# Patient Record
Sex: FEMALE | Race: WHITE | NOT HISPANIC OR LATINO | Employment: PART TIME | URBAN - METROPOLITAN AREA
[De-identification: names, ages, dates, MRNs, and addresses within clinical notes are randomized per-mention and may not be internally consistent; named-entity substitution may affect disease eponyms.]

---

## 2017-05-20 ENCOUNTER — OFFICE VISIT (OUTPATIENT)
Dept: URGENT CARE | Facility: URGENT CARE | Age: 27
End: 2017-05-20
Payer: COMMERCIAL

## 2017-05-20 ENCOUNTER — RADIANT APPOINTMENT (OUTPATIENT)
Dept: GENERAL RADIOLOGY | Facility: CLINIC | Age: 27
End: 2017-05-20
Attending: PHYSICIAN ASSISTANT
Payer: COMMERCIAL

## 2017-05-20 VITALS
OXYGEN SATURATION: 100 % | DIASTOLIC BLOOD PRESSURE: 64 MMHG | SYSTOLIC BLOOD PRESSURE: 106 MMHG | RESPIRATION RATE: 12 BRPM | WEIGHT: 158 LBS | TEMPERATURE: 98.9 F | HEART RATE: 72 BPM

## 2017-05-20 DIAGNOSIS — M54.6 ACUTE LEFT-SIDED THORACIC BACK PAIN: Primary | ICD-10-CM

## 2017-05-20 DIAGNOSIS — M54.6 ACUTE LEFT-SIDED THORACIC BACK PAIN: ICD-10-CM

## 2017-05-20 LAB
ALBUMIN SERPL-MCNC: 3.3 G/DL (ref 3.4–5)
ALBUMIN UR-MCNC: NEGATIVE MG/DL
ALP SERPL-CCNC: 41 U/L (ref 40–150)
ALT SERPL W P-5'-P-CCNC: 21 U/L (ref 0–50)
ANION GAP SERPL CALCULATED.3IONS-SCNC: 7 MMOL/L (ref 3–14)
APPEARANCE UR: CLEAR
AST SERPL W P-5'-P-CCNC: 29 U/L (ref 0–45)
BILIRUB SERPL-MCNC: 0.9 MG/DL (ref 0.2–1.3)
BILIRUB UR QL STRIP: NEGATIVE
BUN SERPL-MCNC: 14 MG/DL (ref 7–30)
CALCIUM SERPL-MCNC: 9.3 MG/DL (ref 8.5–10.1)
CHLORIDE SERPL-SCNC: 104 MMOL/L (ref 94–109)
CO2 SERPL-SCNC: 27 MMOL/L (ref 20–32)
COLOR UR AUTO: YELLOW
CREAT SERPL-MCNC: 1.2 MG/DL (ref 0.52–1.04)
ERYTHROCYTE [DISTWIDTH] IN BLOOD BY AUTOMATED COUNT: 12.5 % (ref 10–15)
GFR SERPL CREATININE-BSD FRML MDRD: 55 ML/MIN/1.7M2
GLUCOSE SERPL-MCNC: 104 MG/DL (ref 70–99)
GLUCOSE UR STRIP-MCNC: NEGATIVE MG/DL
HCT VFR BLD AUTO: 41.6 % (ref 35–47)
HGB BLD-MCNC: 14.1 G/DL (ref 11.7–15.7)
HGB UR QL STRIP: NEGATIVE
KETONES UR STRIP-MCNC: NEGATIVE MG/DL
LEUKOCYTE ESTERASE UR QL STRIP: NEGATIVE
MCH RBC QN AUTO: 30.5 PG (ref 26.5–33)
MCHC RBC AUTO-ENTMCNC: 33.9 G/DL (ref 31.5–36.5)
MCV RBC AUTO: 90 FL (ref 78–100)
NITRATE UR QL: NEGATIVE
PH UR STRIP: 7 PH (ref 5–7)
PLATELET # BLD AUTO: 336 10E9/L (ref 150–450)
POTASSIUM SERPL-SCNC: 4.1 MMOL/L (ref 3.4–5.3)
PROT SERPL-MCNC: 7 G/DL (ref 6.8–8.8)
RBC # BLD AUTO: 4.62 10E12/L (ref 3.8–5.2)
RBC #/AREA URNS AUTO: NORMAL /HPF (ref 0–2)
SODIUM SERPL-SCNC: 138 MMOL/L (ref 133–144)
SP GR UR STRIP: 1.01 (ref 1–1.03)
URN SPEC COLLECT METH UR: NORMAL
UROBILINOGEN UR STRIP-ACNC: 0.2 EU/DL (ref 0.2–1)
WBC # BLD AUTO: 9.9 10E9/L (ref 4–11)
WBC #/AREA URNS AUTO: NORMAL /HPF (ref 0–2)

## 2017-05-20 PROCEDURE — 93000 ELECTROCARDIOGRAM COMPLETE: CPT | Performed by: PHYSICIAN ASSISTANT

## 2017-05-20 PROCEDURE — 81001 URINALYSIS AUTO W/SCOPE: CPT | Performed by: PHYSICIAN ASSISTANT

## 2017-05-20 PROCEDURE — 71020 XR CHEST 2 VW: CPT

## 2017-05-20 PROCEDURE — 85027 COMPLETE CBC AUTOMATED: CPT | Performed by: PHYSICIAN ASSISTANT

## 2017-05-20 PROCEDURE — 36415 COLL VENOUS BLD VENIPUNCTURE: CPT | Performed by: PHYSICIAN ASSISTANT

## 2017-05-20 PROCEDURE — 99203 OFFICE O/P NEW LOW 30 MIN: CPT | Performed by: PHYSICIAN ASSISTANT

## 2017-05-20 PROCEDURE — 80053 COMPREHEN METABOLIC PANEL: CPT | Performed by: PHYSICIAN ASSISTANT

## 2017-05-20 RX ORDER — DESOGESTREL AND ETHINYL ESTRADIOL 0.15-0.03
1 KIT ORAL DAILY
COMMUNITY
End: 2019-03-21

## 2017-05-20 NOTE — PROGRESS NOTES
SUBJECTIVE  HPI: Miracle Robertson is a 27 year old female who presents for evaluation of back pain and chest pain.  Back pain started yesterday afternoon, worsening now 6-7/10 (stabbing).   and .  Drove 4 hours (pain started then).  Sore to palpation.  Chest pain (5/10) tightness started last night.      History of heartburn with stress, too much coffee.  No indulgences  Prior to event.      Worse with deep breath.    No DVT history, risk factors.  OC for 6 years.      No Diabetes, Hypertension, hyperlipidemia.      Amiono acid supplement with workouts, 4x weekly for 2 years.       No past medical history on file.  Current Outpatient Prescriptions   Medication Sig Dispense Refill     budesonide (RINOCORT AQUA) 32 MCG/ACT spray Spray 1 spray into both nostrils daily       desogestrel-ethinyl estradiol (APRI) 0.15-30 MG-MCG per tablet Take 1 tablet by mouth daily       Social History   Substance Use Topics     Smoking status: Never Smoker     Smokeless tobacco: Not on file     Alcohol use Not on file       ROS:  ROS negative except as listed above      OBJECTIVE:  /64 (BP Location: Right arm, Patient Position: Chair, Cuff Size: Adult Regular)  Pulse 72  Temp 98.9  F (37.2  C) (Oral)  Resp 12  Wt 158 lb (71.7 kg)  SpO2 100%  Back examination:  Tenderness in left sided rhomboid area.  GENERAL APPEARANCE: healthy, alert and no distress  HENT: ear canals and TM's normal.  Nose and mouth without ulcers, erythema or lesions  RESP: lungs clear to auscultation - no rales, rhonchi or wheezes  CV: regular rates and rhythm, normal S1 S2, no murmur noted  ABDOMEN:  soft, nontender, no HSM or masses and bowel sounds normal  NEURO: Normal strength and tone with no weakness or sensory deficit noted, reflexes normal   BACK: No CVA tenderness    Results for orders placed or performed in visit on 05/20/17   Comprehensive metabolic panel (BMP + Alb, Alk Phos, ALT, AST, Total. Bili, TP)   Result  Value Ref Range    Sodium 138 133 - 144 mmol/L    Potassium 4.1 3.4 - 5.3 mmol/L    Chloride 104 94 - 109 mmol/L    Carbon Dioxide 27 20 - 32 mmol/L    Anion Gap 7 3 - 14 mmol/L    Glucose 104 (H) 70 - 99 mg/dL    Urea Nitrogen 14 7 - 30 mg/dL    Creatinine 1.20 (H) 0.52 - 1.04 mg/dL    GFR Estimate 55 (L) >60 mL/min/1.7m2    GFR Estimate If Black 66 >60 mL/min/1.7m2    Calcium 9.3 8.5 - 10.1 mg/dL    Bilirubin Total 0.9 0.2 - 1.3 mg/dL    Albumin 3.3 (L) 3.4 - 5.0 g/dL    Protein Total 7.0 6.8 - 8.8 g/dL    Alkaline Phosphatase 41 40 - 150 U/L    ALT 21 0 - 50 U/L    AST 29 0 - 45 U/L   CBC with platelets   Result Value Ref Range    WBC 9.9 4.0 - 11.0 10e9/L    RBC Count 4.62 3.8 - 5.2 10e12/L    Hemoglobin 14.1 11.7 - 15.7 g/dL    Hematocrit 41.6 35.0 - 47.0 %    MCV 90 78 - 100 fl    MCH 30.5 26.5 - 33.0 pg    MCHC 33.9 31.5 - 36.5 g/dL    RDW 12.5 10.0 - 15.0 %    Platelet Count 336 150 - 450 10e9/L   UA with Microscopic reflex to Culture   Result Value Ref Range    Color Urine Yellow     Appearance Urine Clear     Glucose Urine Negative NEG mg/dL    Bilirubin Urine Negative NEG    Ketones Urine Negative NEG mg/dL    Specific Gravity Urine 1.010 1.003 - 1.035    pH Urine 7.0 5.0 - 7.0 pH    Protein Albumin Urine Negative NEG mg/dL    Urobilinogen Urine 0.2 0.2 - 1.0 EU/dL    Nitrite Urine Negative NEG    Blood Urine Negative NEG    Leukocyte Esterase Urine Negative NEG    Source Midstream Urine     WBC Urine O - 2 0 - 2 /HPF    RBC Urine O - 2 0 - 2 /HPF     EKG: NSR  CXR: WNL    ASSESSMENT/IMPRESSION:  (M54.6) Acute left-sided thoracic back pain  (primary encounter diagnosis)  Comment:  Labs, exam, history do not show evidence of GI, pulm, cardiac etiology.  Likely MS injury.  Plan: EKG 12-lead complete w/read - Clinics, XR Chest      2 Views, Comprehensive metabolic panel (BMP +         Alb, Alk Phos, ALT, AST, Total. Bili, TP), CBC         with platelets, UA with Microscopic reflex to          Culture  RICE  Red flags and emergent follow up discussed, and understood by patient  Follow up with PCP if symptoms worsen or fail to improve

## 2017-05-20 NOTE — MR AVS SNAPSHOT
"              After Visit Summary   5/20/2017    Miracle Robertson    MRN: [id number]           Patient Information     Date Of Birth          1990        Visit Information        Provider Department      5/20/2017 10:40 AM Allen Vazquez PA-C [facility]        Today's Diagnoses     Acute left-sided thoracic back pain    -  1      Care Instructions      Neck/Back Pain: General    Both neck and back pain are usually caused by injury to the muscles or ligaments of the spine. Sometimes the disks that separate each bone of the spine may cause pain by pressing on a nearby nerve. Back and neck pain may appear after a sudden twisting/bending force (such as in a car accident), or sometimes after a simple awkward movement. In either case, muscle spasm is often present and adds to the pain.  Acute neck and back pain usually gets better in 1 to 2 weeks. Pain related to disk disease, arthritis in the spinal joints or spinal stenosis (narrowing of the spinal canal) can become chronic and last for months or years.  Back and neck pain are common problems. Most people feel better in 1 or 2 weeks, and most of the rest in 1 to 2 months. Most people can remain active.  Symptoms  People experience and describe pain differently.    Pain can be sharp, stabbing, shooting, aching, cramping, or burning    Movement, standing, bending, lifting, sitting, or walking may worsen the pain    Pain can be localized to one spot or area, or it can be more generalized    Pain can spread or radiate upwards, downwards, to the front, or go down your arms    Muscle spasm may occur.  Cause  Most of the time \"mechanical problems\" with the muscles or spine cause the pain. it is usually caused by an injury, whether known or not, to the muscles or ligaments. While illnesses can cause back pain, it is usually not caused by a serious illness. Pain is usually related to physical activity, whether sports, exercise, work, or normal " activity. Sometimes it can occur without an identifiable cause. This can happen simply by stretching or moving wrong, without noting pain at the time. Other causes include:    Overexertion, lifting, pushing, pulling incorrectly or too aggressively.    Sudden twisting, bending or stretching from an accident (car or fall), or accidental movement.    Poor posture    Poor conditioning, lack of regular exercise    Spinal disc disease or arthritis    Stress    Pregnancy, or illness like appendicitis, bladder or kidney infection, pelvic infections   Home care    For neck pain: Use a comfortable pillow that supports the head and keeps the spine in a neutral position. The position of the head should not be tilted forward or backward.    When in bed, try to find a position of comfort. A firm mattress is best. Try lying flat on your back with pillows under your knees. You can also try lying on your side with your knees bent up towards your chest and a pillow between your knees.    At first, do not try to stretch out the sore spots. If there is a strain, it is not like the good soreness you get after exercising without an injury. In this case, stretching may make it worse.    Avoid prolonged sitting, long car rides or travel. This puts more stress on the lower back than standing or walking.    During the first 24 to 72 hours after an injury, apply an ice pack to the painful area for 20 minutes and then remove it for 20 minutes over a period of 60 to 90 minutes or several times a day. As a safety precaution, do not use a heating pad at bedtime. Sleeping with a heating pad can lead to skin burns or tissue damage.    Ice and heat therapies can be alternated. Talk with your health care provider about the best treatment for your back or neck pain.    Therapeutic massage can help relax the back and neck muscles without stretching them.    Be aware of safe lifting methods and do not lift anything over 15 pounds until all the pain is  gone.  Medications  Talk to your health care provider before using medications, especially if you have other medical problems or are taking other medicines.    You may use acetaminophen (such as Tylenol) or ibuprofen (such as Advil or Motrin) to control pain, unless another pain medicine was prescribed. If you have chronic conditions like diabetes, liver or kidney disease, stomach ulcers,  gastrointestinal bleeding, or are taking blood thinner medications.    Be careful if you are given pain medicines, narcotics, or medication for muscle spasm. They can cause drowsiness, and can affect your coordination, reflexes, and judgment. Do not drive or operate heavy machinery.  Follow-up care  Follow up with your health care provider if your symptoms do not start to improve after one week. Physical therapy or further tests may be needed.  If X-rays were taken, they will be reviewed by a radiologist. You will be notified of any new findings that may affect your care.  Call 911  Seek emergency medical care if any of the following occur:    Trouble breathing    Confusion    Very drowsy or trouble awakening    Fainting or loss of consciousness    Rapid or very slow heart rate    Loss of bowel or bladder control  When to seek medical care  Get prompt medical attention if any of the following occur:    Pain becomes worse or spreads into your arms or legs    Weakness, numbness or pain in one or both arms or legs    Numbness in the groin area    Difficulty walking    Fever of 100.4 F (38 C) or higher, or as directed by your healthcare provider    8871-5999 [employer]. [address] [place] [address]. All rights reserved. This information is not intended as a substitute for professional medical care. Always follow your healthcare professional's instructions.        Discharge Instructions for Gastroesophageal Reflux Disease (GERD)  Gastroesophageal reflux disease (GERD) is a backflow of acid from the stomach  into the swallowing tube (esophagus).  Home care  These home care steps can help you manage GERD:    Maintain a healthy weight. Get help to lose any extra pounds.    Avoid lying down after meals.    Avoid eating late at night.    Elevate the head of your bed by 6 inches. You can do this by placing wooden blocks under the head of your bed.    Avoid wearing tight-fitting clothes.    Avoid foods that might irritate your stomach, such as the following:    Alcohol    Fat    Chocolate    Caffeine    Spearmint or peppermint    Talk to your doctor if you are taking any of the following medications. These medications can make GERD symptoms worse:    Calcium channel blockers    Theophylline    Anticholinergic medications such as oxybutynin and benzatropine    Begin an exercise program. Ask your doctor how to get started. You can benefit from simple activities, such as walking or gardening.    Break the smoking habit. Enroll in a stop-smoking program to improve your chances of success.    Limit alcohol intake to no more than 2 drinks a day.    Take your medications exactly as directed. Don t skip doses.    Avoid over-the-counter nonsteroidal anti-inflammatory drugs, such as aspirin and ibuprofen (Advil, Motrin).    If possible, avoid nitrates (heart medications such as nitroglycerin and Isordil).  Follow-up care  Make a follow-up appointment as directed by our staff.     When to seek medical care  Call your doctor immediately if you have any of the following:    Trouble swallowing    Pain when swallowing    Feeling of food caught in your chest or throat    Pain in the neck, chest, or back    Heartburn that causes you to vomit    Vomiting blood    Black or tarry stools (from digested blood)    More saliva (watering of the mouth) than usual    Weight loss of more than 3% to 5% of your total body weight in a month    Hoarseness or sore throat that won t go away    Choking, coughing, or wheezing     4649-1816 [employer] [address] [place] [address]. All rights reserved. This information is not intended as a substitute for professional medical care. Always follow your healthcare professional's instructions.              Follow-ups after your visit        Who to contact     If you have questions or need follow up information about today's clinic visit or your schedule please contact [facility] directly at [phone].  Normal or non-critical lab and imaging results will be communicated to you by MyChart, letter or phone within 4 business days after the clinic has received the results. If you do not hear from us within 7 days, please contact the clinic through [employer] or phone. If you have a critical or abnormal lab result, we will notify you by phone as soon as possible.  Submit refill requests through [employer] or call your pharmacy and they will forward the refill request to us. Please allow 3 business days for your refill to be completed.          Additional Information About Your Visit        [employer]â€™s Information     [employer] lets you send messages to your doctor, view your test results, renew your prescriptions, schedule appointments and more. To sign up, go to [email or web address] . Click on \"Log in\" on the left side of the screen, which will take you to the Welcome page. Then click on \"Sign up Now\" on the right side of the page.     You will be asked to enter the access code listed below, as well as some personal information. Please follow the directions to create your username and password.     Your access code is: [email or web address]  Expires: 2017 12:31 PM     Your access code will  in 90 days. If you need help or a new code, please call your [facility] clinic or [phone].        Care EveryWhere ID     This is your Care EveryWhere ID. This could be used by other organizations to access your [facility] medical records  [id number]        Your Vitals Were     Pulse Temperature " Respirations Pulse Oximetry          72 98.9  F (37.2  C) (Oral) 12 100%         Blood Pressure from Last 3 Encounters:   05/20/17 106/64    Weight from Last 3 Encounters:   05/20/17 158 lb (71.7 kg)              We Performed the Following     CBC with platelets     Comprehensive metabolic panel (BMP + Alb, Alk Phos, ALT, AST, Total. Bili, TP)     EKG 12-lead complete w/read - Clinics     UA with Microscopic reflex to Culture        Primary Care Provider    None Specified       No primary provider on file.        Thank you!     Thank you for choosing [facility]  for your care. Our goal is always to provide you with excellent care. Hearing back from our patients is one way we can continue to improve our services. Please take a few minutes to complete the written survey that you may receive in the mail after your visit with us. Thank you!             Your Updated Medication List - Protect others around you: Learn how to safely use, store and throw away your medicines at www.disposemymeds.org.          This list is accurate as of: 5/20/17 12:35 PM.  Always use your most recent med list.                   Brand Name Dispense Instructions for use    budesonide 32 MCG/ACT spray    RINOCORT AQUA     Spray 1 spray into both nostrils daily       desogestrel-ethinyl estradiol 0.15-30 MG-MCG per tablet    APRI     Take 1 tablet by mouth daily

## 2017-05-20 NOTE — NURSING NOTE
Chief Complaint   Patient presents with     Urgent Care     Pain     Pt has had some back pain since yesterday and it is mid/left near scapula.  Noticed some difficulty/pain with deep breaths last night and today as well.  She states that she lifts weights and is a  but wants to make sure it isnt something other than muscular becaue she is flying somewhere tomorrow.       Initial /64 (BP Location: Right arm, Patient Position: Chair, Cuff Size: Adult Regular)  Pulse 72  Temp 98.9  F (37.2  C) (Oral)  Resp 12  Wt 158 lb (71.7 kg)  SpO2 100% There is no height or weight on file to calculate BMI..  BP completed using cuff size: regular  Laisha Bernardo R.N.    Pt rates her pain as 5-6/10 when she takes a deep breath.

## 2017-05-20 NOTE — PATIENT INSTRUCTIONS
"  Neck/Back Pain: General    Both neck and back pain are usually caused by injury to the muscles or ligaments of the spine. Sometimes the disks that separate each bone of the spine may cause pain by pressing on a nearby nerve. Back and neck pain may appear after a sudden twisting/bending force (such as in a car accident), or sometimes after a simple awkward movement. In either case, muscle spasm is often present and adds to the pain.  Acute neck and back pain usually gets better in 1 to 2 weeks. Pain related to disk disease, arthritis in the spinal joints or spinal stenosis (narrowing of the spinal canal) can become chronic and last for months or years.  Back and neck pain are common problems. Most people feel better in 1 or 2 weeks, and most of the rest in 1 to 2 months. Most people can remain active.  Symptoms  People experience and describe pain differently.    Pain can be sharp, stabbing, shooting, aching, cramping, or burning    Movement, standing, bending, lifting, sitting, or walking may worsen the pain    Pain can be localized to one spot or area, or it can be more generalized    Pain can spread or radiate upwards, downwards, to the front, or go down your arms    Muscle spasm may occur.  Cause  Most of the time \"mechanical problems\" with the muscles or spine cause the pain. it is usually caused by an injury, whether known or not, to the muscles or ligaments. While illnesses can cause back pain, it is usually not caused by a serious illness. Pain is usually related to physical activity, whether sports, exercise, work, or normal activity. Sometimes it can occur without an identifiable cause. This can happen simply by stretching or moving wrong, without noting pain at the time. Other causes include:    Overexertion, lifting, pushing, pulling incorrectly or too aggressively.    Sudden twisting, bending or stretching from an accident (car or fall), or accidental movement.    Poor posture    Poor conditioning, " lack of regular exercise    Spinal disc disease or arthritis    Stress    Pregnancy, or illness like appendicitis, bladder or kidney infection, pelvic infections   Home care    For neck pain: Use a comfortable pillow that supports the head and keeps the spine in a neutral position. The position of the head should not be tilted forward or backward.    When in bed, try to find a position of comfort. A firm mattress is best. Try lying flat on your back with pillows under your knees. You can also try lying on your side with your knees bent up towards your chest and a pillow between your knees.    At first, do not try to stretch out the sore spots. If there is a strain, it is not like the good soreness you get after exercising without an injury. In this case, stretching may make it worse.    Avoid prolonged sitting, long car rides or travel. This puts more stress on the lower back than standing or walking.    During the first 24 to 72 hours after an injury, apply an ice pack to the painful area for 20 minutes and then remove it for 20 minutes over a period of 60 to 90 minutes or several times a day. As a safety precaution, do not use a heating pad at bedtime. Sleeping with a heating pad can lead to skin burns or tissue damage.    Ice and heat therapies can be alternated. Talk with your health care provider about the best treatment for your back or neck pain.    Therapeutic massage can help relax the back and neck muscles without stretching them.    Be aware of safe lifting methods and do not lift anything over 15 pounds until all the pain is gone.  Medications  Talk to your health care provider before using medications, especially if you have other medical problems or are taking other medicines.    You may use acetaminophen (such as Tylenol) or ibuprofen (such as Advil or Motrin) to control pain, unless another pain medicine was prescribed. If you have chronic conditions like diabetes, liver or kidney disease, stomach  ulcers,  gastrointestinal bleeding, or are taking blood thinner medications.    Be careful if you are given pain medicines, narcotics, or medication for muscle spasm. They can cause drowsiness, and can affect your coordination, reflexes, and judgment. Do not drive or operate heavy machinery.  Follow-up care  Follow up with your health care provider if your symptoms do not start to improve after one week. Physical therapy or further tests may be needed.  If X-rays were taken, they will be reviewed by a radiologist. You will be notified of any new findings that may affect your care.  Call 911  Seek emergency medical care if any of the following occur:    Trouble breathing    Confusion    Very drowsy or trouble awakening    Fainting or loss of consciousness    Rapid or very slow heart rate    Loss of bowel or bladder control  When to seek medical care  Get prompt medical attention if any of the following occur:    Pain becomes worse or spreads into your arms or legs    Weakness, numbness or pain in one or both arms or legs    Numbness in the groin area    Difficulty walking    Fever of 100.4 F (38 C) or higher, or as directed by your healthcare provider    6125-9866 [employer]. [address] [place], [place] [id number]. All rights reserved. This information is not intended as a substitute for professional medical care. Always follow your healthcare professional's instructions.        Discharge Instructions for Gastroesophageal Reflux Disease (GERD)  Gastroesophageal reflux disease (GERD) is a backflow of acid from the stomach into the swallowing tube (esophagus).  Home care  These home care steps can help you manage GERD:    Maintain a healthy weight. Get help to lose any extra pounds.    Avoid lying down after meals.    Avoid eating late at night.    Elevate the head of your bed by 6 inches. You can do this by placing wooden blocks under the head of your bed.    Avoid wearing tight-fitting  clothes.    Avoid foods that might irritate your stomach, such as the following:    Alcohol    Fat    Chocolate    Caffeine    Spearmint or peppermint    Talk to your doctor if you are taking any of the following medications. These medications can make GERD symptoms worse:    Calcium channel blockers    Theophylline    Anticholinergic medications such as oxybutynin and benzatropine    Begin an exercise program. Ask your doctor how to get started. You can benefit from simple activities, such as walking or gardening.    Break the smoking habit. Enroll in a stop-smoking program to improve your chances of success.    Limit alcohol intake to no more than 2 drinks a day.    Take your medications exactly as directed. Don t skip doses.    Avoid over-the-counter nonsteroidal anti-inflammatory drugs, such as aspirin and ibuprofen (Advil, Motrin).    If possible, avoid nitrates (heart medications such as nitroglycerin and Isordil).  Follow-up care  Make a follow-up appointment as directed by our staff.     When to seek medical care  Call your doctor immediately if you have any of the following:    Trouble swallowing    Pain when swallowing    Feeling of food caught in your chest or throat    Pain in the neck, chest, or back    Heartburn that causes you to vomit    Vomiting blood    Black or tarry stools (from digested blood)    More saliva (watering of the mouth) than usual    Weight loss of more than 3% to 5% of your total body weight in a month    Hoarseness or sore throat that won t go away    Choking, coughing, or wheezing     [id number] [employer]. [address] [place] [address], [place] [address]. All rights reserved. This information is not intended as a substitute for professional medical care. Always follow your healthcare professional's instructions.

## 2018-02-20 ENCOUNTER — TRANSFERRED RECORDS (OUTPATIENT)
Dept: HEALTH INFORMATION MANAGEMENT | Facility: CLINIC | Age: 28
End: 2018-02-20

## 2018-02-20 LAB
C TRACH DNA SPEC QL PROBE+SIG AMP: NEGATIVE
HPV ABSTRACT: NORMAL
N GONORRHOEA DNA SPEC QL PROBE+SIG AMP: NEGATIVE
PAP-ABSTRACT: ABNORMAL
SPECIMEN DESCRIP: NORMAL
SPECIMEN DESCRIPTION: NORMAL

## 2019-03-21 ENCOUNTER — OFFICE VISIT (OUTPATIENT)
Dept: INTERNAL MEDICINE | Facility: CLINIC | Age: 29
End: 2019-03-21
Payer: COMMERCIAL

## 2019-03-21 VITALS
RESPIRATION RATE: 14 BRPM | DIASTOLIC BLOOD PRESSURE: 70 MMHG | OXYGEN SATURATION: 97 % | SYSTOLIC BLOOD PRESSURE: 98 MMHG | HEART RATE: 83 BPM | WEIGHT: 145.5 LBS | HEIGHT: 65 IN | BODY MASS INDEX: 24.24 KG/M2

## 2019-03-21 DIAGNOSIS — Z12.4 SCREENING FOR MALIGNANT NEOPLASM OF CERVIX: ICD-10-CM

## 2019-03-21 DIAGNOSIS — Z23 NEED FOR PROPHYLACTIC VACCINATION WITH TETANUS-DIPHTHERIA (TD): ICD-10-CM

## 2019-03-21 DIAGNOSIS — L24.9 IRRITANT CONTACT DERMATITIS, UNSPECIFIED TRIGGER: ICD-10-CM

## 2019-03-21 DIAGNOSIS — Z23 NEED FOR VACCINATION: ICD-10-CM

## 2019-03-21 DIAGNOSIS — Z30.41 ENCOUNTER FOR SURVEILLANCE OF CONTRACEPTIVE PILLS: ICD-10-CM

## 2019-03-21 DIAGNOSIS — Z00.00 ROUTINE GENERAL MEDICAL EXAMINATION AT A HEALTH CARE FACILITY: Primary | ICD-10-CM

## 2019-03-21 PROCEDURE — 90715 TDAP VACCINE 7 YRS/> IM: CPT | Performed by: INTERNAL MEDICINE

## 2019-03-21 PROCEDURE — 99213 OFFICE O/P EST LOW 20 MIN: CPT | Mod: 25 | Performed by: INTERNAL MEDICINE

## 2019-03-21 PROCEDURE — 90471 IMMUNIZATION ADMIN: CPT | Performed by: INTERNAL MEDICINE

## 2019-03-21 PROCEDURE — 99395 PREV VISIT EST AGE 18-39: CPT | Mod: 25 | Performed by: INTERNAL MEDICINE

## 2019-03-21 RX ORDER — TRIAMCINOLONE ACETONIDE 1 MG/G
OINTMENT TOPICAL 2 TIMES DAILY
Qty: 30 G | Refills: 0 | Status: SHIPPED | OUTPATIENT
Start: 2019-03-21 | End: 2020-01-20

## 2019-03-21 RX ORDER — DESOGESTREL AND ETHINYL ESTRADIOL 0.15-0.03
1 KIT ORAL DAILY
Qty: 84 TABLET | Refills: 3 | Status: SHIPPED | OUTPATIENT
Start: 2019-03-21 | End: 2020-02-10

## 2019-03-21 SDOH — HEALTH STABILITY: MENTAL HEALTH: HOW MANY STANDARD DRINKS CONTAINING ALCOHOL DO YOU HAVE ON A TYPICAL DAY?: 1 OR 2

## 2019-03-21 SDOH — HEALTH STABILITY: MENTAL HEALTH: HOW OFTEN DO YOU HAVE A DRINK CONTAINING ALCOHOL?: 2-4 TIMES A MONTH

## 2019-03-21 SDOH — HEALTH STABILITY: MENTAL HEALTH: HOW OFTEN DO YOU HAVE 6 OR MORE DRINKS ON ONE OCCASION?: NEVER

## 2019-03-21 ASSESSMENT — MIFFLIN-ST. JEOR: SCORE: 1400.86

## 2019-03-21 NOTE — Clinical Note
Please abstract the following data from this visit with this patient into the appropriate field in Epic:Pap smear done on this date: 2/20/18 (approximately), by this group:  [facility] , results were ascus w/negative HPV.

## 2019-03-21 NOTE — Clinical Note
Please abstract the following data from this visit with this patient into the appropriate field in Epic:Pap smear done on this date: 2/20/18 (approximately), by this group: [facility], results were normal.

## 2019-03-21 NOTE — PROGRESS NOTES
SUBJECTIVE:   CC: Miracle Robertson is an 28 year old woman who presents for preventive health visit.     Physical   Annual:     Getting at least 3 servings of Calcium per day:  Yes    Bi-annual eye exam:  Yes    Dental care twice a year:  Yes    Sleep apnea or symptoms of sleep apnea:  None    Diet:  Regular (no restrictions)    Frequency of exercise:  4-5 days/week    Duration of exercise:  45-60 minutes    Taking medications regularly:  Yes    Medication side effects:  None    Additional concerns today:  Yes    PHQ-2 Total Score: 1          Rash      Duration:     Description  Location: both shins  Itching: moderate    Intensity:  mild    Accompanying signs and symptoms: None    History (similar episodes/previous evaluation): None    Precipitating or alleviating factors:  New exposures:  Dryer sheets  Recent travel: [place] jan/feb    Therapies tried and outcome: Benadryl/diphenhydramine -  effective Allegra/fexofenadine -  not effective      Today's PHQ-2 Score:   PHQ-2 ( 1999 [employer]) 3/21/2019   Q1: Little interest or pleasure in doing things 0   Q2: Feeling down, depressed or hopeless 1   PHQ-2 Score 1   Q1: Little interest or pleasure in doing things Not at all   Q2: Feeling down, depressed or hopeless Several days   PHQ-2 Score 1       Abuse: Current or Past(Physical, Sexual or Emotional)- No  Do you feel safe in your environment? Yes    Social History     Tobacco Use     Smoking status: Never Smoker     Smokeless tobacco: Never Used   Substance Use Topics     Alcohol use: Yes     Alcohol/week: 0.6 - 1.2 oz     Types: 1 - 2 Standard drinks or equivalent per week     Frequency: 2-4 times a month     Drinks per session: 1 or 2     Binge frequency: Never     Alcohol Use 3/21/2019   If you drink alcohol do you typically have greater than 3 drinks per day OR greater than 7 drinks per week? No   No flowsheet data found.    Reviewed orders with patient.  Reviewed health maintenance and updated orders  "accordingly - No       Mammogram not appropriate for this patient based on age.    Pertinent mammograms are reviewed under the imaging tab.    History of abnormal Pap smear: :     Last pap 2018- ASCUS.  HPV negative    Reviewed and updated as needed this visit by clinical staff  Tobacco  Allergies  Meds  Soc Hx        Reviewed and updated as needed this visit by Provider            Review of Systems  CONSTITUTIONAL: NEGATIVE for fever, chills, change in weight  INTEGUMENTARY/SKIN: as above  EYES: NEGATIVE for vision changes or irritation  ENT: NEGATIVE for ear, mouth and throat problems  RESP: NEGATIVE for significant cough or SOB  BREAST: NEGATIVE for masses, tenderness or discharge  CV: NEGATIVE for chest pain, palpitations or peripheral edema  GI: NEGATIVE for nausea, abdominal pain, heartburn, or change in bowel habits  : NEGATIVE for unusual urinary or vaginal symptoms. Periods are regular.  MUSCULOSKELETAL: NEGATIVE for significant arthralgias or myalgia  NEURO: NEGATIVE for weakness, dizziness or paresthesias  ENDOCRINE: NEGATIVE for temperature intolerance, skin/hair changes  HEME/ALLERGY/IMMUNE: NEGATIVE for bleeding problems  PSYCHIATRIC: NEGATIVE for changes in mood or affect     OBJECTIVE:   BP 98/70   Pulse 83   Resp 14   Ht 1.651 m (5' 5\")   Wt 66 kg (145 lb 8 oz)   LMP 03/19/2019 (Exact Date)   SpO2 97%   BMI 24.21 kg/m    Physical Exam  GENERAL: healthy, alert and no distress  EYES: Eyes grossly normal to inspection, PERRL and conjunctivae and sclerae normal  HENT: ear canals and TM's normal, nose and mouth without ulcers or lesions  NECK: no adenopathy, no asymmetry, masses, or scars and thyroid normal to palpation  RESP: lungs clear to auscultation - no rales, rhonchi or wheezes  CV: regular rate and rhythm, normal S1 S2, no S3 or S4, no murmur, click or rub, no peripheral edema and peripheral pulses strong  ABDOMEN: soft, nontender, no hepatosplenomegaly, no masses and bowel sounds " "normal  MS: no gross musculoskeletal defects noted, no edema  NEURO: Normal strength and tone, mentation intact and speech normal  PSYCH: mentation appears normal, affect normal/bright  LYMPH: no cervical, supraclavicular, axillary, or inguinal adenopathy  Skin: Erythematous rash on both shins with actually some central vesicular lesions.  none     ASSESSMENT/PLAN:   1. Routine general medical examination at a health care facility  Overall in excellent health    2. Screening for malignant neoplasm of cervix  Up-to-date.  Pap with reflex HPV done last year.  ASCUS result with a negative HPV.  Next Pap due 2021    3. Need for prophylactic vaccination with tetanus-diphtheria (Td)  Need for vaccination  - ADMIN 1st VACCINE      4. Irritant contact dermatitis, unspecified trigger  There is very consistent with a contact dermatitis  - triamcinolone (KENALOG) 0.1 % external ointment; Apply topically 2 times daily Until resolved or 14 d whichever is sooner  Dispense: 30 g; Refill: 0    5. Encounter for surveillance of contraceptive pills  Pap is up-to-date.  Available on care everywhere, with negative reflex HPV next is due every 3 years  Deferred STD screening. Done in 2018  - desogestrel-ethinyl estradiol (APRI) 0.15-30 MG-MCG tablet; Take 1 tablet by mouth daily  Dispense: 84 tablet; Refill: 3    6.   COUNSELING:  Reviewed preventive health counseling, as reflected in patient instructions    BP Readings from Last 1 Encounters:   03/21/19 98/70     Estimated body mass index is 24.21 kg/m  as calculated from the following:    Height as of this encounter: 1.651 m (5' 5\").    Weight as of this encounter: 66 kg (145 lb 8 oz).           reports that  has never smoked. she has never used smokeless tobacco.      Counseling Resources:  ATP IV Guidelines  Pooled Cohorts Equation Calculator  Breast Cancer Risk Calculator  FRAX Risk Assessment  ICSI Preventive Guidelines  Dietary Guidelines for Americans, 2010  USDA's MyPlate  ASA " Prophylaxis  Lung CA Screening    Paul Moore MD  [facility]

## 2019-12-30 ENCOUNTER — ANCILLARY PROCEDURE (OUTPATIENT)
Dept: GENERAL RADIOLOGY | Facility: CLINIC | Age: 29
End: 2019-12-30
Attending: PHYSICIAN ASSISTANT
Payer: COMMERCIAL

## 2019-12-30 ENCOUNTER — OFFICE VISIT (OUTPATIENT)
Dept: URGENT CARE | Facility: URGENT CARE | Age: 29
End: 2019-12-30
Payer: COMMERCIAL

## 2019-12-30 VITALS
TEMPERATURE: 98.2 F | HEART RATE: 86 BPM | OXYGEN SATURATION: 100 % | BODY MASS INDEX: 23.46 KG/M2 | WEIGHT: 141 LBS | SYSTOLIC BLOOD PRESSURE: 98 MMHG | DIASTOLIC BLOOD PRESSURE: 70 MMHG

## 2019-12-30 DIAGNOSIS — R06.02 SOB (SHORTNESS OF BREATH): ICD-10-CM

## 2019-12-30 DIAGNOSIS — F41.0 PANIC ATTACK: ICD-10-CM

## 2019-12-30 DIAGNOSIS — R07.0 THROAT PAIN: Primary | ICD-10-CM

## 2019-12-30 DIAGNOSIS — N39.0 URINARY TRACT INFECTION WITHOUT HEMATURIA, SITE UNSPECIFIED: ICD-10-CM

## 2019-12-30 DIAGNOSIS — R42 DIZZINESS: ICD-10-CM

## 2019-12-30 DIAGNOSIS — F41.1 GENERALIZED ANXIETY DISORDER: ICD-10-CM

## 2019-12-30 LAB
ALBUMIN SERPL-MCNC: 3.9 G/DL (ref 3.4–5)
ALBUMIN UR-MCNC: NEGATIVE MG/DL
ALP SERPL-CCNC: 64 U/L (ref 40–150)
ALT SERPL W P-5'-P-CCNC: 21 U/L (ref 0–50)
ANION GAP SERPL CALCULATED.3IONS-SCNC: 2 MMOL/L (ref 3–14)
APPEARANCE UR: CLEAR
AST SERPL W P-5'-P-CCNC: 18 U/L (ref 0–45)
BASOPHILS # BLD AUTO: 0 10E9/L (ref 0–0.2)
BASOPHILS NFR BLD AUTO: 0.4 %
BILIRUB SERPL-MCNC: 0.6 MG/DL (ref 0.2–1.3)
BILIRUB UR QL STRIP: NEGATIVE
BUN SERPL-MCNC: 14 MG/DL (ref 7–30)
CALCIUM SERPL-MCNC: 9.2 MG/DL (ref 8.5–10.1)
CHLORIDE SERPL-SCNC: 105 MMOL/L (ref 94–109)
CO2 SERPL-SCNC: 31 MMOL/L (ref 20–32)
COLOR UR AUTO: YELLOW
CREAT SERPL-MCNC: 1.03 MG/DL (ref 0.52–1.04)
D DIMER PPP FEU-MCNC: <0.3 UG/ML FEU (ref 0–0.5)
DEPRECATED S PYO AG THROAT QL EIA: NORMAL
DIFFERENTIAL METHOD BLD: ABNORMAL
EOSINOPHIL # BLD AUTO: 0.1 10E9/L (ref 0–0.7)
EOSINOPHIL NFR BLD AUTO: 0.7 %
ERYTHROCYTE [DISTWIDTH] IN BLOOD BY AUTOMATED COUNT: 12.6 % (ref 10–15)
ERYTHROCYTE [SEDIMENTATION RATE] IN BLOOD BY WESTERGREN METHOD: 4 MM/H (ref 0–20)
GFR SERPL CREATININE-BSD FRML MDRD: 74 ML/MIN/{1.73_M2}
GLUCOSE SERPL-MCNC: 93 MG/DL (ref 70–99)
GLUCOSE UR STRIP-MCNC: NEGATIVE MG/DL
HCT VFR BLD AUTO: 45 % (ref 35–47)
HGB BLD-MCNC: 15.4 G/DL (ref 11.7–15.7)
HGB UR QL STRIP: ABNORMAL
KETONES UR STRIP-MCNC: NEGATIVE MG/DL
LEUKOCYTE ESTERASE UR QL STRIP: ABNORMAL
LYMPHOCYTES # BLD AUTO: 2.5 10E9/L (ref 0.8–5.3)
LYMPHOCYTES NFR BLD AUTO: 30.1 %
MCH RBC QN AUTO: 29.3 PG (ref 26.5–33)
MCHC RBC AUTO-ENTMCNC: 34.2 G/DL (ref 31.5–36.5)
MCV RBC AUTO: 86 FL (ref 78–100)
MONOCYTES # BLD AUTO: 0.6 10E9/L (ref 0–1.3)
MONOCYTES NFR BLD AUTO: 6.7 %
NEUTROPHILS # BLD AUTO: 5.1 10E9/L (ref 1.6–8.3)
NEUTROPHILS NFR BLD AUTO: 62.1 %
NITRATE UR QL: NEGATIVE
NON-SQ EPI CELLS #/AREA URNS LPF: ABNORMAL /LPF
PH UR STRIP: 7 PH (ref 5–7)
PLATELET # BLD AUTO: 404 10E9/L (ref 150–450)
POTASSIUM SERPL-SCNC: 4.5 MMOL/L (ref 3.4–5.3)
PROT SERPL-MCNC: 7.8 G/DL (ref 6.8–8.8)
RBC # BLD AUTO: 5.26 10E12/L (ref 3.8–5.2)
RBC #/AREA URNS AUTO: ABNORMAL /HPF
SODIUM SERPL-SCNC: 138 MMOL/L (ref 133–144)
SOURCE: ABNORMAL
SP GR UR STRIP: <=1.005 (ref 1–1.03)
SPECIMEN SOURCE: NORMAL
TSH SERPL DL<=0.005 MIU/L-ACNC: 0.7 MU/L (ref 0.4–4)
UROBILINOGEN UR STRIP-ACNC: 0.2 EU/DL (ref 0.2–1)
WBC # BLD AUTO: 8.2 10E9/L (ref 4–11)
WBC #/AREA URNS AUTO: ABNORMAL /HPF

## 2019-12-30 PROCEDURE — 80050 GENERAL HEALTH PANEL: CPT | Performed by: PHYSICIAN ASSISTANT

## 2019-12-30 PROCEDURE — 81001 URINALYSIS AUTO W/SCOPE: CPT | Performed by: PHYSICIAN ASSISTANT

## 2019-12-30 PROCEDURE — 85652 RBC SED RATE AUTOMATED: CPT | Performed by: PHYSICIAN ASSISTANT

## 2019-12-30 PROCEDURE — 87086 URINE CULTURE/COLONY COUNT: CPT | Performed by: PHYSICIAN ASSISTANT

## 2019-12-30 PROCEDURE — 36415 COLL VENOUS BLD VENIPUNCTURE: CPT | Performed by: PHYSICIAN ASSISTANT

## 2019-12-30 PROCEDURE — 87088 URINE BACTERIA CULTURE: CPT | Performed by: PHYSICIAN ASSISTANT

## 2019-12-30 PROCEDURE — 99215 OFFICE O/P EST HI 40 MIN: CPT | Performed by: PHYSICIAN ASSISTANT

## 2019-12-30 PROCEDURE — 71046 X-RAY EXAM CHEST 2 VIEWS: CPT

## 2019-12-30 PROCEDURE — 85379 FIBRIN DEGRADATION QUANT: CPT | Performed by: PHYSICIAN ASSISTANT

## 2019-12-30 PROCEDURE — 87081 CULTURE SCREEN ONLY: CPT | Mod: 59 | Performed by: PHYSICIAN ASSISTANT

## 2019-12-30 PROCEDURE — 87880 STREP A ASSAY W/OPTIC: CPT | Performed by: PHYSICIAN ASSISTANT

## 2019-12-30 RX ORDER — HYDROXYZINE HYDROCHLORIDE 25 MG/1
25-50 TABLET, FILM COATED ORAL 3 TIMES DAILY PRN
Qty: 30 TABLET | Refills: 0 | Status: SHIPPED | OUTPATIENT
Start: 2019-12-30 | End: 2020-01-20

## 2019-12-30 RX ORDER — NITROFURANTOIN 25; 75 MG/1; MG/1
100 CAPSULE ORAL 2 TIMES DAILY
Qty: 14 CAPSULE | Refills: 0 | Status: SHIPPED | OUTPATIENT
Start: 2019-12-30 | End: 2020-01-20

## 2019-12-30 RX ORDER — SERTRALINE HYDROCHLORIDE 25 MG/1
TABLET, FILM COATED ORAL
Qty: 30 TABLET | Refills: 0 | Status: SHIPPED | OUTPATIENT
Start: 2019-12-30 | End: 2020-01-20

## 2019-12-30 NOTE — PROGRESS NOTES
SUBJECTIVE:   Miracle Robertson is a 29 year old female presenting with a chief complaint of having panic attacks, anxiety and at times feels like she has pain or SOB.  Onset of symptoms was 1 month(s) ago.  Course of illness is waxing and waning.    Severity mild to moderate  Current and Associated symptoms: SOB, anxiety  Treatment measures tried include none.  Predisposing factors include has hx of anxiety and has been working out very hard.    Past Medical History:   Diagnosis Date     Allergic rhinitis      Recurrent otitis media     as child      ALLERGIES   No Known Allergies    Family History   Problem Relation Age of Onset     Breast Cancer Maternal Grandmother        Social History     Tobacco Use     Smoking status: Never Smoker     Smokeless tobacco: Never Used   Substance Use Topics     Alcohol use: Yes     Alcohol/week: 1.0 - 2.0 standard drinks     Types: 1 - 2 Standard drinks or equivalent per week     Frequency: 2-4 times a month     Drinks per session: 1 or 2     Binge frequency: Never       ROS:  CONSTITUTIONAL:POSITIVE  for feeling anxiety  INTEGUMENTARY/SKIN: NEGATIVE for worrisome rashes, moles or lesions  EYES: NEGATIVE for vision changes or irritation  ENT/MOUTH: NEGATIVE for ear, mouth and throat problems  RESP:POSITIVE for SOB at times  CV: NEGATIVE for chest pain, palpitations or peripheral edema  GI: NEGATIVE for nausea, abdominal pain, heartburn, or change in bowel habits  : negative for and dysuria  MUSCULOSKELETAL: NEGATIVE for significant arthralgias or myalgia  NEURO: POSITIVE for intermittent dizziness  PSYCH: POSITIVE for generalized anxiety and feeling anxious    OBJECTIVE  :BP 98/70   Pulse 86   Temp 98.2  F (36.8  C) (Tympanic)   Wt 64 kg (141 lb)   SpO2 100%   BMI 23.46 kg/m    GENERAL APPEARANCE: healthy, alert and no distress  EYES: EOMI,  PERRL, conjunctiva clear  HENT: ear canals and TM's normal.  Nose and mouth without ulcers, erythema or lesions  NECK: supple,  nontender, no lymphadenopathy  RESP: lungs clear to auscultation - no rales, rhonchi or wheezes  CV: regular rates and rhythm, normal S1 S2, no murmur noted  ABDOMEN:  soft, nontender, no HSM or masses and bowel sounds normal  Extremities: no peripheral edema or tenderness, peripheral pulses normal  MS: extremities normal- no gross deformities noted, no erythema, FROM noted in all extremities  NEURO: Normal strength and tone, sensory exam grossly normal,  normal speech and mentation  SKIN: no suspicious lesions or rashes    Results for orders placed or performed in visit on 12/30/19   CBC with platelets differential     Status: Abnormal   Result Value Ref Range    WBC 8.2 4.0 - 11.0 10e9/L    RBC Count 5.26 (H) 3.8 - 5.2 10e12/L    Hemoglobin 15.4 11.7 - 15.7 g/dL    Hematocrit 45.0 35.0 - 47.0 %    MCV 86 78 - 100 fl    MCH 29.3 26.5 - 33.0 pg    MCHC 34.2 31.5 - 36.5 g/dL    RDW 12.6 10.0 - 15.0 %    Platelet Count 404 150 - 450 10e9/L    % Neutrophils 62.1 %    % Lymphocytes 30.1 %    % Monocytes 6.7 %    % Eosinophils 0.7 %    % Basophils 0.4 %    Absolute Neutrophil 5.1 1.6 - 8.3 10e9/L    Absolute Lymphocytes 2.5 0.8 - 5.3 10e9/L    Absolute Monocytes 0.6 0.0 - 1.3 10e9/L    Absolute Eosinophils 0.1 0.0 - 0.7 10e9/L    Absolute Basophils 0.0 0.0 - 0.2 10e9/L    Diff Method Automated Method    Comprehensive metabolic panel     Status: Abnormal   Result Value Ref Range    Sodium 138 133 - 144 mmol/L    Potassium 4.5 3.4 - 5.3 mmol/L    Chloride 105 94 - 109 mmol/L    Carbon Dioxide 31 20 - 32 mmol/L    Anion Gap 2 (L) 3 - 14 mmol/L    Glucose 93 70 - 99 mg/dL    Urea Nitrogen 14 7 - 30 mg/dL    Creatinine 1.03 0.52 - 1.04 mg/dL    GFR Estimate 74 >60 mL/min/[1.73_m2]    GFR Estimate If Black 86 >60 mL/min/[1.73_m2]    Calcium 9.2 8.5 - 10.1 mg/dL    Bilirubin Total 0.6 0.2 - 1.3 mg/dL    Albumin 3.9 3.4 - 5.0 g/dL    Protein Total 7.8 6.8 - 8.8 g/dL    Alkaline Phosphatase 64 40 - 150 U/L    ALT 21 0 - 50 U/L     AST 18 0 - 45 U/L   TSH with free T4 reflex     Status: None   Result Value Ref Range    TSH 0.70 0.40 - 4.00 mU/L   UA with Microscopic reflex to Culture     Status: Abnormal   Result Value Ref Range    Color Urine Yellow     Appearance Urine Clear     Glucose Urine Negative NEG^Negative mg/dL    Bilirubin Urine Negative NEG^Negative    Ketones Urine Negative NEG^Negative mg/dL    Specific Gravity Urine <=1.005 1.003 - 1.035    pH Urine 7.0 5.0 - 7.0 pH    Protein Albumin Urine Negative NEG^Negative mg/dL    Urobilinogen Urine 0.2 0.2 - 1.0 EU/dL    Nitrite Urine Negative NEG^Negative    Blood Urine Trace (A) NEG^Negative    Leukocyte Esterase Urine Small (A) NEG^Negative    Source Midstream Urine     WBC Urine 5-10 (A) OTO5^0 - 5 /HPF    RBC Urine 2-5 (A) OTO2^O - 2 /HPF    Squamous Epithelial /LPF Urine Many (A) FEW^Few /LPF   D dimer quantitative     Status: None   Result Value Ref Range    D Dimer <0.3 0.0 - 0.50 ug/ml FEU   Erythrocyte sedimentation rate auto     Status: None   Result Value Ref Range    Sed Rate 4 0 - 20 mm/h   Strep, Rapid Screen     Status: None   Result Value Ref Range    Specimen Description Throat     Rapid Strep A Screen       NEGATIVE: No Group A streptococcal antigen detected by immunoassay, await culture report.     Chest ray Negative for acute findings, read by Reji ECKERT at time of visit.    ASSESSMENT/PLAN      ICD-10-CM    1. Throat pain R07.0 Strep, Rapid Screen     Beta strep group A culture   2. Panic attack F41.0 CBC with platelets differential     TSH with free T4 reflex     sertraline (ZOLOFT) 25 MG tablet     hydrOXYzine (ATARAX) 25 MG tablet   3. Dizziness R42 CBC with platelets differential     Comprehensive metabolic panel     TSH with free T4 reflex     UA with Microscopic reflex to Culture     sertraline (ZOLOFT) 25 MG tablet   4. SOB (shortness of breath) R06.02 D dimer quantitative     XR Chest 2 Views     Erythrocyte sedimentation rate auto   5. Urinary  tract infection without hematuria, site unspecified N39.0 Urine Culture Aerobic Bacterial     nitroFURantoin macrocrystal-monohydrate (MACROBID) 100 MG capsule   6. Generalized anxiety disorder F41.1 sertraline (ZOLOFT) 25 MG tablet       Orders Placed This Encounter     XR Chest 2 Views     CBC with platelets differential     Comprehensive metabolic panel     TSH with free T4 reflex     UA with Microscopic reflex to Culture     D dimer quantitative     Erythrocyte sedimentation rate auto     nitroFURantoin macrocrystal-monohydrate (MACROBID) 100 MG capsule     sertraline (ZOLOFT) 25 MG tablet     hydrOXYzine (ATARAX) 25 MG tablet     D-dimer to rule out PE  CBC to rule out anemia  CMP to rule out diabetes  TSH to rule out thyroid disorder  Chest xray negative for pneumonia  Urine culture pending  Will treat with macrobid for UTI  Will treat with zoloft and atarax  Follow up with PCP in 30 days for re-evaluation

## 2019-12-31 LAB
BACTERIA SPEC CULT: NORMAL
SPECIMEN SOURCE: NORMAL

## 2020-01-01 LAB
BACTERIA SPEC CULT: ABNORMAL
BACTERIA SPEC CULT: ABNORMAL
SPECIMEN SOURCE: ABNORMAL

## 2020-01-02 ENCOUNTER — TELEPHONE (OUTPATIENT)
Dept: URGENT CARE | Facility: URGENT CARE | Age: 30
End: 2020-01-02

## 2020-01-02 DIAGNOSIS — N30.00 ACUTE CYSTITIS WITHOUT HEMATURIA: Primary | ICD-10-CM

## 2020-01-02 RX ORDER — CEFPROZIL 500 MG/1
500 TABLET, FILM COATED ORAL 2 TIMES DAILY
Qty: 14 TABLET | Refills: 0 | Status: SHIPPED | OUTPATIENT
Start: 2020-01-02 | End: 2020-01-20

## 2020-01-02 NOTE — TELEPHONE ENCOUNTER
Inform patient that her urine grew out bacteria that is resistant to macrobid.  Stop macrobid and start Cefzil.  Its been called into her [facility] pharmacy.    Thank you  TOMEKA CrandallC

## 2020-01-08 ENCOUNTER — OFFICE VISIT (OUTPATIENT)
Dept: URGENT CARE | Facility: URGENT CARE | Age: 30
End: 2020-01-08
Payer: COMMERCIAL

## 2020-01-08 VITALS
BODY MASS INDEX: 23.46 KG/M2 | TEMPERATURE: 98.4 F | DIASTOLIC BLOOD PRESSURE: 68 MMHG | WEIGHT: 141 LBS | SYSTOLIC BLOOD PRESSURE: 118 MMHG | HEART RATE: 71 BPM | RESPIRATION RATE: 18 BRPM | OXYGEN SATURATION: 97 %

## 2020-01-08 DIAGNOSIS — J04.0 LARYNGITIS: Primary | ICD-10-CM

## 2020-01-08 PROCEDURE — 99213 OFFICE O/P EST LOW 20 MIN: CPT | Performed by: PHYSICIAN ASSISTANT

## 2020-01-08 RX ORDER — PREDNISONE 20 MG/1
20 TABLET ORAL DAILY
Qty: 10 TABLET | Refills: 0 | Status: SHIPPED | OUTPATIENT
Start: 2020-01-08 | End: 2020-01-20

## 2020-01-09 NOTE — PATIENT INSTRUCTIONS
Patient Education     Laryngitis    Laryngitis is a swelling of the tissues around the vocal cords. Symptoms include a hoarse (scratchy) voice. Or your voice may be gone for a few days or longer. This may be caused by a viral illness, such as a head or chest cold. It may also be due to overuse and strain of your voice. Smoking, drinking alcohol, acid reflux, allergies, or inhaling harsh chemicals may also lead to symptoms. This condition will usually go away in 1-2 weeks.  Home care    Rest your voice until it recovers. Talk as little as possible. If your symptoms are severe, rest at home for a day or so.    Moist air may help your symptoms. Try breathing cool steam from a humidifier or vaporizer. Or breathe air from a steamy shower.    Drink plenty of fluids to stay well hydrated.    Do not smoke  Follow-up care  Follow up with your healthcare provider or this facility if you are not better after 1 week. If your hoarse voice lasts more than 2 weeks, you may need to see an otolaryngologist. This is a doctor who treats diseases and disorders of the ear, nose, and throat (ENT). Seeing this doctor is especially important if you have a history of alcohol or tobacco use.  When to seek medical advice  Contact your healthcare provider if you have any of the following:    Symptoms that get worse    Severe pain with swallowing    Trouble opening your mouth    Neck swelling, neck pain, or trouble moving your neck    Noisy breathing or trouble breathing    Fever of 100.4 F (38. C) or higher, or as directed by your healthcare provider    Drooling    Symptoms do not go away in 2 weeks  Date Last Reviewed: 11/1/2017 2000-2019 [employer]. [address] [place] [address], [place] [address]. All rights reserved. This information is not intended as a substitute for professional medical care. Always follow your healthcare professional's instructions.

## 2020-01-09 NOTE — PROGRESS NOTES
SUBJECTIVE:   Miracle Robertson is a 29 year old female presenting with a chief complaint of hoarse voice and irritation when swallowing following cold-like symptoms 1 weeks ago.  Onset of symptoms was 3 day(s) ago.  Course of illness is same.    Severity moderate  Treatment measures tried include None tried.  Predisposing factors include None.     Patient was negative for strep at last visit.     Past Medical History:   Diagnosis Date     Allergic rhinitis      Recurrent otitis media     as child      Current Outpatient Medications   Medication Sig Dispense Refill     cefPROZIL (CEFZIL) 500 MG tablet Take 1 tablet (500 mg) by mouth 2 times daily 14 tablet 0     desogestrel-ethinyl estradiol (APRI) 0.15-30 MG-MCG tablet Take 1 tablet by mouth daily 84 tablet 3     sertraline (ZOLOFT) 25 MG tablet 1/2 tab po every day for 1-2 wks then 1 tab per day 30 tablet 0     budesonide (RINOCORT AQUA) 32 MCG/ACT spray Spray 1 spray into both nostrils daily       hydrOXYzine (ATARAX) 25 MG tablet Take 1-2 tablets (25-50 mg) by mouth 3 times daily as needed for anxiety (insomnia) (Patient not taking: Reported on 1/8/2020) 30 tablet 0     nitroFURantoin macrocrystal-monohydrate (MACROBID) 100 MG capsule Take 1 capsule (100 mg) by mouth 2 times daily (Patient not taking: Reported on 1/8/2020) 14 capsule 0     triamcinolone (KENALOG) 0.1 % external ointment Apply topically 2 times daily Until resolved or 14 d whichever is sooner (Patient not taking: Reported on 1/8/2020) 30 g 0     Social History     Tobacco Use     Smoking status: Never Smoker     Smokeless tobacco: Never Used   Substance Use Topics     Alcohol use: Yes     Alcohol/week: 1.0 - 2.0 standard drinks     Types: 1 - 2 Standard drinks or equivalent per week     Frequency: 2-4 times a month     Drinks per session: 1 or 2     Binge frequency: Never       ROS:  CONSTITUTIONAL:NEGATIVE for fever, chills, change in weight  INTEGUMENTARY/SKIN: NEGATIVE for worrisome rashes, moles  or lesions  EYES: NEGATIVE for vision changes or irritation  ENT/MOUTH: See HPI  RESP:NEGATIVE for significant cough or SOB  CV: NEGATIVE for chest pain, palpitations or peripheral edema  GI: NEGATIVE for nausea, abdominal pain, heartburn, or change in bowel habits  HEME/ALLERGY/IMMUNE: NEGATIVE for bleeding problems  PSYCHIATRIC: NEGATIVE for changes in mood or affect    OBJECTIVE:  /68   Pulse 71   Temp 98.4  F (36.9  C) (Tympanic)   Resp 18   Wt 64 kg (141 lb)   SpO2 97%   BMI 23.46 kg/m    GENERAL APPEARANCE: healthy, alert and no distress  EYES: EOMI,  PERRL, conjunctiva clear  HENT: ear canals and TM's normal.  Nose and mouth without ulcers, erythema or lesions  NECK: supple, nontender, no lymphadenopathy  RESP: lungs clear to auscultation - no rales, rhonchi or wheezes  CV: regular rates and rhythm, normal S1 S2, no murmur noted  ABDOMEN:  soft, nontender, no HSM or masses and bowel sounds normal  NEURO: Normal strength and tone, sensory exam grossly normal,  normal speech and mentation  SKIN: no suspicious lesions or rashes    ASSESSMENT/PLAN:    (J04.0) Laryngitis  (primary encounter diagnosis)  Plan: predniSONE (DELTASONE) 20 MG tablet    After prednisone, okay to take the following:   Okay to take acetaminophen 500 mg- 2 tabs (Total of 1000 mg) every 8 hrs   Okay to take ibuprofen 200 mg- 3 tabs (Total of 600 mg) every 6 hours          Patient Education     Laryngitis    Laryngitis is a swelling of the tissues around the vocal cords. Symptoms include a hoarse (scratchy) voice. Or your voice may be gone for a few days or longer. This may be caused by a viral illness, such as a head or chest cold. It may also be due to overuse and strain of your voice. Smoking, drinking alcohol, acid reflux, allergies, or inhaling harsh chemicals may also lead to symptoms. This condition will usually go away in 1-2 weeks.  Home care    Rest your voice until it recovers. Talk as little as possible. If your  symptoms are severe, rest at home for a day or so.    Moist air may help your symptoms. Try breathing cool steam from a humidifier or vaporizer. Or breathe air from a steamy shower.    Drink plenty of fluids to stay well hydrated.    Do not smoke  Follow-up care  Follow up with your healthcare provider or this facility if you are not better after 1 week. If your hoarse voice lasts more than 2 weeks, you may need to see an otolaryngologist. This is a doctor who treats diseases and disorders of the ear, nose, and throat (ENT). Seeing this doctor is especially important if you have a history of alcohol or tobacco use.  When to seek medical advice  Contact your healthcare provider if you have any of the following:    Symptoms that get worse    Severe pain with swallowing    Trouble opening your mouth    Neck swelling, neck pain, or trouble moving your neck    Noisy breathing or trouble breathing    Fever of 100.4 F (38. C) or higher, or as directed by your healthcare provider    Drooling    Symptoms do not go away in 2 weeks  Date Last Reviewed: 11/1/2017 2000-2019 [employer]. [address] [place] [address]. All rights reserved. This information is not intended as a substitute for professional medical care. Always follow your healthcare professional's instructions.    Reji Buenrostro PA-C on 1/8/2020 at 7:39 PM

## 2020-01-20 ENCOUNTER — OFFICE VISIT (OUTPATIENT)
Dept: INTERNAL MEDICINE | Facility: CLINIC | Age: 30
End: 2020-01-20
Payer: COMMERCIAL

## 2020-01-20 VITALS
DIASTOLIC BLOOD PRESSURE: 70 MMHG | OXYGEN SATURATION: 99 % | HEART RATE: 90 BPM | BODY MASS INDEX: 23.81 KG/M2 | SYSTOLIC BLOOD PRESSURE: 104 MMHG | RESPIRATION RATE: 18 BRPM | WEIGHT: 143.1 LBS

## 2020-01-20 DIAGNOSIS — J02.9 SORE THROAT: ICD-10-CM

## 2020-01-20 DIAGNOSIS — F41.1 GAD (GENERALIZED ANXIETY DISORDER): ICD-10-CM

## 2020-01-20 DIAGNOSIS — R06.02 SHORTNESS OF BREATH: ICD-10-CM

## 2020-01-20 DIAGNOSIS — F41.0 PANIC ATTACK: ICD-10-CM

## 2020-01-20 DIAGNOSIS — Z23 NEED FOR INFLUENZA VACCINATION: ICD-10-CM

## 2020-01-20 DIAGNOSIS — R09.A2 GLOBUS SENSATION: Primary | ICD-10-CM

## 2020-01-20 PROCEDURE — 90686 IIV4 VACC NO PRSV 0.5 ML IM: CPT | Performed by: INTERNAL MEDICINE

## 2020-01-20 PROCEDURE — 90471 IMMUNIZATION ADMIN: CPT | Performed by: INTERNAL MEDICINE

## 2020-01-20 PROCEDURE — 99214 OFFICE O/P EST MOD 30 MIN: CPT | Mod: 25 | Performed by: INTERNAL MEDICINE

## 2020-01-20 RX ORDER — ALPRAZOLAM 0.5 MG
0.5 TABLET ORAL DAILY PRN
Qty: 10 TABLET | Refills: 0 | Status: SHIPPED | OUTPATIENT
Start: 2020-01-20 | End: 2020-09-17

## 2020-01-20 RX ORDER — ALBUTEROL SULFATE 90 UG/1
2 AEROSOL, METERED RESPIRATORY (INHALATION) EVERY 6 HOURS
Qty: 1 INHALER | Refills: 0 | Status: SHIPPED | OUTPATIENT
Start: 2020-01-20 | End: 2021-07-28

## 2020-01-20 RX ORDER — OMEPRAZOLE 40 MG/1
40 CAPSULE, DELAYED RELEASE ORAL DAILY
Qty: 90 CAPSULE | Refills: 0 | Status: SHIPPED | OUTPATIENT
Start: 2020-01-20 | End: 2020-04-21

## 2020-01-20 RX ORDER — FAMOTIDINE 40 MG/1
40 TABLET, FILM COATED ORAL 2 TIMES DAILY PRN
Qty: 60 TABLET | Refills: 0 | Status: SHIPPED | OUTPATIENT
Start: 2020-01-20 | End: 2020-03-02

## 2020-01-20 ASSESSMENT — ANXIETY QUESTIONNAIRES
6. BECOMING EASILY ANNOYED OR IRRITABLE: SEVERAL DAYS
GAD7 TOTAL SCORE: 11
IF YOU CHECKED OFF ANY PROBLEMS ON THIS QUESTIONNAIRE, HOW DIFFICULT HAVE THESE PROBLEMS MADE IT FOR YOU TO DO YOUR WORK, TAKE CARE OF THINGS AT HOME, OR GET ALONG WITH OTHER PEOPLE: SOMEWHAT DIFFICULT
5. BEING SO RESTLESS THAT IT IS HARD TO SIT STILL: MORE THAN HALF THE DAYS
3. WORRYING TOO MUCH ABOUT DIFFERENT THINGS: SEVERAL DAYS
7. FEELING AFRAID AS IF SOMETHING AWFUL MIGHT HAPPEN: MORE THAN HALF THE DAYS
1. FEELING NERVOUS, ANXIOUS, OR ON EDGE: MORE THAN HALF THE DAYS
2. NOT BEING ABLE TO STOP OR CONTROL WORRYING: SEVERAL DAYS

## 2020-01-20 ASSESSMENT — PATIENT HEALTH QUESTIONNAIRE - PHQ9: 5. POOR APPETITE OR OVEREATING: MORE THAN HALF THE DAYS

## 2020-01-20 NOTE — PROGRESS NOTES
"  SUBJECTIVE:                                                      HPI: Miracle Robertson is a pleasant 29 year old female who presents for follow-up:    Patient has had two panic attacks in the last several weeks. She has been feeling lightheaded, fatigued, and, generally, unwell for the last couple of months. She has had ongoing symptoms including feeling \"like a Lego is stuck in my throat,\" a sore throat, and shortness of breath with exertion or talking a lot. Of note, she reports that her symptoms are worse at night.    She was seen in urgent care a couple of weeks ago for above symptoms. Labs and CXR within normal limits. She was prescribed low-dose Zoloft and hydroxyzine as needed for suspected anxiety and panic attacks, respectively. Patient also started omeprazole 20 mg daily per the advice of her PA sister-in-law. She is also been using ibuprofen at least 3 times a day for her sore throat.     Patient says that the Zoloft has made her feel \"zoned out\" and has made sleeping difficult. She maybe feels a little less anxious, but is not sure. Patient believes that the omeprazole and ibuprofen have been helpful.    No tobacco or illicit drug use. Rare alcohol use.    The medication, allergy, and problem lists have been reviewed and updated as appropriate.     OBJECTIVE:                                                      /70   Pulse 90   Resp 18   Wt 64.9 kg (143 lb 1.6 oz)   LMP 12/23/2019 (Exact Date)   SpO2 99%   BMI 23.81 kg/m    Constitutional: well-appearing  Head, Ears, Nose, and Throat: normocephalic, atraumatic; normal external auditory canal and pinna; tympanic membranes visualized and normal; nasal septum straight; nasal mucosa pink; no oropharyngeal lesions or ulcers; dentition normal; no tonsillar exudates  Neck: supple, symmetric, no thyromegaly or lymphadenopathy  Respiratory: normal respiratory effort; clear to auscultation bilaterally  Cardiovascular: regular rate and rhythm; no " edema  Gastrointestinal: soft, non-tender, non-distended, and bowel sounds present; no organomegaly or masses   Musculoskeletal: normal gait and station  Psych: normal judgment and insight; normal mood and affect; recent and remote memory intact    ASSESSMENT/PLAN:                                                      (R09.89) Globus sensation  (primary encounter diagnosis)  (J02.9) Sore throat  (R06.02) Shortness of breath  Comment: suspect poorly controlled acid reflux is causing globus sensation, sore throat, and asthma-like symptoms; would hold off on formal PFTs and upper endoscopy until treating suspected acid reflux.  Plan:    - INCREASE omeprazole from 20 to 40 mg daily.   - Pepcid twice daily as needed for sore throat/globus sensation.   - STOP using NSAIDs - likely contributing to acid reflux.   - AVOID acid reflux exacerbating foods/behaviors.   - albuterol inhaler as needed for shortness of breath.   - follow-up in 4-6 weeks (earlier as needed).     (F41.0) Panic attack  (F41.1) ODETTE (generalized anxiety disorder)  Comment: suspect anxiety and panic attacks are related to above; having side effects with Zoloft.  Plan:    - see above for plans.   - wean off of Zoloft.   - Xanax available as needed for acute anxiety.   - referred to counseling for further evaluation and treatment - patient will be contacted to schedule.    (Z23) Need for influenza vaccination  Plan: flu shot given today.    The instructions on the AVS were discussed and explained to the patient. Patient expressed understanding of instructions.    A total of 30 minutes were spent face-to-face with this patient during this encounter and over half of that time was spent on counseling and coordination of care re: above diagnoses and plans of care.     (Chart documentation was completed, in part, with [employer] voice-recognition software. Even though reviewed, some grammatical, spelling, and word errors may remain.)    Margo Mane MD   [facility]  [address] [address]  T: [phone], F: [phone]

## 2020-01-20 NOTE — PATIENT INSTRUCTIONS
Flu shot today.    ---    Albuterol inhaler  - 1-2 inhalations every 4-6 hours as needed for shortness of breath (watch Youtube videos on appropriate use).    ---    Omeprazole 40mg daily and consistently for at least 4-6 weeks.    May use Pepcid as needed for acute relief of throat symptoms.     ---    Would hold off on pulmonary function testing and upper endoscopy for now.    ---    Recommend stopping Zoloft - not helping significantly and likely causing fatigue/difficulty sleeping.    Can wean if desired. 1/2 tab daily x 1-2 weeks then stop.     ---    Follow-up with me in 4-6 weeks please.

## 2020-01-21 ASSESSMENT — ANXIETY QUESTIONNAIRES: GAD7 TOTAL SCORE: 11

## 2020-01-22 ENCOUNTER — MYC MEDICAL ADVICE (OUTPATIENT)
Dept: INTERNAL MEDICINE | Facility: CLINIC | Age: 30
End: 2020-01-22

## 2020-01-28 ENCOUNTER — OFFICE VISIT (OUTPATIENT)
Dept: PSYCHOLOGY | Facility: CLINIC | Age: 30
End: 2020-01-28
Attending: INTERNAL MEDICINE
Payer: COMMERCIAL

## 2020-01-28 DIAGNOSIS — F41.1 GAD (GENERALIZED ANXIETY DISORDER): Primary | ICD-10-CM

## 2020-01-28 PROCEDURE — 90791 PSYCH DIAGNOSTIC EVALUATION: CPT | Performed by: MARRIAGE & FAMILY THERAPIST

## 2020-01-28 NOTE — Clinical Note
Thank you for referring Miracle to counseling.Lily Alexandre, [facility]

## 2020-01-29 ASSESSMENT — COLUMBIA-SUICIDE SEVERITY RATING SCALE - C-SSRS
2. HAVE YOU ACTUALLY HAD ANY THOUGHTS OF KILLING YOURSELF?: NO
6. HAVE YOU EVER DONE ANYTHING, STARTED TO DO ANYTHING, OR PREPARED TO DO ANYTHING TO END YOUR LIFE?: NO
TOTAL  NUMBER OF INTERRUPTED ATTEMPTS LIFETIME: NO
TOTAL  NUMBER OF ABORTED OR SELF INTERRUPTED ATTEMPTS PAST LIFETIME: NO
4. HAVE YOU HAD THESE THOUGHTS AND HAD SOME INTENTION OF ACTING ON THEM?: NO
1. IN THE PAST MONTH, HAVE YOU WISHED YOU WERE DEAD OR WISHED YOU COULD GO TO SLEEP AND NOT WAKE UP?: NO
5. HAVE YOU STARTED TO WORK OUT OR WORKED OUT THE DETAILS OF HOW TO KILL YOURSELF? DO YOU INTEND TO CARRY OUT THIS PLAN?: NO
2. HAVE YOU ACTUALLY HAD ANY THOUGHTS OF KILLING YOURSELF LIFETIME?: NO
3. HAVE YOU BEEN THINKING ABOUT HOW YOU MIGHT KILL YOURSELF?: NO
ATTEMPT PAST THREE MONTHS: NO
1. IN THE PAST MONTH, HAVE YOU WISHED YOU WERE DEAD OR WISHED YOU COULD GO TO SLEEP AND NOT WAKE UP?: NO
4. HAVE YOU HAD THESE THOUGHTS AND HAD SOME INTENTION OF ACTING ON THEM?: NO
TOTAL  NUMBER OF ABORTED OR SELF INTERRUPTED ATTEMPTS PAST 3 MONTHS: NO
TOTAL  NUMBER OF INTERRUPTED ATTEMPTS PAST 3 MONTHS: NO
5. HAVE YOU STARTED TO WORK OUT OR WORKED OUT THE DETAILS OF HOW TO KILL YOURSELF? DO YOU INTEND TO CARRY OUT THIS PLAN?: NO
ATTEMPT LIFETIME: NO
6. HAVE YOU EVER DONE ANYTHING, STARTED TO DO ANYTHING, OR PREPARED TO DO ANYTHING TO END YOUR LIFE?: NO

## 2020-01-29 ASSESSMENT — ANXIETY QUESTIONNAIRES
5. BEING SO RESTLESS THAT IT IS HARD TO SIT STILL: SEVERAL DAYS
IF YOU CHECKED OFF ANY PROBLEMS ON THIS QUESTIONNAIRE, HOW DIFFICULT HAVE THESE PROBLEMS MADE IT FOR YOU TO DO YOUR WORK, TAKE CARE OF THINGS AT HOME, OR GET ALONG WITH OTHER PEOPLE: SOMEWHAT DIFFICULT
7. FEELING AFRAID AS IF SOMETHING AWFUL MIGHT HAPPEN: SEVERAL DAYS
GAD7 TOTAL SCORE: 7
2. NOT BEING ABLE TO STOP OR CONTROL WORRYING: SEVERAL DAYS
3. WORRYING TOO MUCH ABOUT DIFFERENT THINGS: SEVERAL DAYS
1. FEELING NERVOUS, ANXIOUS, OR ON EDGE: SEVERAL DAYS
6. BECOMING EASILY ANNOYED OR IRRITABLE: SEVERAL DAYS

## 2020-01-29 ASSESSMENT — PATIENT HEALTH QUESTIONNAIRE - PHQ9
SUM OF ALL RESPONSES TO PHQ QUESTIONS 1-9: 3
5. POOR APPETITE OR OVEREATING: SEVERAL DAYS

## 2020-01-29 NOTE — PROGRESS NOTES
Adult Intake Structured Interview  Standard Diagnostic Assessment      CLIENT'S NAME: Miracle Robertson  MRN:   [id number]  :   1990  ACCT. NUMBER: [id number]  DATE OF SERVICE: 20  VIDEO VISIT: No    Identifying Information:  Client is a 29 year old, , partnered / significant other female. Client was referred for counseling by self. Client is currently employed full time and reports @HIS@ is able to function appropriately at work.. Client attended the session alone. Client has been working with her MD on issues related to her anxiety. They both thought counseling could be helpful.      Client's Statement of Presenting Concern:  Client reports the reason for seeking therapy at this time as needing help managing her anxiety that culminated in a panic attack in December and one since then. She is dealing with throat pain which could be related to acid reflux but her MD is helping her with this problem. She needs help navigating a concern with her live-in relationship. Her SO is drinking potentially to excess and she needs help managing her feelings about this as well as communicating concerns to him. This has not been well received by him and he gets defensive. Client does not feel emotionally safe about him drinking as much as he does and although he is a very supportive, responsible person in many aspects of his life, this remains a big concern for her. Therapist processed her feelings and concerns in an effort to better understand her perspective. She is eager to identify all stressors in her life to mitigate having any more panic attacks.  Client stated that her symptoms have resulted in the following functional impairments: home life with her SO, relationship(s), self-care and work / vocational responsibilities      History of  Presenting Concern:  Client reports that these problem(s) began in December when she had a very distressing panic attack. Client had never had one before and she is still feeling the effects from this. Her heart was racing periodically for a time although this has resolved. She now has a lump in her throat which her MD has diagnosed as possible acid reflux s/e. Client realizes her home life can be stressful and she seeks help trying to determine how to proceed. Client has attempted to resolve these concerns in the past through working with her MD on the anxiety and health concerns.. Client reports that other professional(s) are involved in providing support / services. Client's MD Dr. Mane is assisting.      Social History:  Client reported she grew up in [place], [place] (near [place]). They were the third born of 3 children. This is an intact family and parents remain . Client reported that her childhood was great- very loving and nurturing. She has very close family relationships. Client described her current relationships with family of origin as very good.    Client reported a history of 1 committed relationships or marriages. Client has been partnered / significant other for 2 years. Client reported having 0 children. Client identified some stable and meaningful social connections. Client reported that she has not been involved with the legal system.  Client's highest education level was graduate school. Client did not identify any learning problems. There are no ethnic, cultural or Gnosticism factors that may be relevant for therapy. Client identified her preferred language to be English. Client reported she does not need the assistance of an  or other support involved in therapy. Modifications will not be used to assist communication in therapy. Client did not serve in the . \    Client reports family history includes Breast Cancer in her maternal grandmother.    Mental Health  History:  Client reported the following biological family members or relatives with mental health issues: Adopted brother.  Client previously received the following mental health diagnosis: an Eating disorder.  Client has received the following mental health services in the past: counseling.  Hospitalizations: None.  Client is not currently receiving any mental health services.      Chemical Health History:  Client reported her adopted brother has CD issues.. Client has not received chemical dependency treatment in the past. Client is not currently receiving any chemical dependency treatment. Client reports no problems as a result of their drinking / drug use.  Her father's side of the family has had substance abuse problems including paternal grandparents and uncle. Not in her immediate family.    Client Reports:  Client denies using alcohol.  Client denies using tobacco.  Client denies using marijuana.  Client denies using caffeine.  Client denies using street drugs.  Client denies the non-medical use of prescription or over the counter drugs.    CAGE: None of the patient's responses to the CAGE screening were positive / Negative CAGE score   Based on the negative Cage-Aid score and clinical interview there  are not indications of drug or alcohol abuse.    Discussed the general effects of drugs and alcohol on health and well-being. Therapist gave client printed information about the effects of chemical use on her health and well being.      Significant Losses / Trauma / Abuse / Neglect Issues:  There are indications or report of significant loss, trauma, abuse or neglect issues related to family members but not client specifically: death of her grandmother dying when her mother was 16 and her brother going to drug treatment. Also extended family members experiencing divorce.    Issues of possible neglect are not present.      Medical Issues:  Client has had a physical exam to rule out medical causes for current  symptoms. Date of last physical exam was within the past year. Client was encouraged to follow up with PCP if symptoms were to develop. The client has a [facility] Provider, who is named Margo Mane. The client reports not having a psychiatrist. Client reports the following current medical concerns: Acid reflux. The client reports the presence of chronic or episodic pain in the form of throat pain. The pain level is moderate and has a frequency of constant.. There are not significant nutritional concerns. Client had an ED 7 years ago.    Patient reports current meds as:   No outpatient medications have been marked as taking for the 1/28/20 encounter (Office Visit) with Lily Alexandre.       Client Allergies:  No Known Allergies  no known allergies to medications    Medical History:  Past Medical History:   Diagnosis Date     Allergic rhinitis      Recurrent otitis media     as child          Medication Adherence:  N/A - Client does not have prescribed psychiatric medications.    Client was provided recommendation to follow-up with prescribing physician.    Mental Status Assessment:  Appearance:   Appropriate   Eye Contact:   Good   Psychomotor Behavior: Normal   Attitude:   Cooperative   Orientation:   All  Speech   Rate / Production: Normal    Volume:  Normal   Mood:    Anxious  Sad   Affect:    Worrisome   Thought Content:  Clear   Thought Form:  Coherent  Logical   Insight:    Good       Review of Symptoms:  Depression: Sleep Energy Ruminations Irritability  Rosalie:  No symptoms  Psychosis: No symptoms  Anxiety: Worries Nervousness Triggers: her boyfriend's drinking   Panic:  No symptoms  Post Traumatic Stress Disorder: No symptoms  Obsessive Compulsive Disorder: No symptoms  Eating Disorder: No symptoms  Oppositional Defiant Disorder: No symptoms  ADD / ADHD: No symptoms  Conduct Disorder: No symptoms      Safety Assessment:    History of Safety Concerns:   Client denied a history of  suicidal ideation.    Client denied a history of suicide attempts.    Client denied a history of homicidal ideation.    Client denied a history of self-injurious ideation and behaviors.    Client denied a history of personal safety concerns.    Client denied a history of assaultive behaviors.        Current Safety Concerns:  Client denies current suicidal ideation.    Client denies current homicidal ideation and behaviors.  Client denies current self-injurious ideation and behaviors.    Client denies current concerns for personal safety.    Client reports the following protective factors: spirituality, positive relationships positive social network, forward/future oriented thinking, dedication to family/friends, safe and stable environment, regular sleep, regular physical activity, purpose loves her job, secure attachment, help seeking behaviors when distressed scheduled therapy, abstinence from substances, adherence with prescribed medication, agreement to use safety plan, living with other people, daily obligations, structured day, uses community crisis resources, effective problem-solving skills, committment to well-being, sense of meaning, positive social skills, healthy fear of risky behaviors or pain, financial stability, strong sense of self-worth/esteem, sense of personal control or determination, access to a variety of clinical interventions and pets    Client reports there are no firearms in the house.     Plan for Safety and Risk Management:  Recommended that patient call 911 or go to the local ED should there be a change in any of these risk factors.    Client's Strengths and Limitations:  Client identified the following strengths or resources that will help her succeed in counseling: Quaker / Nondenominational, commitment to health and well being, exercise routine, agustin / spirituality, friends / good social support, family support, insight, intelligence, positive work environment, motivation, strong social  skills and work ethic. Client identified the following supports: family, Holiness / spirituality and friends. Things that may interfere with the client's success in counseling include: financial hardship and lack of time.      Diagnostic Criteria:  A. Excessive anxiety and worry about a number of events or activities (such as work or school performance).   B. The person finds it difficult to control the worry.   - Restlessness or feeling keyed up or on edge.    - Being easily fatigued.    - Difficulty concentrating or mind going blank.    - Irritability.    - Muscle tension.    - Sleep disturbance (difficulty falling or staying asleep, or restless unsatisfying sleep).   E. The anxiety, worry, or physical symptoms cause clinically significant distress or impairment in social, occupational, or other important areas of functioning.   F. The disturbance is not due to the direct physiological effects of a substance (e.g., a drug of abuse, a medication) or a general medical condition (e.g., hyperthyroidism) and does not occur exclusively during a Mood Disorder, a Psychotic Disorder, or a Pervasive Developmental Disorder.      Functional Status:  Client's symptoms have caused reduced functional status in the following areas: Activities of Daily Living - health issues  Occupational / Vocational - managing stressful jobs-needing boundaries  Social / Relational - relationship conflict      DSM5 Diagnoses: (Sustained by DSM5 Criteria Listed Above)  Diagnoses: 300.02 (F41.1) Generalized Anxiety Disorder  Psychosocial & Contextual Factors: relationship discord, financial  WHODAS 2.0 (12 item)            This questionnaire asks about difficulties due to health conditions. Health conditions  include  disease or illnesses, other health problems that may be short or long lasting,  injuries, mental health or emotional problems, and problems with alcohol or drugs.                     Think back over the past 30 days and answer these  questions, thinking about how much  difficulty you had doing the following activities. For each question, please [place] only  one response.    S1 Standing for long periods such as 30 minutes? None =         1   S2 Taking care of household responsibilities? Mild =           2   S3 Learning a new task, for example, learning how to get to a new place? Mild =           2   S4 How much of a problem do you have joining community activities (for example, festivals, Gnosticist or other activities) in the same way as anyone else can? Moderate =   3   S5 How much have you been emotionally affected by your health problems? Severe =       4     In the past 30 days, how much difficulty did you have in:   S6 Concentrating on doing something for ten minutes? Mild =           2   S7 Walking a long distance such as a kilometer (or equivalent)? None =         1   S8 Washing your whole body? None =         1   S9 Getting dressed? Mild =           2   S10 Dealing with people you do not know? Mild =           2   S11 Maintaining a friendship? None =         1   S12 Your day to day work? Moderate =   3     H1 Overall, in the past 30 days, how many days were these difficulties present? Record number of days 15   H2 In the past 30 days, for how many days were you totally unable to carry out your usual activities or work because of any health condition? Record number of days  4   H3 In the past 30 days, not counting the days that you were totally unable, for how many days did you cut back or reduce your usual activities or work because of any health condition? Record number of days 15     Attendance Agreement:  Client has signed Attendance Agreement:Yes      Collaboration:  Collaboration / coordination with other professionals is not indicated at this time.      Preliminary Treatment Plan:  The client reports no currently identified Gnosticist, ethnic or cultural issues relevant to therapy.     services are not  indicated.    Modifications to assist communication are not indicated.    The concerns identified by the client will be addressed in therapy.    Initial Treatment will focus on: Anxiety - improve coping and provide supportive talk therapy..    As a preliminary treatment goal, client will experience a reduction in anxiety, will develop more effective coping skills to manage anxiety symptoms and will develop healthy cognitive patterns and beliefs.    The focus of initial interventions will be to alleviate anxiety, facilitate appropriate expression of feelings, increase ability to function adaptively, increase coping skills and provide psychoeduction regarding anxiety. Client processed her concerns re: her SO drinking. He is not willing to make further changes (drinks 3-5 whiskey cokes Thur-Sun). Was drinking more before she complained. She is not sure if he has a problem but it makes her feel worried and he gets defensive when they talk about it. He is not interested in couples therapy. She does not know if she could break up with him and with living together, it causes a lot of concern. Therapist validated her concerns and his unwillingness to seek tx. However, client has her own work to focus on at this time and encouraged her to not press him now while she focuses on herself and getting stronger. She does not have to make any decisions at this time and it may be most beneficial to wait until she is in a better place individually and continue to process in therapy to discover the best options for herself. Client is also spiritual and therapist encouraged her to be in prayer about it and have a daily time to reflect on her agustin.    Referral to another professional/service is not indicated at this time.    A Release of Information is not needed at this time.    Report to child / adult protection services was NA.    Patient will have open access to their mental health medical record.    Lily Alexandre  January  29, 2020

## 2020-01-30 ASSESSMENT — ANXIETY QUESTIONNAIRES: GAD7 TOTAL SCORE: 7

## 2020-02-04 ENCOUNTER — TRANSFERRED RECORDS (OUTPATIENT)
Dept: HEALTH INFORMATION MANAGEMENT | Facility: CLINIC | Age: 30
End: 2020-02-04

## 2020-02-10 ENCOUNTER — OFFICE VISIT (OUTPATIENT)
Dept: INTERNAL MEDICINE | Facility: CLINIC | Age: 30
End: 2020-02-10
Payer: COMMERCIAL

## 2020-02-10 VITALS
BODY MASS INDEX: 23.3 KG/M2 | DIASTOLIC BLOOD PRESSURE: 76 MMHG | SYSTOLIC BLOOD PRESSURE: 106 MMHG | OXYGEN SATURATION: 99 % | TEMPERATURE: 98.6 F | WEIGHT: 140 LBS | HEART RATE: 80 BPM | RESPIRATION RATE: 16 BRPM

## 2020-02-10 DIAGNOSIS — F41.1 GAD (GENERALIZED ANXIETY DISORDER): ICD-10-CM

## 2020-02-10 DIAGNOSIS — Z30.41 ENCOUNTER FOR SURVEILLANCE OF CONTRACEPTIVE PILLS: ICD-10-CM

## 2020-02-10 DIAGNOSIS — R07.0 THROAT PAIN: Primary | ICD-10-CM

## 2020-02-10 DIAGNOSIS — R09.89 THROAT TIGHTNESS: ICD-10-CM

## 2020-02-10 PROCEDURE — 99214 OFFICE O/P EST MOD 30 MIN: CPT | Performed by: INTERNAL MEDICINE

## 2020-02-10 RX ORDER — DESOGESTREL AND ETHINYL ESTRADIOL 0.15-0.03
KIT ORAL
Qty: 84 TABLET | Refills: 3 | Status: SHIPPED | OUTPATIENT
Start: 2020-02-10 | End: 2021-02-14

## 2020-02-10 RX ORDER — PAROXETINE 10 MG/1
20 TABLET, FILM COATED ORAL 2 TIMES DAILY
Qty: 60 TABLET | Refills: 0 | Status: SHIPPED | OUTPATIENT
Start: 2020-02-10 | End: 2020-03-02

## 2020-02-10 ASSESSMENT — PATIENT HEALTH QUESTIONNAIRE - PHQ9: 5. POOR APPETITE OR OVEREATING: MORE THAN HALF THE DAYS

## 2020-02-10 ASSESSMENT — ANXIETY QUESTIONNAIRES
7. FEELING AFRAID AS IF SOMETHING AWFUL MIGHT HAPPEN: MORE THAN HALF THE DAYS
IF YOU CHECKED OFF ANY PROBLEMS ON THIS QUESTIONNAIRE, HOW DIFFICULT HAVE THESE PROBLEMS MADE IT FOR YOU TO DO YOUR WORK, TAKE CARE OF THINGS AT HOME, OR GET ALONG WITH OTHER PEOPLE: NOT DIFFICULT AT ALL
2. NOT BEING ABLE TO STOP OR CONTROL WORRYING: SEVERAL DAYS
6. BECOMING EASILY ANNOYED OR IRRITABLE: NOT AT ALL
GAD7 TOTAL SCORE: 8
3. WORRYING TOO MUCH ABOUT DIFFERENT THINGS: SEVERAL DAYS
5. BEING SO RESTLESS THAT IT IS HARD TO SIT STILL: NOT AT ALL
1. FEELING NERVOUS, ANXIOUS, OR ON EDGE: MORE THAN HALF THE DAYS

## 2020-02-10 NOTE — PATIENT INSTRUCTIONS
Benzocaine gel/solution as needed for throat pain (due to aphthous ulcers/canker sores).     Paxil twice a day consistently.    Follow-up in 2-4 weeks please.

## 2020-02-10 NOTE — TELEPHONE ENCOUNTER
"Requested Prescriptions   Pending Prescriptions Disp Refills     ISIBLOOM 0.15-30 MG-MCG tablet [Pharmacy Med Name: ISIBLOOM 0.15MG-0.03MG TABLETS 28S] 84 tablet 3     Sig: TAKE 1 TABLET BY MOUTH DAILY       Contraceptives Protocol Passed - 2/10/2020  3:22 PM        Passed - Patient is not a current smoker if age is 35 or older        Passed - Recent (12 mo) or future (30 days) visit within the authorizing provider's specialty     Patient has had an office visit with the authorizing provider or a provider within the authorizing providers department within the previous 12 mos or has a future within next 30 days. See \"Patient Info\" tab in inbasket, or \"Choose Columns\" in Meds & Orders section of the refill encounter.              Passed - Medication is active on med list        Passed - No active pregnancy on record        Passed - No positive pregnancy test in past 12 months          Prescription approved per [facility] Refill Protocol.    Marielena DUTTAN, RN, PHN      "

## 2020-02-11 ASSESSMENT — ANXIETY QUESTIONNAIRES: GAD7 TOTAL SCORE: 8

## 2020-02-11 NOTE — PROGRESS NOTES
"  SUBJECTIVE:                                                      HPI: Miracle Robertson is a pleasant 29 year old female who presents with throat pain:    Patient was seen 3 weeks ago for a sore throat, throat tightness, globus sensation, shortness of breath, and anxiety. Acid reflux suspected and patient was started on a high dose PPI and H2B. Patient's Zoloft - which she'd only been on briefly - because it was making her feel \"zoned out.\" Patient's symptoms continued to significantly bother her, prompting an ENT consultation. This consultation was largely unremarkable, with findings suggestive of acid reflux. Continued PPI and H2B recommended.     Patient continues to have throat tightness and a globus sensation but reports that her sore throat has significantly worsened over the last couple of days. No fevers or chills. No fatigue, myalgias, or arthralgias. No sinus congestion, runny nose, or post nasal drip. No known sick exposures.     She has been feeling very emotional the last several days as well. She recently broke up with her long time boyfriend and is in the process of moving out of his place. Not sure where she's going to go and is concerned about finances.     The medication, allergy, and problem lists have been reviewed and updated as appropriate.     OBJECTIVE:                                                      /76   Pulse 80   Temp 98.6  F (37  C) (Oral)   Resp 16   Wt 63.5 kg (140 lb)   LMP 01/21/2020 (Exact Date)   SpO2 99%   BMI 23.30 kg/m    Constitutional: well-appearing  Oropharynx: mild erythema posteriorly + left-sided aphthous ulcer  Neck: supple, symmetric, no thyromegaly or lymphadenopathy  Respiratory: normal respiratory effort  Psych: normal judgment and insight; anxious mood and tearful throughout visit; recent and remote memory intact    ASSESSMENT/PLAN:                                                      (R07.0) Throat pain  (primary encounter diagnosis)  Comment: " related to aphthous ulcers; low suspicion for strep throat based on history and exam.   Plan: TRIAL of OTC Benzocaine as needed.     (R68.89) Throat tightness  Comment: likely related to patient's poorly controlled anxiety; suboptimally controlled acid reflux is also likely contributing.  Plan:    - continue daily PPI and H2B.   - treat anxiety - see next.     (F41.1) ODETTE (generalized anxiety disorder)  Comment: poorly controlled.   Plan:    - TRIAL of Paxil 10mg bid.   - follow-up in 2-4 weeks (earlier as needed).     The instructions on the AVS were discussed and explained to the patient. Patient expressed understanding of instructions.    (Chart documentation was completed, in part, with [employer] voice-recognition software. Even though reviewed, some grammatical, spelling, and word errors may remain.)    Margo Mane MD   [address] [address]  T: [phone], F: [phone]

## 2020-02-18 ENCOUNTER — OFFICE VISIT (OUTPATIENT)
Dept: PSYCHOLOGY | Facility: CLINIC | Age: 30
End: 2020-02-18
Attending: INTERNAL MEDICINE
Payer: COMMERCIAL

## 2020-02-18 DIAGNOSIS — F41.1 GAD (GENERALIZED ANXIETY DISORDER): Primary | ICD-10-CM

## 2020-02-18 PROCEDURE — 90834 PSYTX W PT 45 MINUTES: CPT | Performed by: MARRIAGE & FAMILY THERAPIST

## 2020-02-18 ASSESSMENT — ANXIETY QUESTIONNAIRES
6. BECOMING EASILY ANNOYED OR IRRITABLE: SEVERAL DAYS
IF YOU CHECKED OFF ANY PROBLEMS ON THIS QUESTIONNAIRE, HOW DIFFICULT HAVE THESE PROBLEMS MADE IT FOR YOU TO DO YOUR WORK, TAKE CARE OF THINGS AT HOME, OR GET ALONG WITH OTHER PEOPLE: VERY DIFFICULT
2. NOT BEING ABLE TO STOP OR CONTROL WORRYING: SEVERAL DAYS
GAD7 TOTAL SCORE: 7
5. BEING SO RESTLESS THAT IT IS HARD TO SIT STILL: SEVERAL DAYS
1. FEELING NERVOUS, ANXIOUS, OR ON EDGE: SEVERAL DAYS
7. FEELING AFRAID AS IF SOMETHING AWFUL MIGHT HAPPEN: SEVERAL DAYS
3. WORRYING TOO MUCH ABOUT DIFFERENT THINGS: SEVERAL DAYS

## 2020-02-18 ASSESSMENT — PATIENT HEALTH QUESTIONNAIRE - PHQ9: 5. POOR APPETITE OR OVEREATING: SEVERAL DAYS

## 2020-02-18 NOTE — PROGRESS NOTES
Progress Note    Patient Name: Miracle Robertson  Date: 2/18/2020         Service Type: Individual  Video Visit: No     Session Start Time: 10AM  Session End Time: 10:45AM     Session Length: 45    Session #: 2    Attendees: Client attended alone     Treatment Plan Last Reviewed: Next session  PHQ-9 / ODETTE-7 : Each session    DATA  Interactive Complexity: No  Crisis: No       Progress Since Last Session (Related to Symptoms / Goals / Homework):   Symptoms: Worsening boyfriend broke up with her    Homework: Partially completed      Episode of Care Goals: Minimal progress - ACTION (Actively working towards change); Intervened by reinforcing change plan / affirming steps taken     Current / Ongoing Stressors and Concerns:   Client and her SO have broken up and she doesn't feel like it was her decision. She still wants to work on the relationship. Therapist affirmed her view that he should be willing to work on things especially if they were . Their issues do not seem big enough to break up over. Talked about men being control adverse and concerned with sexual intimacy and making her happy. Client feels some of her own guilt for not treating him as well as she could, being tired and crabby and critical. Therapist encouraged her to learn the lessons, take time to grieve and lean on her agustin. She still has hope he may change his mind and they are being very amicable about things. She may store some of her belongings at his house and is planning to move in with a friend for 2 months. Needs to figure out what to do beyond this time frame. She also now has a puppy which has been a nice distraction for her. Her throat is feeling better but she said it could be the heartburn medication she is taking.      Treatment Objective(s) Addressed in This Session:   use cognitive strategies identified in therapy to challenge anxious thoughts  Grief and loss work.     Intervention:   CBT:  and grief therapy        ASSESSMENT: Current Emotional / Mental Status (status of significant symptoms):   Risk status (Self / Other harm or suicidal ideation)   Patient denies current fears or concerns for personal safety.   Patient denies current or recent suicidal ideation or behaviors.   Patientdenies current or recent homicidal ideation or behaviors.   Patient denies current or recent self injurious behavior or ideation.   Patient denies other safety concerns.   Patient reports there has been no change in risk factors since their last session.     Patientreports there has been no change in protective factors since their last session.     Recommended that patient call 911 or go to the local ED should there be a change in any of these risk factors.     Appearance:   Appropriate    Eye Contact:   Good    Psychomotor Behavior: Normal    Attitude:   Cooperative    Orientation:   All   Speech    Rate / Production: Normal     Volume:  Normal    Mood:    Sad    Affect:    Appropriate    Thought Content:  Clear    Thought Form:  Coherent  Logical    Insight:    Good      Medication Review:   No current psychiatric medications prescribed Doctor had put her on Paxil but she is now off; didn't like how it made her feel.     Medication Compliance:   NA     Changes in Health Issues:   None reported     Chemical Use Review:   Substance Use: Chemical use reviewed, no active concerns identified      Tobacco Use: No current tobacco use.      Diagnosis:  1. ODETTE (generalized anxiety disorder)        Collateral Reports Completed:   Not Applicable    PLAN: (Patient Tasks / Therapist Tasks / Other)  Homework: Be mindful of her grief process, remain in positive interactions with her SO and begin planning for future living situation.        Lily Alexandre                                                         ______________________________________________________________________

## 2020-02-19 ASSESSMENT — ANXIETY QUESTIONNAIRES: GAD7 TOTAL SCORE: 7

## 2020-03-02 ENCOUNTER — OFFICE VISIT (OUTPATIENT)
Dept: INTERNAL MEDICINE | Facility: CLINIC | Age: 30
End: 2020-03-02
Payer: COMMERCIAL

## 2020-03-02 VITALS
SYSTOLIC BLOOD PRESSURE: 100 MMHG | HEART RATE: 82 BPM | DIASTOLIC BLOOD PRESSURE: 72 MMHG | BODY MASS INDEX: 23.43 KG/M2 | OXYGEN SATURATION: 97 % | WEIGHT: 140.8 LBS | RESPIRATION RATE: 18 BRPM

## 2020-03-02 DIAGNOSIS — R09.A2 GLOBUS SENSATION: ICD-10-CM

## 2020-03-02 DIAGNOSIS — F41.1 GAD (GENERALIZED ANXIETY DISORDER): ICD-10-CM

## 2020-03-02 DIAGNOSIS — R09.89 THROAT TIGHTNESS: Primary | ICD-10-CM

## 2020-03-02 DIAGNOSIS — R49.0 HOARSENESS: ICD-10-CM

## 2020-03-02 PROCEDURE — 99214 OFFICE O/P EST MOD 30 MIN: CPT | Performed by: INTERNAL MEDICINE

## 2020-03-02 ASSESSMENT — ANXIETY QUESTIONNAIRES
7. FEELING AFRAID AS IF SOMETHING AWFUL MIGHT HAPPEN: MORE THAN HALF THE DAYS
3. WORRYING TOO MUCH ABOUT DIFFERENT THINGS: NOT AT ALL
5. BEING SO RESTLESS THAT IT IS HARD TO SIT STILL: MORE THAN HALF THE DAYS
6. BECOMING EASILY ANNOYED OR IRRITABLE: SEVERAL DAYS
1. FEELING NERVOUS, ANXIOUS, OR ON EDGE: SEVERAL DAYS
2. NOT BEING ABLE TO STOP OR CONTROL WORRYING: SEVERAL DAYS
GAD7 TOTAL SCORE: 8

## 2020-03-02 ASSESSMENT — PATIENT HEALTH QUESTIONNAIRE - PHQ9: 5. POOR APPETITE OR OVEREATING: SEVERAL DAYS

## 2020-03-02 NOTE — PATIENT INSTRUCTIONS
Consider seeing ENT for evaluation of vocal cord dysfunction/hoarseness.    ---    CONTINUE Omeprazole daily and consistently.     If diarrhea recurs, switch to Pepcid twice daily and consistently.     ---    Continue therapy if possible.     ---    Follow-up in 4-6 weeks (earlier as needed).     ---

## 2020-03-02 NOTE — PROGRESS NOTES
"  SUBJECTIVE:                                                      HPI: Miracle Robertson is a pleasant 29 year old female who presents for follow-up:    Patient was first seen in January for sore throat, throat tightness, globus sensation, shortness of breath, and anxiety. Acid reflux suspected and patient was started on high-dose PPI and H2 blocker. Patient Zoloft, which he is only been on briefly, was stopped because it was making her feel \"zoned out.\" Patient was also referred to ENT for further evaluation. Their evaluation was largely unremarkable, with findings suggestive of acid reflux.    At last visit, patient continued to have throat tightness and globus sensation. She was also feeling very emotional due to a recent break-up and struggles with a new puppy. She was started on Paxil for anxiety, but did not tolerate it due to fatigue and \"feeling out of it.\"    She continues to take her high-dose PPI and H2 blocker for suspected acid reflux. She reports that if she takes her high-dose PPI and H2 blocker she gets diarrhea, but if she only takes her high-dose PPI she does not.  She reports that her throat tightness and globus sensation have improved since January, but still persist to a mild degree. She describes her throat as \"feeling tired,\" dry, and \"heated\" sometimes. She still feels like something is stuck in it. Of note, patient reports that her symptoms are worse on days that she has been talking or exercising a lot. She is also noticed that she is hoarse at the end of these days.    Patient continues to report mild to moderate anxiety. She reports that seeing her therapist is helping. She would like to hold off on trying another anxiolytic at this time. Has Xanax available in case of severe anxiety - using rarely.    The medication, allergy, and problem lists have been reviewed and updated as appropriate.     OBJECTIVE:                                                      /72   Pulse 82   Resp 18  "  Wt 63.9 kg (140 lb 12.8 oz)   LMP 02/17/2020 (Exact Date)   SpO2 97%   BMI 23.43 kg/m    Constitutional: well-appearing  Psych: normal judgment and insight; normal mood and affect; recent and remote memory intact    ASSESSMENT/PLAN:                                                      (R68.89) Throat tightness  (primary encounter diagnosis)  (R09.89) Globus sensation  Comment: improved, but still present; diarrhea with high-dose PPI and H2 blocker combination.  Plan:    - CONTINUE omeprazole 40 mg daily and consistently for now.   - if diarrhea recurs, switch to Pepcid twice daily and consistently.   - follow-up in 4-6 weeks (earlier as needed).    (R49.0) Hoarseness  Comment: new symptom.  Plan: recommend that patient return to ENT for evaluation of hoarseness (possible vocal cord dysfunction); patient may benefit from speech therapy.    (F41.1) ODETTE (generalized anxiety disorder)  Comment: still active and untreated, other than therapy; did not tolerate Zoloft or Paxil.  Plan:    - CONTINUE therapy.   - follow-up in 4-6 weeks (earlier as needed).    The instructions on the AVS were discussed and explained to the patient. Patient expressed understanding of instructions.    (Chart documentation was completed, in part, with [employer] voice-recognition software. Even though reviewed, some grammatical, spelling, and word errors may remain.)    Margo Mane MD   [address] [address]  T: [phone], F: [phone]

## 2020-03-03 ASSESSMENT — ANXIETY QUESTIONNAIRES: GAD7 TOTAL SCORE: 8

## 2020-03-05 ENCOUNTER — OFFICE VISIT (OUTPATIENT)
Dept: PSYCHOLOGY | Facility: CLINIC | Age: 30
End: 2020-03-05
Payer: COMMERCIAL

## 2020-03-05 DIAGNOSIS — F41.1 GAD (GENERALIZED ANXIETY DISORDER): Primary | ICD-10-CM

## 2020-03-05 PROCEDURE — 90834 PSYTX W PT 45 MINUTES: CPT | Performed by: MARRIAGE & FAMILY THERAPIST

## 2020-03-05 NOTE — PROGRESS NOTES
Progress Note    Patient Name: Miracle Robertson  Date: 3/5/2020         Service Type: Individual  Video Visit: No     Session Start Time: 10AM  Session End Time: 10:45AM     Session Length: 45    Session #: 3    Attendees: Client attended alone     Treatment Plan Last Reviewed: Next session  PHQ-9 / ODETTE-7 : Each session    DATA  Interactive Complexity: No  Crisis: No       Progress Since Last Session (Related to Symptoms / Goals / Homework):   Symptoms: Worsening boyfriend broke up with her    Homework: Partially completed      Episode of Care Goals: Minimal progress - ACTION (Actively working towards change); Intervened by reinforcing change plan / affirming steps taken     Current / Ongoing Stressors and Concerns:   Client is emotional today talking about the troubles she's having caring for her puppy and affording a place of her own. She fears her dog is upsetting her friend she is living with and she is sad that she doesn't have the money for an apartment. 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 She is causing client significant stress. Talked about ways to manage as well as asking her parents for help either financially or with her dog. Client struggles to ask them for help and wants to be the good daughter. Helped her reframe this. Client is also losing hope that she and Zac will reconcile and she is sad about this. Validated her sadness and encouraged her to take the time she needs to grieve and realize it is far too soon to be feeling fine about these unexpected changes in her life.     Treatment Objective(s) Addressed in This Session:   use cognitive strategies identified in therapy to challenge anxious thoughts  Grief and loss work.     Intervention:   CBT: and grief therapy        ASSESSMENT: Current Emotional / Mental Status (status of significant symptoms):   Risk status  (Self / Other harm or suicidal ideation)   Patient denies current fears or concerns for personal safety.   Patient denies current or recent suicidal ideation or behaviors.   Patientdenies current or recent homicidal ideation or behaviors.   Patient denies current or recent self injurious behavior or ideation.   Patient denies other safety concerns.   Patient reports there has been no change in risk factors since their last session.     Patientreports there has been no change in protective factors since their last session.     Recommended that patient call 911 or go to the local ED should there be a change in any of these risk factors.     Appearance:   Appropriate    Eye Contact:   Good    Psychomotor Behavior: Normal    Attitude:   Cooperative    Orientation:   All   Speech    Rate / Production: Normal     Volume:  Normal    Mood:    Sad    Affect:    Appropriate    Thought Content:  Clear    Thought Form:  Coherent  Logical    Insight:    Good      Medication Review:   No current psychiatric medications prescribed      Medication Compliance:   NA     Changes in Health Issues:   None reported     Chemical Use Review:   Substance Use: Chemical use reviewed, no active concerns identified      Tobacco Use: No current tobacco use.      Diagnosis:  1. ODETTE (generalized anxiety disorder)        Collateral Reports Completed:   Not Applicable    PLAN: (Patient Tasks / Therapist Tasks / Other)  Homework: Boundaries with her difficult client. Ask parents for help as she feels comfortable.        Lily Alexandre                                                         ______________________________________________________________________                                                 Treatment Plan    Client's Name: Miracle Robertson  YOB: 1990    Date: 3/5/2020    DSM-V Diagnoses: 300.02 - Generalized Anxiety Disorder By History; V71.09 - No Diagnosis  Psychosocial / Contextual Factors: Financial, new puppy,  recent break up  WHODAS: 23    Referral / Collaboration:  Referral to another professional/service is not indicated at this time..    Anticipated number of session or this episode of care: 10      MeasurableTreatment Goal(s) related to diagnosis / functional impairment(s)  Goal 1: Client will lower GAD7 score to 3 or below.    I will know I've met my goal when I'm less stressed.      Objective #A (Client Action)    Client will use cognitive strategies identified in therapy to challenge anxious thoughts.  Status: New - Date: 3/5/2020     Intervention(s)  Therapist will use her gratitude journal and devotion time daily..    Objective #B  Client will be willing to ask more proactively for help..  Status: New - Date: 3/5/2020     Intervention(s)  Therapist will assign homework ask her parents for help..    Objective #C  Client will continue looking for a place to live..  Status: New - Date: 3/5/2020     Intervention(s)  Therapist will encourage her in the process..        Patient has reviewed and agreed to the above plan.      Lily Alexandre  March 5, 2020

## 2020-07-06 ENCOUNTER — MYC MEDICAL ADVICE (OUTPATIENT)
Dept: INTERNAL MEDICINE | Facility: CLINIC | Age: 30
End: 2020-07-06

## 2020-07-15 ENCOUNTER — NURSE TRIAGE (OUTPATIENT)
Dept: NURSING | Facility: CLINIC | Age: 30
End: 2020-07-15

## 2020-07-15 ENCOUNTER — VIRTUAL VISIT (OUTPATIENT)
Dept: FAMILY MEDICINE | Facility: OTHER | Age: 30
End: 2020-07-15
Payer: COMMERCIAL

## 2020-07-15 PROCEDURE — 99421 OL DIG E/M SVC 5-10 MIN: CPT | Performed by: PHYSICIAN ASSISTANT

## 2020-07-15 NOTE — TELEPHONE ENCOUNTER
Miracle has a sore throat and also has gerd and gets canker sores in mouth.  Went to [facility] with symptoms and was prescribed an antibiotic Cephalexin  in case it was strep throat and is going to get a covid test tomorrow.  Today Miracle is requesting to speak with her primary MD as she has questions on taking the medication and getting a covid test.  Will the medication interfere with the covid testing and will the medication make her system weak.  Miracle does not feel that she has strep throat and has some questions on her care.  Please phone Miracle.      Additional Information    Negative: Nursing judgment, per information in Reference    Negative: Information only call about a Well Adult (no illness or injury)    Negative: Nursing judgment or information in reference    Negative: Nursing judgment or information in reference    Negative: Nursing judgment or information in reference    Negative: Nursing judgment or information in reference    Negative: Nursing judgment or information in reference    Negative: Nursing judgment or information in reference    Negative: Nursing judgment or information in reference    Negative: Nursing judgment or information in reference    Negative: Nursing judgment or information in reference    Negative: Nursing judgment or information in reference    Negative: Nursing judgment or information in reference    Negative: Nursing judgment or information in reference    Negative: Nursing judgment or information in reference    Nursing judgment or information in reference    Protocols used: NO GUIDELINE [id number]

## 2020-07-15 NOTE — TELEPHONE ENCOUNTER
Recommend patient send a [employer] message with questions/concerns.    If patient would like me to formally address her symptoms, recommend E-visit or virtual visit.

## 2020-07-15 NOTE — PROGRESS NOTES
"Date: 07/15/2020 10:27:31  Clinician: Tamia Bazan  Clinician NPI: [id number]  Patient: Miracle Robertson  Patient : 1990  Patient Address: [address], [place] [address]  Patient Phone: [phone]  Visit Protocol: URI  Patient Summary:  Miracle is a 30 year old ( : 1990 ) female who initiated a Visit for cold, sinus infection, or influenza. When asked the question \"Please sign me up to receive news, health information and promotions from [employer].\", Miracle responded \"No\".    Miracle states her symptoms started 1-2 days ago.   Her symptoms consist of diarrhea, malaise, ear pain, a headache, and a sore throat.   Symptom details     Sore throat: Miracle reports having mild throat pain (1-3 on a 10 point pain scale), has exudate on her tonsils, and can swallow liquids. She is not sure if the lymph nodes in her neck are enlarged. A rash has not appeared on the skin since the sore throat started.     Headache: She states the headache is mild (1-3 on a 10 point pain scale).      Miracle denies having wheezing, nausea, teeth pain, ageusia, vomiting, rhinitis, chills, myalgias, anosmia, facial pain or pressure, fever, cough, and nasal congestion. She also denies having recent facial or sinus surgery in the past 60 days and taking antibiotic medication in the past month. She is not experiencing dyspnea.   Precipitating events  Miracle is not sure if she has been exposed to someone with strep throat.   Pertinent COVID-19 (Coronavirus) information  In the past 14 days, [employer] has not worked in a congregate living setting.   She does not work or volunteer as healthcare worker or a  and does not work or volunteer in a healthcare facility.   Miracle also has not lived in a congregate living setting in the past 14 days. She does not live with a healthcare worker.   Miracle has not had a close contact with a laboratory-confirmed COVID-19 patient within 14 days of symptom " onset.   Pertinent medical history  Miracle does not get yeast infections when she takes antibiotics.   Miracle does not need a return to work/school note.   Weight: 140 lbs   Miracle does not smoke or use smokeless tobacco.   She denies pregnancy and denies breastfeeding. She is currently menstruating.   Additional information as reported by the patient (free text): I have been taking Omeprazole for 6 months now because I have GERD. I am wondering if my throat hurts bad from canker sores from GERD. 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 I haven't seen this  since the middle of June, however, I've been around the same people she's been around. I don't know if I should be concerned, your insight would help.   Weight: 140 lbs    MEDICATIONS: omeprazole oral, Flonase Allergy Relief nasal, Apri oral, ALLERGIES: NKDA  Clinician Response:  Dear Miracle,   Your symptoms show that you may have coronavirus (COVID-19). This illness can cause fever, cough and trouble breathing. Many people get a mild case and get better on their own. Some people can get very sick.  What should I do?  We would like to test you for this virus.   1. Please call [phone] to schedule your visit. Explain that you were referred by [facility] to have a COVID-19 test. Be ready to share your [facility] visit ID number.  The following will serve as your written order for this COVID Test, ordered by me, for the indication of suspected COVID [Z20.828]: The test will be ordered in [employer], our electronic health record, after you are scheduled. It will show as ordered and authorized by Enmanuel Lim MD.  Order: COVID-19 (Coronavirus) PCR for SYMPTOMATIC testing from [facility].      2. When it's time for your COVID test:  Stay at least 6 feet away from others. (If someone will drive you to your test, stay in the backseat, as far away from the  as you can.)  "  Cover your mouth and nose with a mask, tissue or washcloth.  Go straight to the testing site. Don't make any stops on the way there or back.      3.Starting now: Stay home and away from others (self-isolate) until:   You've had no fever---and no medicine that reduces fever---for 3 full days (72 hours). And...   Your other symptoms have gotten better. For example, your cough or breathing has improved. And...   At least 10 days have passed since your symptoms started.       During this time, don't leave the house except for testing or medical care.   Stay in your own room, even for meals. Use your own bathroom if you can.   Stay away from others in your home. No hugging, kissing or shaking hands. No visitors.  Don't go to work, school or anywhere else.    Clean \"high touch\" surfaces often (doorknobs, counters, handles, etc.). Use a household cleaning spray or wipes. You'll find a full list of  on the EPA website: www.epa.gov/pesticide-registration/list-n-disinfectants-use-against-sars-cov-2.   Cover your mouth and nose with a mask, tissue or washcloth to avoid spreading germs.  Wash your hands and face often. Use soap and water.  Caregivers in these groups are at risk for severe illness due to COVID-19:  o People 65 years and older  o People who live in a nursing home or long-term care facility  o People with chronic disease (lung, heart, cancer, diabetes, kidney, liver, immunologic)  o People who have a weakened immune system, including those who:   Are in cancer treatment  Take medicine that weakens the immune system, such as corticosteroids  Had a bone marrow or organ transplant  Have an immune deficiency  Have poorly controlled HIV or AIDS  Are obese (body mass index of 40 or higher)  Smoke regularly   o Caregivers should wear gloves while washing dishes, handling laundry and cleaning bedrooms and bathrooms.  o Use caution when washing and drying laundry: Don't shake dirty laundry, and use the warmest " water setting that you can.  o For more tips, go to www.cdc.gov/coronavirus/2019-ncov/downloads/10Things.pdf.    4.Sign up for [facility]. We know it's scary to hear that you might have COVID-19. We want to track your symptoms to make sure you're okay over the next 2 weeks. Please look for an email from [employer]---this is a free, online program that we'll use to keep in touch. To sign up, follow the link in the email. Learn more at [email or web address]  How can I take care of myself?   Get lots of rest. Drink extra fluids (unless a doctor has told you not to).   Take Tylenol (acetaminophen) for fever or pain. If you have liver or kidney problems, ask your family doctor if it's okay to take Tylenol.   Adults can take either:    650 mg (two 325 mg pills) every 4 to 6 hours, or...   1,000 mg (two 500 mg pills) every 8 hours as needed.    Note: Don't take more than 3,000 mg in one day. Acetaminophen is found in many medicines (both prescribed and over-the-counter medicines). Read all labels to be sure you don't take too much.   For children, check the Tylenol bottle for the right dose. The dose is based on the child's age or weight.    If you have other health problems (like cancer, heart failure, an organ transplant or severe kidney disease): Call your specialty clinic if you don't feel better in the next 2 days.       Know when to call 911. Emergency warning signs include:    Trouble breathing or shortness of breath Pain or pressure in the chest that doesn't go away Feeling confused like you haven't felt before, or not being able to wake up Bluish-colored lips or face.  Where can I get more information?    [facility] -- About COVID-19: [email or web address]   CDC -- What to Do If You're Sick: www.cdc.gov/coronavirus/2019-ncov/about/steps-when-sick.html   CDC -- Ending Home Isolation: www.cdc.gov/coronavirus/2019-ncov/hcp/disposition-in-home-patients.html   [facility] -- Caring for Someone:  www.cdc.gov/coronavirus/2019-ncov/if-you-are-sick/care-for-someone.html   [facility] -- Interim Guidance for Hospital Discharge to Home: www.health.[facility].[place]/diseases/coronavirus/hcp/hospdischarge.pdf   [facility] clinical trials (COVID-19 research studies): clinicalaffairs.[facility]-clinical-trials    Below are the COVID-19 hotlines at the [place] Department of Health ([facility]). Interpreters are available.    For health questions: Call 389-497-6141 or 1-782.687.3240 (7 a.m. to 7 p.m.) For questions about schools and childcare: Call 378-922-0115 or 1-580.455.3030 (7 a.m. to 7 p.m.)    Diagnosis: Pain in throat  Diagnosis ICD: R07.0  Additional Clinician Notes: The symptoms you described could be strep throat or COVID-19. I will prescribed an antibiotic to treat presumptively for strep throat but you can also call the number to schedule a COVID test.  Prescription: cefdinir 300 mg oral capsule 20 capsule, 10 days supply. Take 1 capsule by mouth every 12 hours for 10 days. Refills: 0, Refill as needed: no, Allow substitutions: yes  Pharmacy: [id number] - [phone] - [address], [place] [address]

## 2020-07-16 DIAGNOSIS — Z20.822 SUSPECTED 2019 NOVEL CORONAVIRUS INFECTION: Primary | ICD-10-CM

## 2020-07-16 LAB
LABORATORY COMMENT REPORT: NORMAL
SARS-COV-2 RNA SPEC QL NAA+PROBE: NEGATIVE
SARS-COV-2 RNA SPEC QL NAA+PROBE: NORMAL
SPECIMEN SOURCE: NORMAL
SPECIMEN SOURCE: NORMAL

## 2020-07-16 NOTE — LETTER
July 17, 2020        Miracle Robertson  [id number]  [place] [address]    This letter provides a written record that you were tested for COVID-19 on 07/16/20.       Your result was negative. This means that we didn t find the virus that causes COVID-19 in your sample. A test may show negative when you do actually have the virus. This can happen when the virus is in the early stages of infection, before you feel illness symptoms.    If you have symptoms   Stay home and away from others (self-isolate) until you meet ALL of the guidelines below:    You ve had no fever--and no medicine that reduces fever--for 3 full days (72 hours). And      Your other symptoms have gotten better. For example, your cough or breathing has improved. And     At least 10 days have passed since your symptoms started.    During this time:    Stay home. Don t go to work, school or anywhere else.     Stay in your own room, including for meals. Use your own bathroom if you can.    Stay away from others in your home. No hugging, kissing or shaking hands. No visitors.    Clean  high touch  surfaces often (doorknobs, counters, handles, etc.). Use a household cleaning spray or wipes. You can find a full list on the EPA website at www.epa.gov/pesticide-registration/list-n-disinfectants-use-against-sars-cov-2.    Cover your mouth and nose with a mask, tissue or washcloth to avoid spreading germs.    Wash your hands and face often with soap and water.    Going back to work  Check with your employer for any guidelines to follow for going back to work.    Employers: This document serves as formal notice that your employee tested negative for COVID-19, as of the testing date shown above.

## 2020-07-16 NOTE — TELEPHONE ENCOUNTER
[email or web address] visit complete yesterday. PCR appointment pending. See alternate questions.     Marielena DUTTAN, RN, PHN

## 2020-09-15 ENCOUNTER — MYC MEDICAL ADVICE (OUTPATIENT)
Dept: INTERNAL MEDICINE | Facility: CLINIC | Age: 30
End: 2020-09-15

## 2020-09-15 NOTE — TELEPHONE ENCOUNTER
PCP please see MyChart message 1 of 2 regarding anxiety; d/t limited openings with primary this week, are you ok with discussing via E-visit?    Marielena DUMONT, RN, PHN

## 2020-09-16 ENCOUNTER — E-VISIT (OUTPATIENT)
Dept: INTERNAL MEDICINE | Facility: CLINIC | Age: 30
End: 2020-09-16
Payer: COMMERCIAL

## 2020-09-16 DIAGNOSIS — F33.0 MILD EPISODE OF RECURRENT MAJOR DEPRESSIVE DISORDER (H): ICD-10-CM

## 2020-09-16 DIAGNOSIS — F41.1 GAD (GENERALIZED ANXIETY DISORDER): Primary | ICD-10-CM

## 2020-09-16 PROCEDURE — 99421 OL DIG E/M SVC 5-10 MIN: CPT | Performed by: INTERNAL MEDICINE

## 2020-09-16 RX ORDER — ESCITALOPRAM OXALATE 10 MG/1
10 TABLET ORAL DAILY
Qty: 90 TABLET | Refills: 0 | Status: SHIPPED | OUTPATIENT
Start: 2020-09-16 | End: 2021-02-14

## 2020-09-16 ASSESSMENT — ANXIETY QUESTIONNAIRES
5. BEING SO RESTLESS THAT IT IS HARD TO SIT STILL: SEVERAL DAYS
2. NOT BEING ABLE TO STOP OR CONTROL WORRYING: MORE THAN HALF THE DAYS
GAD7 TOTAL SCORE: 9
7. FEELING AFRAID AS IF SOMETHING AWFUL MIGHT HAPPEN: MORE THAN HALF THE DAYS
1. FEELING NERVOUS, ANXIOUS, OR ON EDGE: MORE THAN HALF THE DAYS
7. FEELING AFRAID AS IF SOMETHING AWFUL MIGHT HAPPEN: MORE THAN HALF THE DAYS
GAD7 TOTAL SCORE: 9
GAD7 TOTAL SCORE: 9
4. TROUBLE RELAXING: SEVERAL DAYS
3. WORRYING TOO MUCH ABOUT DIFFERENT THINGS: SEVERAL DAYS
6. BECOMING EASILY ANNOYED OR IRRITABLE: NOT AT ALL

## 2020-09-16 ASSESSMENT — PATIENT HEALTH QUESTIONNAIRE - PHQ9
10. IF YOU CHECKED OFF ANY PROBLEMS, HOW DIFFICULT HAVE THESE PROBLEMS MADE IT FOR YOU TO DO YOUR WORK, TAKE CARE OF THINGS AT HOME, OR GET ALONG WITH OTHER PEOPLE: SOMEWHAT DIFFICULT
SUM OF ALL RESPONSES TO PHQ QUESTIONS 1-9: 7
SUM OF ALL RESPONSES TO PHQ QUESTIONS 1-9: 7

## 2020-09-17 ASSESSMENT — PATIENT HEALTH QUESTIONNAIRE - PHQ9: SUM OF ALL RESPONSES TO PHQ QUESTIONS 1-9: 7

## 2020-09-17 ASSESSMENT — ANXIETY QUESTIONNAIRES: GAD7 TOTAL SCORE: 9

## 2020-12-27 ENCOUNTER — HEALTH MAINTENANCE LETTER (OUTPATIENT)
Age: 30
End: 2020-12-27

## 2021-02-14 ENCOUNTER — MYC REFILL (OUTPATIENT)
Dept: INTERNAL MEDICINE | Facility: CLINIC | Age: 31
End: 2021-02-14

## 2021-02-14 DIAGNOSIS — F33.0 MILD EPISODE OF RECURRENT MAJOR DEPRESSIVE DISORDER (H): ICD-10-CM

## 2021-02-14 RX ORDER — ESCITALOPRAM OXALATE 10 MG/1
10 TABLET ORAL DAILY
Qty: 90 TABLET | Refills: 0 | Status: SHIPPED | OUTPATIENT
Start: 2021-02-14 | End: 2021-07-28

## 2021-04-24 ENCOUNTER — HEALTH MAINTENANCE LETTER (OUTPATIENT)
Age: 31
End: 2021-04-24

## 2021-05-22 ENCOUNTER — E-VISIT (OUTPATIENT)
Dept: INTERNAL MEDICINE | Facility: CLINIC | Age: 31
End: 2021-05-22
Payer: COMMERCIAL

## 2021-05-22 DIAGNOSIS — Z53.9 ERRONEOUS ENCOUNTER--DISREGARD: Primary | ICD-10-CM

## 2021-05-22 PROCEDURE — 99207 PR NO BILLABLE SERVICE THIS VISIT: CPT | Performed by: INTERNAL MEDICINE

## 2021-06-29 ENCOUNTER — OFFICE VISIT (OUTPATIENT)
Dept: FAMILY MEDICINE | Facility: CLINIC | Age: 31
End: 2021-06-29
Payer: COMMERCIAL

## 2021-06-29 VITALS
BODY MASS INDEX: 23.99 KG/M2 | HEIGHT: 65 IN | WEIGHT: 144 LBS | SYSTOLIC BLOOD PRESSURE: 108 MMHG | DIASTOLIC BLOOD PRESSURE: 66 MMHG | HEART RATE: 75 BPM | OXYGEN SATURATION: 99 % | TEMPERATURE: 98.3 F

## 2021-06-29 DIAGNOSIS — D17.22 LIPOMA OF LEFT UPPER EXTREMITY: Primary | ICD-10-CM

## 2021-06-29 PROCEDURE — 99212 OFFICE O/P EST SF 10 MIN: CPT | Performed by: PHYSICIAN ASSISTANT

## 2021-06-29 RX ORDER — LORATADINE 10 MG/1
10 TABLET ORAL DAILY
COMMUNITY
End: 2021-07-28

## 2021-06-29 ASSESSMENT — ANXIETY QUESTIONNAIRES
GAD7 TOTAL SCORE: 5
2. NOT BEING ABLE TO STOP OR CONTROL WORRYING: SEVERAL DAYS
GAD7 TOTAL SCORE: 5
4. TROUBLE RELAXING: NOT AT ALL
6. BECOMING EASILY ANNOYED OR IRRITABLE: SEVERAL DAYS
3. WORRYING TOO MUCH ABOUT DIFFERENT THINGS: SEVERAL DAYS
GAD7 TOTAL SCORE: 5
7. FEELING AFRAID AS IF SOMETHING AWFUL MIGHT HAPPEN: SEVERAL DAYS
7. FEELING AFRAID AS IF SOMETHING AWFUL MIGHT HAPPEN: SEVERAL DAYS
1. FEELING NERVOUS, ANXIOUS, OR ON EDGE: SEVERAL DAYS
5. BEING SO RESTLESS THAT IT IS HARD TO SIT STILL: NOT AT ALL

## 2021-06-29 ASSESSMENT — PATIENT HEALTH QUESTIONNAIRE - PHQ9
10. IF YOU CHECKED OFF ANY PROBLEMS, HOW DIFFICULT HAVE THESE PROBLEMS MADE IT FOR YOU TO DO YOUR WORK, TAKE CARE OF THINGS AT HOME, OR GET ALONG WITH OTHER PEOPLE: NOT DIFFICULT AT ALL
SUM OF ALL RESPONSES TO PHQ QUESTIONS 1-9: 5
SUM OF ALL RESPONSES TO PHQ QUESTIONS 1-9: 5

## 2021-06-29 ASSESSMENT — MIFFLIN-ST. JEOR: SCORE: 1379.06

## 2021-06-29 NOTE — PROGRESS NOTES
"    Assessment & Plan   Problem List Items Addressed This Visit     None      Visit Diagnoses     Lipoma of left upper extremity    -  Primary         Mass feels most consistent with a small fatty tumor or lipoma.  These are benign.  If the lesion is changing or becomes bothersome, patient is encouraged to follow up for a recheck and we would get imaging (likely US) to evaluate.  At this time, it is not concerning.                  Return in about 2 weeks (around 7/13/2021) for Preventive Care Visit with Pap.    [facility] PR[facility] Rausch is a 31 year old who presents for the following health issues     HPI     Concern - Lump aka (Mass)  Onset: x 3 weeks ago   Description: left arm -feels hard to the  touch    Intensity: mild  Progression of Symptoms:  same  Accompanying Signs & Symptoms:   Previous history of similar problem:   Precipitating factors:        Worsened by:   Alleviating factors:        Improved by:   Therapies tried and outcome: None    COVID vaccine in that arm on 4/7/21        Review of Systems         Objective    /66   Pulse 75   Temp 98.3  F (36.8  C) (Tympanic)   Ht 1.651 m (5' 5\")   Wt 65.3 kg (144 lb)   LMP 06/07/2021   SpO2 99%   BMI 23.96 kg/m    Body mass index is 23.96 kg/m .  Physical Exam  Constitutional:       General: She is not in acute distress.     Appearance: She is well-developed. She is not diaphoretic.   HENT:      Head: Normocephalic.      Right Ear: External ear normal.      Left Ear: External ear normal.      Nose: Nose normal.   Eyes:      Conjunctiva/sclera: Conjunctivae normal.   Neck:      Musculoskeletal: Normal range of motion.   Pulmonary:      Effort: Pulmonary effort is normal.   Musculoskeletal:        Arms:       Comments: 12 mm x 10 mm soft, round, uniform mass superficial to the anterior deltoid, no overlying skin change, non-tender   Neurological:      Mental Status: She is alert and " oriented to person, place, and time.   Psychiatric:         Judgment: Judgment normal.                        Answers for HPI/ROS submitted by the patient on 6/29/2021   If you checked off any problems, how difficult have these problems made it for you to do your work, take care of things at home, or get along with other people?: Not difficult at all  PHQ9 TOTAL SCORE: 5  ODETTE 7 TOTAL SCORE: 5

## 2021-06-29 NOTE — PATIENT INSTRUCTIONS
Patient Education     Understanding a Lipoma     A lipoma is a lump under the skin that s made of fat. It s not cancer (benign). It feels soft like rubber when you press it, and in most cases it doesn t hurt. Some people have more than one. A lipoma grows slowly over time and doesn t cause many problems. Lipomas occur most often in adults from ages 40 to 60. They are more common in men.   How to say it  Ly-POH-abhijeet  What causes a lipoma?  Experts don't know yet what causes lipomas. They are still learning more. They may be partly caused by a problem in a gene. They can run in families. Familial multiple lipomatosis is when 2 or more family members have many lipomas.  Symptoms of a lipoma  The main symptom of a lipoma is a soft lump under the skin that doesn t hurt unless it is pressing on a nerve. It may be small, around 1/4 inch across. Or it may be larger, up to 4 inches across or more.  There are different kinds of lipomas. The most common kind occurs under the skin of the shoulders, chest, back, belly, or under the arms. In some cases, a lipoma can occur on the legs. In rare cases, one may occur deeper in the body or in a muscle.  Treatment for a lipoma  In most cases, a lipoma doesn t need treatment. Your healthcare provider may look at it during regular checkups to see if it changes.  But if the lipoma is painful or you want it removed for cosmetic reasons, it can be removed with surgery. The surgery is called excision. The lipoma will most likely not grow back after surgery. During surgery, the area around the lipoma is numbed. If you have a deep lipoma, you may need medicine to numb a larger area (regional anesthesia). Or you may need medicine to put you to sleep during the procedure (general anesthesia). Then the doctor makes a cut over the area of the lipoma. He or she removes the lump of fat. The cut is then closed with stitches.  Possible complications of a lipoma  A large lipoma inside the body can  press on organs, nerves, or other tissues and cause problems. For example, it can cause problems with breathing or digestion.  Living with a lipoma  Your healthcare provider may look at the lipoma during regular checkups to see if it changes or is causing problems.  When to call your healthcare provider  Call your healthcare provider right away if you have any of these:    Lipoma that grows quickly, causes pain, or feels hard    New lipomas  [employer] last reviewed this educational content on 11/1/2018 2000-2021 [employer]. All rights reserved. This information is not intended as a substitute for professional medical care. Always follow your healthcare professional's instructions.

## 2021-06-30 ASSESSMENT — ANXIETY QUESTIONNAIRES: GAD7 TOTAL SCORE: 5

## 2021-06-30 ASSESSMENT — PATIENT HEALTH QUESTIONNAIRE - PHQ9: SUM OF ALL RESPONSES TO PHQ QUESTIONS 1-9: 5

## 2021-07-28 ENCOUNTER — OFFICE VISIT (OUTPATIENT)
Dept: INTERNAL MEDICINE | Facility: CLINIC | Age: 31
End: 2021-07-28
Payer: COMMERCIAL

## 2021-07-28 VITALS
WEIGHT: 146 LBS | HEART RATE: 72 BPM | BODY MASS INDEX: 24.3 KG/M2 | SYSTOLIC BLOOD PRESSURE: 92 MMHG | TEMPERATURE: 97.6 F | RESPIRATION RATE: 16 BRPM | DIASTOLIC BLOOD PRESSURE: 66 MMHG | OXYGEN SATURATION: 98 %

## 2021-07-28 DIAGNOSIS — Z82.49 FAMILY HISTORY OF DVT: ICD-10-CM

## 2021-07-28 DIAGNOSIS — R49.8 VOICE FATIGUE: ICD-10-CM

## 2021-07-28 DIAGNOSIS — Z13.1 SCREENING FOR DIABETES MELLITUS: ICD-10-CM

## 2021-07-28 DIAGNOSIS — F41.1 GAD (GENERALIZED ANXIETY DISORDER): ICD-10-CM

## 2021-07-28 DIAGNOSIS — K21.9 GASTROESOPHAGEAL REFLUX DISEASE WITHOUT ESOPHAGITIS: ICD-10-CM

## 2021-07-28 DIAGNOSIS — F33.42 RECURRENT MAJOR DEPRESSIVE DISORDER, IN FULL REMISSION (H): ICD-10-CM

## 2021-07-28 DIAGNOSIS — Z30.41 ENCOUNTER FOR SURVEILLANCE OF CONTRACEPTIVE PILLS: ICD-10-CM

## 2021-07-28 DIAGNOSIS — E55.9 VITAMIN D DEFICIENCY: ICD-10-CM

## 2021-07-28 DIAGNOSIS — Z12.4 SCREENING FOR CERVICAL CANCER: ICD-10-CM

## 2021-07-28 DIAGNOSIS — Z00.00 ROUTINE HISTORY AND PHYSICAL EXAMINATION OF ADULT: Primary | ICD-10-CM

## 2021-07-28 DIAGNOSIS — Z13.220 SCREENING FOR CHOLESTEROL LEVEL: ICD-10-CM

## 2021-07-28 DIAGNOSIS — Z13.29 SCREENING FOR THYROID DISORDER: ICD-10-CM

## 2021-07-28 DIAGNOSIS — Z11.3 SCREEN FOR STD (SEXUALLY TRANSMITTED DISEASE): ICD-10-CM

## 2021-07-28 DIAGNOSIS — Z13.0 SCREENING FOR BLOOD DISEASE: ICD-10-CM

## 2021-07-28 LAB
ALBUMIN SERPL-MCNC: 3.3 G/DL (ref 3.4–5)
ALP SERPL-CCNC: 53 U/L (ref 40–150)
ALT SERPL W P-5'-P-CCNC: 21 U/L (ref 0–50)
ANION GAP SERPL CALCULATED.3IONS-SCNC: 5 MMOL/L (ref 3–14)
AST SERPL W P-5'-P-CCNC: 21 U/L (ref 0–45)
BILIRUB SERPL-MCNC: 0.2 MG/DL (ref 0.2–1.3)
BUN SERPL-MCNC: 15 MG/DL (ref 7–30)
CALCIUM SERPL-MCNC: 8.4 MG/DL (ref 8.5–10.1)
CHLORIDE BLD-SCNC: 107 MMOL/L (ref 94–109)
CHOLEST SERPL-MCNC: 170 MG/DL
CO2 SERPL-SCNC: 25 MMOL/L (ref 20–32)
CREAT SERPL-MCNC: 1.08 MG/DL (ref 0.52–1.04)
ERYTHROCYTE [DISTWIDTH] IN BLOOD BY AUTOMATED COUNT: 12.6 % (ref 10–15)
FASTING STATUS PATIENT QL REPORTED: NO
GFR SERPL CREATININE-BSD FRML MDRD: 70 ML/MIN/1.73M2
GLUCOSE BLD-MCNC: 91 MG/DL (ref 70–99)
HCT VFR BLD AUTO: 40.2 % (ref 35–47)
HDLC SERPL-MCNC: 73 MG/DL
HGB BLD-MCNC: 13.5 G/DL (ref 11.7–15.7)
LDLC SERPL CALC-MCNC: 75 MG/DL
MCH RBC QN AUTO: 29.3 PG (ref 26.5–33)
MCHC RBC AUTO-ENTMCNC: 33.6 G/DL (ref 31.5–36.5)
MCV RBC AUTO: 87 FL (ref 78–100)
NONHDLC SERPL-MCNC: 97 MG/DL
PLATELET # BLD AUTO: 358 10E3/UL (ref 150–450)
POTASSIUM BLD-SCNC: 4.2 MMOL/L (ref 3.4–5.3)
PROT SERPL-MCNC: 7 G/DL (ref 6.8–8.8)
RBC # BLD AUTO: 4.61 10E6/UL (ref 3.8–5.2)
SODIUM SERPL-SCNC: 137 MMOL/L (ref 133–144)
TRIGL SERPL-MCNC: 112 MG/DL
TSH SERPL DL<=0.005 MIU/L-ACNC: 0.81 MU/L (ref 0.4–4)
WBC # BLD AUTO: 5.1 10E3/UL (ref 4–11)

## 2021-07-28 PROCEDURE — 36415 COLL VENOUS BLD VENIPUNCTURE: CPT | Performed by: INTERNAL MEDICINE

## 2021-07-28 PROCEDURE — 87624 HPV HI-RISK TYP POOLED RSLT: CPT | Performed by: INTERNAL MEDICINE

## 2021-07-28 PROCEDURE — 87340 HEPATITIS B SURFACE AG IA: CPT | Performed by: INTERNAL MEDICINE

## 2021-07-28 PROCEDURE — 80061 LIPID PANEL: CPT | Performed by: INTERNAL MEDICINE

## 2021-07-28 PROCEDURE — G0145 SCR C/V CYTO,THINLAYER,RESCR: HCPCS | Performed by: INTERNAL MEDICINE

## 2021-07-28 PROCEDURE — 86780 TREPONEMA PALLIDUM: CPT | Performed by: INTERNAL MEDICINE

## 2021-07-28 PROCEDURE — 85027 COMPLETE CBC AUTOMATED: CPT | Performed by: INTERNAL MEDICINE

## 2021-07-28 PROCEDURE — 87491 CHLMYD TRACH DNA AMP PROBE: CPT | Performed by: INTERNAL MEDICINE

## 2021-07-28 PROCEDURE — 87591 N.GONORRHOEAE DNA AMP PROB: CPT | Performed by: INTERNAL MEDICINE

## 2021-07-28 PROCEDURE — 86803 HEPATITIS C AB TEST: CPT | Performed by: INTERNAL MEDICINE

## 2021-07-28 PROCEDURE — 87389 HIV-1 AG W/HIV-1&-2 AB AG IA: CPT | Performed by: INTERNAL MEDICINE

## 2021-07-28 PROCEDURE — 82306 VITAMIN D 25 HYDROXY: CPT | Performed by: INTERNAL MEDICINE

## 2021-07-28 PROCEDURE — 84443 ASSAY THYROID STIM HORMONE: CPT | Performed by: INTERNAL MEDICINE

## 2021-07-28 PROCEDURE — 99395 PREV VISIT EST AGE 18-39: CPT | Performed by: INTERNAL MEDICINE

## 2021-07-28 PROCEDURE — 80053 COMPREHEN METABOLIC PANEL: CPT | Performed by: INTERNAL MEDICINE

## 2021-07-28 RX ORDER — ESCITALOPRAM OXALATE 5 MG/1
5 TABLET ORAL DAILY
Qty: 90 TABLET | Refills: 3 | Status: SHIPPED | OUTPATIENT
Start: 2021-07-28 | End: 2022-03-16

## 2021-07-28 RX ORDER — FAMOTIDINE 40 MG/1
40 TABLET, FILM COATED ORAL 2 TIMES DAILY PRN
Qty: 60 TABLET | Refills: 11 | Status: SHIPPED | OUTPATIENT
Start: 2021-07-28 | End: 2022-01-03

## 2021-07-28 RX ORDER — CETIRIZINE HYDROCHLORIDE 10 MG/1
10 TABLET ORAL DAILY
COMMUNITY
End: 2022-01-03

## 2021-07-28 RX ORDER — DESOGESTREL AND ETHINYL ESTRADIOL 0.15-0.03
1 KIT ORAL DAILY
Qty: 84 TABLET | Refills: 3 | Status: SHIPPED | OUTPATIENT
Start: 2021-07-28 | End: 2022-03-16

## 2021-07-28 NOTE — PATIENT INSTRUCTIONS
Pap smear results take ~1 week to come back.    ---    Labs today.    ---    Refills of birth control and Lexapro (5mg) sent in.    May use Pepcid as needed for acute acid reflux symptoms.    ---    ENT referral placed for voice fatigue - please call to schedule.

## 2021-07-28 NOTE — PROGRESS NOTES
ASSESSMENT/PLAN                                                       (Z00.00) Routine history and physical examination of adult  (primary encounter diagnosis)  Comment: PMH, PSH, FH, SH, medications, allergies, immunizations, and preventative health measures reviewed and updated as appropriate.  Plan: see below for plans.      (Z12.4) Screening for cervical cancer  Plan: pap obtained today.     (Z11.3) Screen for STD (sexually transmitted disease)  Comment: asymptomatic; no known exposures.  Plan: GC/C swab today; serum studies today.    (Z13.220) Screening for cholesterol level  (Z13.1) Screening for diabetes mellitus  (Z13.0) Screening for blood disease  (Z13.29) Screening for thyroid disorder  (E55.9) Vitamin D deficiency  Plan: labs today.    (R49.8) Voice fatigue  Plan: referred to ENT for further evaluation - patient to schedule; patient would likely benefit from vocal therapy.    (K21.9) Gastroesophageal reflux disease without esophagitis  Comment: poorly-controlled without medication.  Plan: TRIAL of Pepcid as needed; if using Pepcid regularly, would likely benefit from high-dose PPI.    (Z82.49) Family history of DVT  Comment: mother and brother recently diagnosed with DVTs and started on AC.  Plan: if possible, mother and/or brother should undergo hypercoagulable testing, which will help steer patients evaluation; patient to keep me posted re: their work-up(s).     (Z30.41) Encounter for surveillance of contraceptive pills  Plan: refills of OCP provided.    (F33.42) Recurrent major depressive disorder, in full remission (H)  (F41.1) ODETTE (generalized anxiety disorder)  Comment: well-controlled on current regimen.    Plan: continue present management; refills provided.     Margo Mane MD   [facility] [employer]  [address] [place] [id number]  T: [phone], F: [phone]    SUBJECTIVE                                                      Miracle Robertson is a very pleasant 31 year old  female who presents for a physical.    Patient reports voice fatigue at the end of the week.    Patient also reports intermittent acid reflux symptoms.   ROS:  Constitutional: no unintentional weight loss or gain reported; no fevers, chills, or sweats reported  Cardiovascular: no chest pain, palpitations, or edema reported  Respiratory: no cough, wheezing, shortness of breath, or dyspnea on exertion reported  Gastrointestinal: no nausea, vomiting, constipation, diarrhea, or abdominal pain reported  Genitourinary: no urinary frequency, urgency, dysuria, or hematuria reported  Integumentary: no rash or pruritus reported  Musculoskeletal: no back pain, muscle pain, joint pain, or joint swelling reported  Neurologic: no focal weakness, numbness, or tingling reported  Hematologic: no easy bruising or bleeding reported  Endocrine: no heat or cold intolerance reported; no polyuria or polydipsia reported  Psychiatric: see PMH below    Past Medical History:   Diagnosis Date     ODETTE (generalized anxiety disorder)      MDD (major depressive disorder)      Past Surgical History:   Procedure Laterality Date     NO HISTORY OF SURGERY       Family History   Problem Relation Age of Onset     Deep Vein Thrombosis (DVT) Mother      Deep Vein Thrombosis (DVT) Brother      Breast Cancer Maternal Grandmother      Diabetes Type 2  Paternal Grandfather      Prostate Cancer Paternal Grandfather      Cerebrovascular Disease No family hx of      Myocardial Infarction No family hx of      Coronary Artery Disease Early Onset No family hx of      Colon Cancer No family hx of      Ovarian Cancer No family hx of      Social History     Occupational History     Occupation: [employer]   Tobacco Use     Smoking status: Never Smoker     Smokeless tobacco: Never Used   Substance and Sexual Activity     Alcohol use: Yes     Alcohol/week: 1.0 standard drinks     Drug use: No     Sexual activity: Yes     Partners: Male     Birth  control/protection: Pill   Social History Narrative    Dating.    Exercising daily.      No Known Allergies     Current Outpatient Medications   Medication Sig     cetirizine (ZYRTEC) 10 MG tablet Take 10 mg by mouth daily     desogestrel-ethinyl estradiol (ISIBLOOM) 0.15-30 MG-MCG tablet Take 1 tablet by mouth daily     escitalopram (LEXAPRO) 5 MG tablet Take 1 tablet (5 mg) by mouth daily     famotidine (PEPCID) 40 MG tablet Take 1 tablet (40 mg) by mouth 2 times daily as needed for heartburn     Immunization History   Administered Date(s) Administered     COVID-19,PF,Norma 04/07/2021     DTAP (<7y) 11/19/1993, 04/15/1997     FLU 6-35 months 01/30/2017     Flu, Unspecified 01/30/2017     HPV Quadrivalent 09/07/2007, 11/08/2007, 03/17/2008     Hep B, Peds or Adolescent 07/17/1997, 08/20/1997, 03/30/1998     Historic Hib Hib-titer 1990, 1990, 12/17/1993     Historical DTP/aP 1990, 1990, 1992     Hpv, Unspecified  09/07/2007, 11/08/2007, 03/17/2008     Influenza (H1N1) 01/19/2010     Influenza (IIV3) PF 10/23/2015     Influenza Vaccine IM > 6 months Valent IIV4 02/20/2018, 01/20/2020     MMR 08/06/1993, 04/15/1997     Meningococcal (Menactra ) 01/19/2010     Meningococcal (Menveo ) 01/19/2010     Meningococcal,unspecified 01/19/2010     OPV, unspecified 1992, 1992, 11/19/1993     Poliovirus, inactivated (IPV) 04/15/1997     TDAP Vaccine (Adacel) 06/15/2007, 03/21/2019     Tdap (Adult) Unspecified Formulation 02/20/2018     Varicella 06/16/1994     PREVENTATIVE HEALTH                                                      BMI: within normal limits   Blood pressure: within normal limits   Breast CA screening: not medically indicated at this time   Cervical CA screening: DUE  Colon CA screening: not medically indicated at this time   Lung CA screening: n/a   Dexa: not medically indicated at this time   Screening cholesterol: DUE  Screening diabetes: DUE  STD testing: STD testing  offered and accepted by patient - asymptomatic, no known exposures  Alcohol misuse screening: alcohol use reviewed - no intervention indicated at this time  Immunizations: reviewed; up to date     OBJECTIVE                                                      BP 92/66 (BP Location: Left arm, Patient Position: Chair, Cuff Size: Adult Regular)   Pulse 72   Temp 97.6  F (36.4  C) (Temporal)   Resp 16   Wt 66.2 kg (146 lb)   SpO2 98%   BMI 24.30 kg/m    Constitutional: well-appearing  Head, Ears, and Eyes: normocephalic; normal external auditory canal and pinna; tympanic membranes visualized and normal; normal lids and conjunctivae  Neck: supple, symmetric, no thyromegaly or lymphadenopathy  Respiratory: normal respiratory effort; clear to auscultation bilaterally  Cardiovascular: regular rate and rhythm; no edema  Gastrointestinal: soft, non-tender, and non-distended; no organomegaly or masses  Genitourinary: external genitalia, urethral meatus, and vagina normal; cervix visualized and normal in appearance  Musculoskeletal: normal gait and station  Psych: normal judgment and insight; normal mood and affect; recent and remote memory intact    ---  (Note was completed, in part, with [employer] voice-recognition software. Documentation was reviewed, but some grammatical, spelling, and word errors may remain.)

## 2021-07-29 LAB
C TRACH DNA SPEC QL NAA+PROBE: NEGATIVE
DEPRECATED CALCIDIOL+CALCIFEROL SERPL-MC: 51 UG/L (ref 20–75)
HBV SURFACE AG SERPL QL IA: NONREACTIVE
HCV AB SERPL QL IA: NONREACTIVE
HIV 1+2 AB+HIV1 P24 AG SERPL QL IA: NONREACTIVE
N GONORRHOEA DNA SPEC QL NAA+PROBE: NEGATIVE
T PALLIDUM AB SER QL: NONREACTIVE

## 2021-08-03 LAB
BKR LAB AP GYN ADEQUACY: NORMAL
BKR LAB AP GYN INTERPRETATION: NORMAL
BKR LAB AP HPV REFLEX: NORMAL
BKR LAB AP PREVIOUS ABNORMAL: NORMAL
PATH REPORT.COMMENTS IMP SPEC: NORMAL
PATH REPORT.RELEVANT HX SPEC: NORMAL

## 2021-08-06 PROBLEM — R87.610 ASCUS OF CERVIX WITH NEGATIVE HIGH RISK HPV: Status: ACTIVE | Noted: 2018-02-20

## 2021-08-09 LAB
HUMAN PAPILLOMA VIRUS 16 DNA: NEGATIVE
HUMAN PAPILLOMA VIRUS 18 DNA: NEGATIVE
HUMAN PAPILLOMA VIRUS FINAL DIAGNOSIS: NORMAL
HUMAN PAPILLOMA VIRUS OTHER HR: NEGATIVE

## 2021-08-14 ENCOUNTER — MYC MEDICAL ADVICE (OUTPATIENT)
Dept: INTERNAL MEDICINE | Facility: CLINIC | Age: 31
End: 2021-08-14

## 2021-08-14 DIAGNOSIS — K21.9 GASTROESOPHAGEAL REFLUX DISEASE WITHOUT ESOPHAGITIS: Primary | ICD-10-CM

## 2021-08-16 NOTE — TELEPHONE ENCOUNTER
"PCP:  Please see [facility] question/request.    Shama Mane!    My allergies have been super bad lately. Each morning around 5:20am, I take 10mg of off brand Zantec. Could I take one in the morning, and one at night? I notice my throat gets itchy at night and I sneeze a ton right when I wake up.    Also, my throat as been activating up with the \"something is stuck in there\" feeling. I can't tell if this is anxiety or bad allergies with a combo of acid reflux and over-talking in my job. Going to call the EMT on Monday. I see a little white bump, however that last time I went into Urgent Care, they stated it was tonsillitis, which I believe is what it is again. It hurts to swallow at times, and my throat is in pain. I get scared that my throat is going to close up, please tell me it won't (this I think increases anxiety).    Any suggestions on what I could do if it is tonsillitis to minimize the \"something is stuck\" sensation? If it doesn't feel better by Tuesday, I'll get it checked out.    Thank you!    Would you prefer some type of visit?  I would have done this automatically except that the patient was seen 7/28    Neha Tenorio, MSN, RN  Triage RN  [facility]        "

## 2021-09-21 RX ORDER — OMEPRAZOLE 40 MG/1
40 CAPSULE, DELAYED RELEASE ORAL DAILY
Qty: 90 CAPSULE | Refills: 3 | Status: SHIPPED | OUTPATIENT
Start: 2021-09-21 | End: 2022-03-16

## 2021-10-09 ENCOUNTER — HEALTH MAINTENANCE LETTER (OUTPATIENT)
Age: 31
End: 2021-10-09

## 2022-01-03 ENCOUNTER — OFFICE VISIT (OUTPATIENT)
Dept: URGENT CARE | Facility: URGENT CARE | Age: 32
End: 2022-01-03
Payer: COMMERCIAL

## 2022-01-03 VITALS
DIASTOLIC BLOOD PRESSURE: 74 MMHG | BODY MASS INDEX: 24.63 KG/M2 | HEART RATE: 72 BPM | TEMPERATURE: 98.5 F | SYSTOLIC BLOOD PRESSURE: 110 MMHG | OXYGEN SATURATION: 97 % | WEIGHT: 148 LBS

## 2022-01-03 DIAGNOSIS — R05.9 COUGH: Primary | ICD-10-CM

## 2022-01-03 DIAGNOSIS — J03.90 TONSILLITIS: ICD-10-CM

## 2022-01-03 DIAGNOSIS — K21.00 GASTROESOPHAGEAL REFLUX DISEASE WITH ESOPHAGITIS WITHOUT HEMORRHAGE: ICD-10-CM

## 2022-01-03 LAB
DEPRECATED S PYO AG THROAT QL EIA: NEGATIVE
GROUP A STREP BY PCR: NOT DETECTED

## 2022-01-03 PROCEDURE — 87651 STREP A DNA AMP PROBE: CPT | Performed by: PHYSICIAN ASSISTANT

## 2022-01-03 PROCEDURE — U0003 INFECTIOUS AGENT DETECTION BY NUCLEIC ACID (DNA OR RNA); SEVERE ACUTE RESPIRATORY SYNDROME CORONAVIRUS 2 (SARS-COV-2) (CORONAVIRUS DISEASE [COVID-19]), AMPLIFIED PROBE TECHNIQUE, MAKING USE OF HIGH THROUGHPUT TECHNOLOGIES AS DESCRIBED BY CMS-2020-01-R: HCPCS | Performed by: PHYSICIAN ASSISTANT

## 2022-01-03 PROCEDURE — 99214 OFFICE O/P EST MOD 30 MIN: CPT | Performed by: PHYSICIAN ASSISTANT

## 2022-01-03 PROCEDURE — U0005 INFEC AGEN DETEC AMPLI PROBE: HCPCS | Performed by: PHYSICIAN ASSISTANT

## 2022-01-03 RX ORDER — CEFPROZIL 500 MG/1
500 TABLET, FILM COATED ORAL 2 TIMES DAILY
Qty: 20 TABLET | Refills: 0 | Status: SHIPPED | OUTPATIENT
Start: 2022-01-03 | End: 2022-03-16

## 2022-01-03 NOTE — PROGRESS NOTES
Assessment & Plan     Cough  covid pending, check my chart  - Symptomatic; Unknown COVID-19 Virus (Coronavirus) by PCR Nose  - Streptococcus A Rapid Scr w Reflx to PCR - Lab Collect  - Group A Streptococcus PCR Throat Swab    Tonsillitis  Hx of tonsillitis  Strep neg, culture pending  Magic mouthwash  cefzil  - magic mouthwash (ENTER INGREDIENTS IN COMMENTS) suspension; Swish and spit 5-10 mLs in mouth every 6 hours as needed 30 ml of Benadryl (12.5 mg/5 ml), 60 ml Maalox, 30 ml Viscous Lidocaine  - cefPROZIL (CEFZIL) 500 MG tablet; Take 1 tablet (500 mg) by mouth 2 times daily    Gastroesophageal reflux disease with esophagitis without hemorrhage  Start back on Omeprazole for GERD  Patient has hx of GERD    Review of external notes as documented elsewhere in note        No follow-ups on file.    Reji English PA-C  [facility] CenterSACHI Rausch is a 31 year old who presents for the following health issues     HPI     Throat pain  Cough  GERD hx    Review of Systems   Constitutional, HEENT, cardiovascular, pulmonary, gi and gu systems are negative, except as otherwise noted.      Objective    /74 (BP Location: Right arm, Patient Position: Sitting, Cuff Size: Adult Regular)   Pulse 72   Temp 98.5  F (36.9  C) (Oral)   Wt 67.1 kg (148 lb)   SpO2 97%   BMI 24.63 kg/m    Body mass index is 24.63 kg/m .  Physical Exam   GENERAL: healthy, alert and no distress  HENT: ear canals and TM's normal, nose and mouth without ulcers or lesions  NECK: no adenopathy, no asymmetry, masses, or scars and thyroid normal to palpation  RESP: lungs clear to auscultation - no rales, rhonchi or wheezes  CV: regular rate and rhythm, normal S1 S2, no S3 or S4, no murmur, click or rub, no peripheral edema and peripheral pulses strong  ABDOMEN: soft, nontender, no hepatosplenomegaly, no masses and bowel sounds normal  MS: no gross musculoskeletal defects noted, no edema  SKIN: no suspicious lesions  or rashes  NEURO: Normal strength and tone, mentation intact and speech normal    Results for orders placed or performed in visit on 01/03/22   Streptococcus A Rapid Scr w Reflx to PCR - Lab Collect     Status: Normal    Specimen: Throat; Swab   Result Value Ref Range    Group A Strep antigen Negative Negative

## 2022-01-04 LAB — SARS-COV-2 RNA RESP QL NAA+PROBE: NEGATIVE

## 2022-03-15 PROBLEM — R87.610 ASCUS OF CERVIX WITH NEGATIVE HIGH RISK HPV: Status: RESOLVED | Noted: 2018-02-20 | Resolved: 2022-03-15

## 2022-03-16 ENCOUNTER — OFFICE VISIT (OUTPATIENT)
Dept: INTERNAL MEDICINE | Facility: CLINIC | Age: 32
End: 2022-03-16
Payer: COMMERCIAL

## 2022-03-16 VITALS
RESPIRATION RATE: 16 BRPM | HEIGHT: 65 IN | OXYGEN SATURATION: 98 % | SYSTOLIC BLOOD PRESSURE: 100 MMHG | DIASTOLIC BLOOD PRESSURE: 68 MMHG | TEMPERATURE: 98.4 F | BODY MASS INDEX: 25.33 KG/M2 | HEART RATE: 68 BPM | WEIGHT: 152 LBS

## 2022-03-16 DIAGNOSIS — Z00.00 ROUTINE HISTORY AND PHYSICAL EXAMINATION OF ADULT: Primary | ICD-10-CM

## 2022-03-16 DIAGNOSIS — F33.42 RECURRENT MAJOR DEPRESSIVE DISORDER, IN FULL REMISSION (H): ICD-10-CM

## 2022-03-16 DIAGNOSIS — Z87.09 HISTORY OF RECURRENT TONSILLITIS: ICD-10-CM

## 2022-03-16 DIAGNOSIS — Z30.41 ENCOUNTER FOR SURVEILLANCE OF CONTRACEPTIVE PILLS: ICD-10-CM

## 2022-03-16 DIAGNOSIS — F41.1 GAD (GENERALIZED ANXIETY DISORDER): ICD-10-CM

## 2022-03-16 DIAGNOSIS — K21.9 GASTROESOPHAGEAL REFLUX DISEASE WITHOUT ESOPHAGITIS: ICD-10-CM

## 2022-03-16 PROCEDURE — 99395 PREV VISIT EST AGE 18-39: CPT | Performed by: INTERNAL MEDICINE

## 2022-03-16 RX ORDER — ESCITALOPRAM OXALATE 5 MG/1
5 TABLET ORAL DAILY
Qty: 90 TABLET | Refills: 3 | Status: SHIPPED | OUTPATIENT
Start: 2022-03-16 | End: 2023-02-16

## 2022-03-16 RX ORDER — OMEPRAZOLE 40 MG/1
40 CAPSULE, DELAYED RELEASE ORAL DAILY
Qty: 90 CAPSULE | Refills: 3 | Status: SHIPPED | OUTPATIENT
Start: 2022-03-16 | End: 2022-11-14

## 2022-03-16 RX ORDER — DESOGESTREL AND ETHINYL ESTRADIOL 0.15-0.03
1 KIT ORAL DAILY
Qty: 84 TABLET | Refills: 3 | Status: SHIPPED | OUTPATIENT
Start: 2022-03-16 | End: 2023-03-16

## 2022-03-16 RX ORDER — ALPRAZOLAM 0.5 MG
0.5 TABLET ORAL DAILY PRN
Qty: 30 TABLET | Refills: 0 | Status: SHIPPED | OUTPATIENT
Start: 2022-03-16 | End: 2022-11-14

## 2022-03-16 ASSESSMENT — PATIENT HEALTH QUESTIONNAIRE - PHQ9: SUM OF ALL RESPONSES TO PHQ QUESTIONS 1-9: 7

## 2022-03-16 NOTE — PROGRESS NOTES
ASSESSMENT/PLAN                                                       (Z00.00) Routine history and physical examination of adult  (primary encounter diagnosis)  Comment: PMH, PSH, FH, SH, medications, allergies, immunizations, and preventative health measures reviewed and updated as appropriate.  Plan: see below for plans.      (Z30.41) Encounter for surveillance of contraceptive pills  Plan: refills of OCP provided.     (F41.1) ODETTE (generalized anxiety disorder)  (F33.42) Recurrent major depressive disorder, in full remission (H)  Comment: well-controlled on current regimen.    Plan: continue present management; refills provided.     (K21.9) Gastroesophageal reflux disease without esophagitis  Comment: well-controlled on current regimen.    Plan: continue present management; refills provided.     (Z87.09) History of recurrent tonsillitis  Plan: referred to ENT for further evaluation - patient to schedule.     Margo Mane MD   [address] [address]  T: [phone], F: [phone]    SUBJECTIVE                                                      Miracle Robertson is a very pleasant 31 year old female who presents for a physical.    ROS:  Constitutional: no unintentional weight loss or gain reported; no fevers, chills, or sweats reported  Cardiovascular: no chest pain, palpitations, or edema reported  Respiratory: no cough, wheezing, shortness of breath, or dyspnea on exertion reported  Gastrointestinal: no nausea, vomiting, constipation, diarrhea, or abdominal pain reported  Genitourinary: no urinary frequency, urgency, dysuria, or hematuria reported  Integumentary: no rash or pruritus reported  Musculoskeletal: no back pain, muscle pain, joint pain, or joint swelling reported  Neurologic: no focal weakness, numbness, or tingling reported  Hematologic: no easy bruising or bleeding reported  Endocrine: no heat or cold intolerance reported; no polyuria or polydipsia  reported  Psychiatric: see PMH below    Past Medical History:   Diagnosis Date     ODETTE (generalized anxiety disorder)      MDD (major depressive disorder)      Past Surgical History:   Procedure Laterality Date     NO HISTORY OF SURGERY       Family History   Problem Relation Age of Onset     Deep Vein Thrombosis (DVT) Mother      Deep Vein Thrombosis (DVT) Brother      Breast Cancer Maternal Grandmother      Diabetes Type 2  Paternal Grandfather      Prostate Cancer Paternal Grandfather      Cerebrovascular Disease No family hx of      Myocardial Infarction No family hx of      Coronary Artery Disease Early Onset No family hx of      Colon Cancer No family hx of      Ovarian Cancer No family hx of      Social History     Occupational History     Occupation: [employer]   Tobacco Use     Smoking status: Never Smoker     Smokeless tobacco: Never Used   Substance and Sexual Activity     Alcohol use: Yes     Types: 1 Standard drinks or equivalent per week     Drug use: No     Sexual activity: Yes     Partners: Male     Birth control/protection: Pill   Social History Narrative    In a relationship.    Exercising daily.      No Known Allergies     Current Outpatient Medications   Medication Sig     ALPRAZolam (XANAX) 0.5 MG tablet Take 1 tablet (0.5 mg) by mouth daily as needed for anxiety     desogestrel-ethinyl estradiol (ISIBLOOM) 0.15-30 MG-MCG tablet Take 1 tablet by mouth daily     escitalopram (LEXAPRO) 5 MG tablet Take 1 tablet (5 mg) by mouth daily     omeprazole (PRILOSEC) 40 MG DR capsule Take 1 capsule (40 mg) by mouth daily     Immunization History   Administered Date(s) Administered     COVID-19CHRISTOPHER Janssen 04/07/2021     DTAP (<7y) 11/19/1993, 04/15/1997     FLU 6-35 months 01/30/2017     Flu, Unspecified 01/30/2017     HPV Quadrivalent 09/07/2007, 11/08/2007, 03/17/2008     Hep B, Peds or Adolescent 07/17/1997, 08/20/1997, 03/30/1998     Historic Hib Hib-titer 1992, 1992,  "12/17/1993     Historical DTP/aP 1992, 1992, 1992     Hpv, Unspecified  09/07/2007, 11/08/2007, 03/17/2008     Influenza (H1N1) 01/19/2010     Influenza (IIV3) PF 10/23/2015     Influenza Vaccine IM > 6 months Valent IIV4 (Alfuria,Fluzone) 02/20/2018, 01/20/2020     MMR 08/06/1993, 04/15/1997     Meningococcal (Menactra ) 01/19/2010     Meningococcal (Menveo ) 01/19/2010     Meningococcal,unspecified 01/19/2010     OPV, unspecified 1992, 1992, 11/19/1993     Poliovirus, inactivated (IPV) 04/15/1997     TDAP Vaccine (Adacel) 06/15/2007, 03/21/2019     Tdap (Adult) Unspecified Formulation 02/20/2018     Varicella 06/16/1994     PREVENTATIVE HEALTH                                                      BMI: within normal limits (increased muscle mass)  Blood pressure: within normal limits   Breast CA screening: not medically indicated at this time   Cervical CA screening: up to date   Colon CA screening: not medically indicated at this time   Lung CA screening: n/a   Dexa: not medically indicated at this time   Screening cholesterol: not medically indicated at this time   Screening diabetes: not medically indicated at this time   STD testing: no risk factors present  Alcohol misuse screening: alcohol use reviewed - no intervention indicated at this time  Immunizations: reviewed; up to date     OBJECTIVE                                                      /68 (BP Location: Left arm, Patient Position: Chair, Cuff Size: Adult Regular)   Pulse 68   Temp 98.4  F (36.9  C) (Temporal)   Resp 16   Ht 1.651 m (5' 5\")   Wt 68.9 kg (152 lb)   LMP 03/15/2022 (Exact Date)   SpO2 98%   BMI 25.29 kg/m    Constitutional: well-appearing  Head, Ears, and Eyes: normocephalic; normal external auditory canal and pinna; tympanic membranes visualized and normal; normal lids and conjunctivae  Neck: supple, symmetric, no thyromegaly or lymphadenopathy  Respiratory: normal respiratory effort; clear to " auscultation bilaterally  Cardiovascular: regular rate and rhythm; no edema  Gastrointestinal: soft, non-tender, and non-distended; no organomegaly or masses  Musculoskeletal: normal gait and station  Psych: normal judgment and insight; normal mood and affect; recent and remote memory intact    ---  (Note was completed, in part, with [employer] voice-recognition software. Documentation was reviewed, but some grammatical, spelling, and word errors may remain.)

## 2022-06-29 ENCOUNTER — MYC MEDICAL ADVICE (OUTPATIENT)
Dept: INTERNAL MEDICINE | Facility: CLINIC | Age: 32
End: 2022-06-29

## 2022-06-30 ENCOUNTER — VIRTUAL VISIT (OUTPATIENT)
Dept: FAMILY MEDICINE | Facility: CLINIC | Age: 32
End: 2022-06-30
Payer: COMMERCIAL

## 2022-06-30 DIAGNOSIS — U07.1 INFECTION DUE TO 2019 NOVEL CORONAVIRUS: Primary | ICD-10-CM

## 2022-06-30 PROCEDURE — 99213 OFFICE O/P EST LOW 20 MIN: CPT | Mod: 95 | Performed by: INTERNAL MEDICINE

## 2022-06-30 NOTE — PROGRESS NOTES
"Miracle is a 32 year old who is being evaluated via a billable video visit.      How would you like to obtain your AVS? MyChart  If the video visit is dropped, the invitation should be resent by: n/a  Will anyone else be joining your video visit? No        Assessment & Plan     Infection due to 2019 novel coronavirus  Symptom onset June 27  Recent attendance of family wedding in [place] on June 25th  COVID home antigen testing positive today  Given age and lack of associated comorbidities, not recommending antiviral therapies despite symptom onset within 5 days.  General counseling provided on timeframe of COVID infections, markers of severity, and common post COVID aftermath and symptoms.  She specifically had concerns about features of long COVID.  Reviewed over-the-counter therapies.  Currently using Sudafed, Mucinex products which I think are very reasonable.  Would defer use of Dory pot for now and reassess after this coming weekend.           BMI:   Estimated body mass index is 25.29 kg/m  as calculated from the following:    Height as of 3/16/22: 1.651 m (5' 5\").    Weight as of 3/16/22: 68.9 kg (152 lb).         No follow-ups on file.    Dandre Jean MD  [facility]    Subjective   Miracle is a 32 year old presenting for the following health issues:  Recently returned from a trip to [place] where she attended her brother's wedding this past Saturday on June 25.  Began manifesting symptoms with the following Monday.  Returned the airplane travel yesterday.  Completed home COVID test today which was positive.  Complaints of sinus congestion, rhinorrhea, chest tightness.  Specifically with significant bilateral ear pains with pressurization of cabin on her recent flight.  Covid Concern      HPI       Review of Systems   Positive for nausea  Constitutional, HEENT, cardiovascular, pulmonary, gi and gu systems are negative, except as otherwise noted.      Objective     "       Vitals:  No vitals were obtained today due to virtual visit.    Physical Exam   GENERAL: Healthy, alert and no distress  EYES: Eyes grossly normal to inspection.  No discharge or erythema, or obvious scleral/conjunctival abnormalities.   SKIN: Visible skin clear. No significant rash, abnormal pigmentation or lesions.  NEURO: Cranial nerves grossly intact.  Mentation and speech appropriate for age.  PSYCH: Mentation appears normal, affect normal/bright, judgement and insight intact, normal speech and appearance well-groomed.            Video-Visit Details    Video Start Time: 1530    Type of service:  Video Visit    Video End Time:3:50 PM    Originating Location (pt. Location): Home    Distant Location (provider location):  [facility]     Platform used for Video Visit: [place]    Christina Vasquez.

## 2022-07-08 ENCOUNTER — MYC MEDICAL ADVICE (OUTPATIENT)
Dept: INTERNAL MEDICINE | Facility: CLINIC | Age: 32
End: 2022-07-08

## 2022-07-25 ENCOUNTER — E-VISIT (OUTPATIENT)
Dept: INTERNAL MEDICINE | Facility: CLINIC | Age: 32
End: 2022-07-25
Payer: COMMERCIAL

## 2022-07-25 DIAGNOSIS — H92.02 LEFT EAR PAIN: Primary | ICD-10-CM

## 2022-07-25 PROCEDURE — 99421 OL DIG E/M SVC 5-10 MIN: CPT | Performed by: INTERNAL MEDICINE

## 2022-07-25 RX ORDER — PREDNISONE 10 MG/1
TABLET ORAL
Qty: 15 TABLET | Refills: 0 | Status: SHIPPED | OUTPATIENT
Start: 2022-07-25 | End: 2022-07-31

## 2022-09-08 ENCOUNTER — NURSE TRIAGE (OUTPATIENT)
Dept: NURSING | Facility: CLINIC | Age: 32
End: 2022-09-08

## 2022-09-08 NOTE — TELEPHONE ENCOUNTER
It is okay to leave a detailed message at this number.     Nurse Triage SBAR    Situation: Patient calling    Background:   -Abdominal and lower back pain since Tuesday afternoon  -Menstrual cycle ended on Saturday  -Hx GERD  -Hysterectomy    Assessment:   - Pain 7/10,  still able to walk and do normal activities  - interment, but lasts 30-60 min  -Has had hot flashes and headaches overnight (non now) for the past two nights  -Only one BM from Friday to Monday... now they have been more runny  - With wiping has had a irritation on vagina (non now)  -No burring with urination  -no frequency  -no urgency  - no trauma  - no chest pain or SOB  - no vomiting    Protocol Recommended Disposition:   See in Office Today    Recommendation: Schedule appointment today or go to .        Reason for Disposition    MODERATE pain (e.g., interferes with normal activities that comes and goes (cramps) lasts > 24 hours  (Exception: Pain with Vomiting or Diarrhea - see that Protocol.)    Additional Information    Negative: Abdominal pain and pregnant < 20 weeks    Negative: Abdominal pain and pregnant 20 or more weeks    Negative: Pain is mainly in upper abdomen (if needed ask: 'is it mainly above the belly button?')    Negative: Chest pain    Negative: Shock suspected (e.g., cold/pale/clammy skin, too weak to stand, low BP, rapid pulse)    Negative: Sounds like a life-threatening emergency to the triager    Negative: Passed out (i.e., fainted, collapsed and was not responding)    Negative: SEVERE abdominal pain (e.g., excruciating)    Negative: Vomiting red blood or black (coffee ground) material    Negative: Bloody, black, or tarry bowel movements  (Exception: Chronic-unchanged black-grey bowel movements and is taking iron pills or Pepto-Bismol.)    Negative: Constant abdominal pain lasting > 2 hours    Negative: Vomiting bile (green color)    Negative: Patient sounds very sick or weak to the triager    Negative: Vomiting and abdomen  looks much more swollen than usual    Negative: White of the eyes have turned yellow (i.e., jaundice)    Negative: Blood in urine (red, pink, or tea-colored)    Negative: Fever > 103 F (39.4 C)    Negative: Fever > 101 F (38.3 C) and over 60 years of age    Negative: Fever > 100.0 F (37.8 C) and has diabetes mellitus or a weak immune system (e.g., HIV positive, cancer chemotherapy, organ transplant, splenectomy, chronic steroids)    Negative: Fever > 100.0 F (37.8 C) and bedridden (e.g., nursing home patient, stroke, chronic illness, recovering from surgery)    Negative: Pregnant or could be pregnant (i.e., missed last menstrual period)    Protocols used: ABDOMINAL PAIN - FEMALE-A-OH    BRISEIDA STEWART RN on 9/8/2022 at 12:56 PM

## 2022-09-09 ENCOUNTER — OFFICE VISIT (OUTPATIENT)
Dept: FAMILY MEDICINE | Facility: CLINIC | Age: 32
End: 2022-09-09
Payer: COMMERCIAL

## 2022-09-09 ENCOUNTER — ANCILLARY PROCEDURE (OUTPATIENT)
Dept: ULTRASOUND IMAGING | Facility: CLINIC | Age: 32
End: 2022-09-09
Attending: NURSE PRACTITIONER
Payer: COMMERCIAL

## 2022-09-09 VITALS
RESPIRATION RATE: 16 BRPM | WEIGHT: 161 LBS | DIASTOLIC BLOOD PRESSURE: 70 MMHG | HEIGHT: 65 IN | BODY MASS INDEX: 26.82 KG/M2 | HEART RATE: 68 BPM | OXYGEN SATURATION: 100 % | SYSTOLIC BLOOD PRESSURE: 104 MMHG

## 2022-09-09 DIAGNOSIS — R10.30 LOWER ABDOMINAL PAIN: ICD-10-CM

## 2022-09-09 DIAGNOSIS — R10.30 LOWER ABDOMINAL PAIN: Primary | ICD-10-CM

## 2022-09-09 PROCEDURE — 76830 TRANSVAGINAL US NON-OB: CPT

## 2022-09-09 PROCEDURE — 99214 OFFICE O/P EST MOD 30 MIN: CPT | Performed by: NURSE PRACTITIONER

## 2022-09-09 NOTE — PATIENT INSTRUCTIONS
I recommend alternating omeprazole and prilosec for 2 weeks then go to only prilosec.     Also keep a food diary to see if you have any food triggers for your reflux     Think about getting a probiotic. I suggest the multiple organism kind that you keep in the fridge.  I recommend lactobacillus acidophilus (helps with reducing stomach acid) with bifido (helps with bowels)  One I recommend is on [employer] the Biotic-7 or orthobiotic     You can look into SIBO to see if that diagnosis rings a bell for you. You can see Dr Sargent, GI.   If you do treatment then I recommend a SIBO specific diet or a low fodmap diet for 2 months then slowly add foods back in.        Please call [facility] to schedule your test: [phone]

## 2022-09-09 NOTE — PROGRESS NOTES
"  Assessment & Plan   Problem List Items Addressed This Visit    None     Visit Diagnoses     Lower abdominal pain    -  Primary    Relevant Orders    US Pelvic Complete with Transvaginal         Persistent mid/low abd pain as well as low back pain. Will start with pelvic US to r/o ovarian etiology. If this is negative and symptoms are persisting we may need to complete CT.    We also discussed gut health as she gets a lot of bloating after meals and reflux. Recommend coming off of PPI and switching to H2 blocker. We discussed a weaning process.  She could have SIBO so can see Dr Sargent if she wishes.   She should contact me if she has any change in symptoms or any concerns       NASIM Wen CNP  [facility] HERBIE Rausch is a 32 year old, presenting for the following health issues:  No chief complaint on file.      HPI     Tuesday afternoon, abdominal and low back pain, felt like cramping Felt slightly nauseous   Slight Headaches and hot flashes also noted  Still has just mild cramping   last menstrual cycle ended on Saturday.  Period is very regular. Does not get any cramping with her period.  No dysuria, frequency, urgency  Was traveling this last weekend and only had one BM. Since has been normal which is twice a day. No increase in gas or belching    She does get bloating after pretty much every meal but that is her norm  Had something similar like 6 months ago but this is worse   Is on OCPs   Has anxiety but this doesn't feel like her anxiety   Wondering about UTI or ovarian cyst   Also felt like a skin burn of right labia       Review of Systems   Detailed as above         Objective    /70 (BP Location: Left arm, Patient Position: Sitting, Cuff Size: Adult Regular)   Pulse 68   Resp 16   Ht 1.651 m (5' 5\")   Wt 73 kg (161 lb)   SpO2 100%   BMI 26.79 kg/m    There is no height or weight on file to calculate BMI.  Physical Exam  Constitutional:       " Appearance: Normal appearance.   Pulmonary:      Effort: Pulmonary effort is normal.   Abdominal:      General: Bowel sounds are normal. There is no distension.      Palpations: Abdomen is soft.      Tenderness: There is abdominal tenderness (minimal mid abd about 2 inches from umbilicus at 4-5:00).   Neurological:      Mental Status: She is alert.   Psychiatric:         Mood and Affect: Mood normal.

## 2022-09-12 NOTE — RESULT ENCOUNTER NOTE
Destin Rausch, glad you were able to see the results. There is a cyst on the ovary that is likely a normal cyst. You can schedule follow-up with your doctor in a couple months to discuss reimaging if you both feel that is necessary. As a review, we all get cysts on our ovaries when we ovulate. If you are continuing to have symptoms it might be a good idea to get another ultrasound to ensure it has gone away.   I also just want to add that I am sorry if I caused you stress in regards to our conversation. Everything that I mentioned is based on science, even integrative medicine if you look at the research. I would never recommend anything without scientific background. Please let me know if you have any questions. I am happy to discuss more or to direct you to any research you'd be interested in. But continue to work with your physician. She is a great doctor and has your best interest in mind.   Beatriz

## 2022-09-17 ENCOUNTER — HEALTH MAINTENANCE LETTER (OUTPATIENT)
Age: 32
End: 2022-09-17

## 2022-10-20 ENCOUNTER — MYC MEDICAL ADVICE (OUTPATIENT)
Dept: INTERNAL MEDICINE | Facility: CLINIC | Age: 32
End: 2022-10-20

## 2022-10-21 ENCOUNTER — TELEPHONE (OUTPATIENT)
Dept: INTERNAL MEDICINE | Facility: CLINIC | Age: 32
End: 2022-10-21

## 2022-10-21 NOTE — TELEPHONE ENCOUNTER
See pt's mychart, WAs triaged at different nurse line with disposition 4 hours for sx.  Lightheaded lasted about an hour, and not present now.  See list of sx in mychart.    Pt main question is do I really need to be seen today, or can this wait til 11/2/22 visit.      Please advise. No team appts that I can tell today at our clinics.  Pati Hercules, RN  [facility] RN Triage Team

## 2022-11-02 ENCOUNTER — ANCILLARY PROCEDURE (OUTPATIENT)
Dept: ULTRASOUND IMAGING | Facility: CLINIC | Age: 32
End: 2022-11-02
Attending: INTERNAL MEDICINE
Payer: COMMERCIAL

## 2022-11-02 DIAGNOSIS — N83.202 CYST OF LEFT OVARY: ICD-10-CM

## 2022-11-02 DIAGNOSIS — N83.202 CYST OF LEFT OVARY: Primary | ICD-10-CM

## 2022-11-02 PROCEDURE — 76856 US EXAM PELVIC COMPLETE: CPT

## 2022-11-05 ENCOUNTER — LAB (OUTPATIENT)
Dept: LAB | Facility: CLINIC | Age: 32
End: 2022-11-05
Payer: COMMERCIAL

## 2022-11-05 DIAGNOSIS — K21.9 GASTROESOPHAGEAL REFLUX DISEASE WITHOUT ESOPHAGITIS: ICD-10-CM

## 2022-11-06 ENCOUNTER — APPOINTMENT (OUTPATIENT)
Dept: LAB | Facility: CLINIC | Age: 32
End: 2022-11-06
Payer: COMMERCIAL

## 2022-11-06 PROCEDURE — 87338 HPYLORI STOOL AG IA: CPT

## 2022-11-07 LAB — H PYLORI AG STL QL IA: NEGATIVE

## 2022-11-14 ENCOUNTER — OFFICE VISIT (OUTPATIENT)
Dept: INTERNAL MEDICINE | Facility: CLINIC | Age: 32
End: 2022-11-14
Payer: COMMERCIAL

## 2022-11-14 VITALS
HEART RATE: 71 BPM | BODY MASS INDEX: 27.21 KG/M2 | SYSTOLIC BLOOD PRESSURE: 114 MMHG | DIASTOLIC BLOOD PRESSURE: 68 MMHG | OXYGEN SATURATION: 96 % | TEMPERATURE: 99.4 F | WEIGHT: 163.5 LBS

## 2022-11-14 DIAGNOSIS — R10.13 DYSPEPSIA: ICD-10-CM

## 2022-11-14 DIAGNOSIS — R51.9 FRONTAL HEADACHE: ICD-10-CM

## 2022-11-14 DIAGNOSIS — Z23 HIGH PRIORITY FOR 2019-NCOV VACCINE: ICD-10-CM

## 2022-11-14 DIAGNOSIS — R10.30 LOWER ABDOMINAL PAIN: Primary | ICD-10-CM

## 2022-11-14 DIAGNOSIS — Z23 NEED FOR PROPHYLACTIC VACCINATION AND INOCULATION AGAINST INFLUENZA: ICD-10-CM

## 2022-11-14 DIAGNOSIS — Z13.29 SCREENING FOR THYROID DISORDER: ICD-10-CM

## 2022-11-14 DIAGNOSIS — N83.202 CYST OF LEFT OVARY: ICD-10-CM

## 2022-11-14 LAB
ALBUMIN SERPL-MCNC: 3.4 G/DL (ref 3.4–5)
ALP SERPL-CCNC: 54 U/L (ref 40–150)
ALT SERPL W P-5'-P-CCNC: 14 U/L (ref 0–50)
ANION GAP SERPL CALCULATED.3IONS-SCNC: 5 MMOL/L (ref 3–14)
AST SERPL W P-5'-P-CCNC: 16 U/L (ref 0–45)
BILIRUB SERPL-MCNC: 0.4 MG/DL (ref 0.2–1.3)
BUN SERPL-MCNC: 11 MG/DL (ref 7–30)
CALCIUM SERPL-MCNC: 8.9 MG/DL (ref 8.5–10.1)
CHLORIDE BLD-SCNC: 106 MMOL/L (ref 94–109)
CO2 SERPL-SCNC: 28 MMOL/L (ref 20–32)
CREAT SERPL-MCNC: 0.98 MG/DL (ref 0.52–1.04)
ERYTHROCYTE [DISTWIDTH] IN BLOOD BY AUTOMATED COUNT: 12.4 % (ref 10–15)
GFR SERPL CREATININE-BSD FRML MDRD: 79 ML/MIN/1.73M2
GLUCOSE BLD-MCNC: 109 MG/DL (ref 70–99)
HCT VFR BLD AUTO: 39.5 % (ref 35–47)
HGB BLD-MCNC: 13 G/DL (ref 11.7–15.7)
MAGNESIUM SERPL-MCNC: 2 MG/DL (ref 1.6–2.3)
MCH RBC QN AUTO: 28.5 PG (ref 26.5–33)
MCHC RBC AUTO-ENTMCNC: 32.9 G/DL (ref 31.5–36.5)
MCV RBC AUTO: 87 FL (ref 78–100)
PHOSPHATE SERPL-MCNC: 3.6 MG/DL (ref 2.5–4.5)
PLATELET # BLD AUTO: 341 10E3/UL (ref 150–450)
POTASSIUM BLD-SCNC: 3.6 MMOL/L (ref 3.4–5.3)
PROT SERPL-MCNC: 7.1 G/DL (ref 6.8–8.8)
RBC # BLD AUTO: 4.56 10E6/UL (ref 3.8–5.2)
SODIUM SERPL-SCNC: 139 MMOL/L (ref 133–144)
TSH SERPL DL<=0.005 MIU/L-ACNC: 0.64 MU/L (ref 0.4–4)
WBC # BLD AUTO: 6.4 10E3/UL (ref 4–11)

## 2022-11-14 PROCEDURE — 91312 COVID-19,PF,PFIZER BOOSTER BIVALENT: CPT | Performed by: INTERNAL MEDICINE

## 2022-11-14 PROCEDURE — 85027 COMPLETE CBC AUTOMATED: CPT | Performed by: INTERNAL MEDICINE

## 2022-11-14 PROCEDURE — 80053 COMPREHEN METABOLIC PANEL: CPT | Performed by: INTERNAL MEDICINE

## 2022-11-14 PROCEDURE — 84443 ASSAY THYROID STIM HORMONE: CPT | Performed by: INTERNAL MEDICINE

## 2022-11-14 PROCEDURE — 90686 IIV4 VACC NO PRSV 0.5 ML IM: CPT | Performed by: INTERNAL MEDICINE

## 2022-11-14 PROCEDURE — 99214 OFFICE O/P EST MOD 30 MIN: CPT | Mod: 25 | Performed by: INTERNAL MEDICINE

## 2022-11-14 PROCEDURE — 90471 IMMUNIZATION ADMIN: CPT | Performed by: INTERNAL MEDICINE

## 2022-11-14 PROCEDURE — 84100 ASSAY OF PHOSPHORUS: CPT | Performed by: INTERNAL MEDICINE

## 2022-11-14 PROCEDURE — 0124A COVID-19,PF,PFIZER BOOSTER BIVALENT: CPT | Performed by: INTERNAL MEDICINE

## 2022-11-14 PROCEDURE — 36415 COLL VENOUS BLD VENIPUNCTURE: CPT | Performed by: INTERNAL MEDICINE

## 2022-11-14 PROCEDURE — 83735 ASSAY OF MAGNESIUM: CPT | Performed by: INTERNAL MEDICINE

## 2022-11-14 RX ORDER — LANSOPRAZOLE 30 MG/1
30 CAPSULE, DELAYED RELEASE ORAL DAILY
Qty: 90 CAPSULE | Refills: 3 | Status: SHIPPED | OUTPATIENT
Start: 2022-11-14 | End: 2023-02-16

## 2022-11-14 ASSESSMENT — PATIENT HEALTH QUESTIONNAIRE - PHQ9
SUM OF ALL RESPONSES TO PHQ QUESTIONS 1-9: 6
SUM OF ALL RESPONSES TO PHQ QUESTIONS 1-9: 6
10. IF YOU CHECKED OFF ANY PROBLEMS, HOW DIFFICULT HAVE THESE PROBLEMS MADE IT FOR YOU TO DO YOUR WORK, TAKE CARE OF THINGS AT HOME, OR GET ALONG WITH OTHER PEOPLE: NOT DIFFICULT AT ALL

## 2022-11-14 NOTE — PATIENT INSTRUCTIONS
COVID-19 booster and flu shot today.    Non-fasting labs today.    Please call [phone] to schedule CT scan.    TRIAL of lansoprazole 30mg once daily for acid reflux and gastritis.

## 2022-11-14 NOTE — PROGRESS NOTES
ASSESSMENT/PLAN                                                      (R10.30) Lower abdominal pain  (primary encounter diagnosis)  Comment: etiology unclear; do not think ovarian cyst is causing or contributing to symptoms as symptoms are bilateral and fluctuating in severity.  Plan: nonfasting labs today to evaluate for potential organic causes of/contributors to symptoms; CT abdomen pelvis ordered - patient to schedule; recommendations to follow.    (R10.13) Dyspepsia  Plan: TRIAL of lansoprazole 30 mg daily and consistently; if symptoms worsen, change, or do not improve, patient to contact MD.      (Z13.29) Screening for thyroid disorder  Plan: TSH with above labs.    (Z23) Need for prophylactic vaccination and inoculation against influenza  Plan: flu shot given today.    (Z23) High priority for 2019-nCoV vaccine  Plan: COVID-19 booster given today.    (R51.9) Frontal headache  Comment: etiology unclear; not convinced that these represent sinus headaches as patient is otherwise without sinus symptoms.  Plan: continue to monitor; may use ibuprofen as needed; consider eye exam to rule out ophthalmologic cause or contributor to headaches.    (N83.202) Cyst of left ovary  Comment: Simple; 6 cm.  Plan: repeat ultrasound in March/April 2023.    Margo Mane MD   [facility]  [address] [place] [id number]  T: [phone], F: [phone]    SUBJECTIVE                                                      Miracle Robertson is a very pleasant 32 year old female who presents with ongoing acid reflux, stomach upset, and lower abdominal pain:    Patient has a long history of acid reflux and stomach upset previously well controlled on omeprazole.  Unfortunately, omeprazole has not been working as well as it used to be. She stopped omeprazole earlier this fall due to lack of efficacy and to test for H. Pylori. H. pylori testing negative. Not on any PPI currently. She continues to struggle with acid  "reflux and stomach upset.    Patient has been struggling with lower abdominal pain for the last several months. Pain fluctuates from severe pain to discomfort.  It is always present and is worse with floor exercise.  Patient describes her pain as \"pressurized.\"  The pain can be bilateral lower abdomen or unilateral. Occasional nausea, but no vomiting.  Bowel movements are regular (daily, soft, formed, and easy to evacuate).    Patient has a known simple, 6cm, left ovarian cyst. Repeat ultrasound recommended in March/April 2023.    Finally, patient complains of weekly \"sinus headaches.\"  Patient reports that these headaches are frontal in nature and moderate to severe in severity.  They only occur once a week.  Her allergies are well controlled on a daily oral antihistamine and Flonase. No sinus congestion, runny nose, or postnasal drip. No sinus pressure or pain. No fevers or chills.    Unrelated to above, patient is due for her flu shot and COVID-19 booster.    OBJECTIVE                                                      /68   Pulse 71   Temp 99.4  F (37.4  C) (Tympanic)   Wt 74.2 kg (163 lb 8 oz)   LMP 10/31/2022 (Exact Date)   SpO2 96%   BMI 27.21 kg/m    Constitutional: well-appearing  Respiratory: normal respiratory effort; clear to auscultation bilaterally  Cardiovascular: regular rate and rhythm; no edema  Gastrointestinal: soft, non-tender, and non-distended; no organomegaly or masses  Musculoskeletal: normal gait and station  Psych: normal judgment and insight; normal mood and affect; recent and remote memory intact    ---    (Note documentation was completed, in part, with [employer] voice-recognition software. Documentation was reviewed, but some grammatical, spelling, and word errors may remain.)      "

## 2022-12-05 ENCOUNTER — NURSE TRIAGE (OUTPATIENT)
Dept: INTERNAL MEDICINE | Facility: CLINIC | Age: 32
End: 2022-12-05

## 2022-12-05 ENCOUNTER — OFFICE VISIT (OUTPATIENT)
Dept: FAMILY MEDICINE | Facility: CLINIC | Age: 32
End: 2022-12-05
Payer: COMMERCIAL

## 2022-12-05 VITALS
WEIGHT: 158.25 LBS | HEART RATE: 89 BPM | SYSTOLIC BLOOD PRESSURE: 113 MMHG | BODY MASS INDEX: 26.33 KG/M2 | TEMPERATURE: 98.8 F | RESPIRATION RATE: 12 BRPM | OXYGEN SATURATION: 96 % | DIASTOLIC BLOOD PRESSURE: 73 MMHG

## 2022-12-05 DIAGNOSIS — S05.02XA ABRASION OF LEFT CORNEA, INITIAL ENCOUNTER: Primary | ICD-10-CM

## 2022-12-05 PROCEDURE — 99213 OFFICE O/P EST LOW 20 MIN: CPT

## 2022-12-05 RX ORDER — FLUTICASONE PROPIONATE 50 MCG
SPRAY, SUSPENSION (ML) NASAL
COMMUNITY
Start: 2022-10-17 | End: 2023-03-16

## 2022-12-05 RX ORDER — ERYTHROMYCIN 5 MG/G
0.5 OINTMENT OPHTHALMIC 4 TIMES DAILY
Qty: 14 G | Refills: 0 | Status: SHIPPED | OUTPATIENT
Start: 2022-12-05 | End: 2022-12-12

## 2022-12-05 ASSESSMENT — ENCOUNTER SYMPTOMS
EYE PAIN: 1
CONSTITUTIONAL NEGATIVE: 1
EYE REDNESS: 1
EYE DISCHARGE: 0
PHOTOPHOBIA: 1

## 2022-12-05 ASSESSMENT — VISUAL ACUITY: OU: 1

## 2022-12-05 NOTE — PROGRESS NOTES
Assessment & Plan       ICD-10-CM    1. Abrasion of left cornea, initial encounter  S05.02XA erythromycin (ROMYCIN) 5 MG/GM ophthalmic ointment         Miracle is to use ointment as directed, avoid contacts x 1 week. She is to monitor for new or worsening symptoms.     Return if symptoms worsen or fail to improve, for Follow up.    At the end of the encounter, I discussed results, diagnosis, medications. Discussed red flags for immediate return to clinic/ER, as well as indications for follow up if no improvement. Patient understood and agreed to plan. Patient was stable for discharge.    Subjective     Miracle is a 32 year old female who presents to clinic today for the following health issues:  Chief Complaint   Patient presents with     Urgent Care     Eye Problem     Redness on left eye, painful when looking at a screen or bright light, muscle pain above left eye, changed new contacts on 12/3, watery clear drainage x1day     Miracle presents with reports of left eye pain, photophobia x 1 day. She reports she is having clear drainage. She reports she has two week contacts and changes them daily.           Review of Systems   Constitutional: Negative.    HENT: Negative.    Eyes: Positive for photophobia, pain and redness. Negative for discharge and visual disturbance.   Skin: Negative.        Problem List:  2018-02: ASCUS of cervix with negative high risk HPV  ODETTE (generalized anxiety disorder)  MDD (major depressive disorder)      Past Medical History:   Diagnosis Date     ODETTE (generalized anxiety disorder)      MDD (major depressive disorder)        Social History     Tobacco Use     Smoking status: Never     Smokeless tobacco: Never   Substance Use Topics     Alcohol use: Yes     Alcohol/week: 1.0 standard drink     Types: 1 Standard drinks or equivalent per week           Objective    /73   Pulse 89   Temp 98.8  F (37.1  C) (Oral)   Resp 12   Wt 71.8 kg (158 lb 4 oz)   LMP 11/29/2022 (Exact Date)    SpO2 96%   BMI 26.33 kg/m    Physical Exam  Constitutional:       Appearance: Normal appearance.   HENT:      Head: Normocephalic and atraumatic.   Eyes:      General: Lids are normal. Lids are everted, no foreign bodies appreciated. Vision grossly intact.         Right eye: No discharge or hordeolum.      Extraocular Movements: Extraocular movements intact.      Right eye: Normal extraocular motion and no nystagmus.      Conjunctiva/sclera:      Right eye: Right conjunctiva is injected.      Pupils: Pupils are equal, round, and reactive to light.      Right eye: Corneal abrasion and fluorescein uptake present.   Musculoskeletal:      Cervical back: Normal range of motion and neck supple.   Skin:     General: Skin is warm and dry.   Neurological:      General: No focal deficit present.      Mental Status: She is alert and oriented to person, place, and time.              Ted Treviño PA-C

## 2022-12-05 NOTE — TELEPHONE ENCOUNTER
Patient called with CC of eye pain:    Onset: Yesterday, 12/04/22  Timing: Constant  Severity: 6/10. Hurts to open left eye all the way. Cannot look at something bright like snow  Location: Lids feel strained, below the eyebrow. White of the eye is redenned, watery  Location/Swelling: all around eye, above and below  Cause: No idea  Vision: no changes  Eye Discharge: Clear, some crusty  Fever: denies  Other symptoms: headache on temple of left eye. No nasal discharge, no facial rash    Disposition:  Go to [facility]. Patient agreed to disposition. No appointments available at [facility], [facility], or [facility]. Writer made appointment:  12/5/2022 11:40 AM [facility]          Reason for Disposition    Eye pain/discomfort and more than mild    Additional Information    Negative: Followed an eye injury    Negative: Eye pain from chemical in the eye    Negative: Eye pain from foreign body in eye    Negative: Has sinus pain or pressure    Negative: Severe eye pain    Negative: Complete loss of vision in one or both eyes    Negative: Eyelids are very swollen (shut or almost) and fever    Negative: Eyelid (outer) is very red and fever    Negative: Foreign body sensation ('feels like something is in there') and irrigation didn't help    Negative: Vomiting    Negative: Ulcer or sore seen on the cornea (clear center part of the eye)    Negative: Recent eye surgery and increasing eye pain    Negative: Blurred vision and new or worsening    Negative: Patient sounds very sick or weak to the triager    Protocols used: EYE PAIN-A-OH

## 2022-12-08 ENCOUNTER — OFFICE VISIT (OUTPATIENT)
Dept: OPTOMETRY | Facility: CLINIC | Age: 32
End: 2022-12-08
Attending: INTERNAL MEDICINE
Payer: COMMERCIAL

## 2022-12-08 DIAGNOSIS — H57.12 ACUTE LEFT EYE PAIN: Primary | ICD-10-CM

## 2022-12-08 DIAGNOSIS — H16.142 PUNCTATE KERATITIS OF LEFT EYE: ICD-10-CM

## 2022-12-08 PROCEDURE — 92002 INTRM OPH EXAM NEW PATIENT: CPT | Performed by: OPTOMETRIST

## 2022-12-08 RX ORDER — NEOMYCIN SULFATE, POLYMYXIN B SULFATE AND DEXAMETHASONE 3.5; 10000; 1 MG/ML; [USP'U]/ML; MG/ML
1 SUSPENSION/ DROPS OPHTHALMIC 4 TIMES DAILY
Qty: 5 ML | Refills: 0 | Status: SHIPPED | OUTPATIENT
Start: 2022-12-08 | End: 2022-12-24

## 2022-12-08 ASSESSMENT — VISUAL ACUITY
OD_CC+: -2
METHOD: SNELLEN - LINEAR
CORRECTION_TYPE: GLASSES
OD_CC: 20/20
OS_CC+: -3
OS_CC: 20/25

## 2022-12-08 ASSESSMENT — SLIT LAMP EXAM - LIDS
COMMENTS: NORMAL
COMMENTS: NORMAL

## 2022-12-08 ASSESSMENT — TONOMETRY
IOP_METHOD: APPLANATION
OS_IOP_MMHG: 15

## 2022-12-08 ASSESSMENT — CUP TO DISC RATIO
OD_RATIO: 0.2
OS_RATIO: 0.2

## 2022-12-08 ASSESSMENT — EXTERNAL EXAM - LEFT EYE: OS_EXAM: NORMAL

## 2022-12-08 ASSESSMENT — EXTERNAL EXAM - RIGHT EYE: OD_EXAM: NORMAL

## 2022-12-08 NOTE — PATIENT INSTRUCTIONS
Discontinue contact lens wear until instructed to do do.    Maxitrol- 1 drop left eye 4 x day for days.      Artificial tears- 1 drop left eye up to 4 x day.    Ok to continue Erythromycin ointment at night.    Follow up at the [place] [facility] in 1 week for recheck or sooner if needed.    Mandeep Rhodes, LANDEN    The affects of the dilating drops last for 4- 6 hours.  You will be more sensitive to light and vision will be blurry up close.  Do not drive if you do not feel comfortable.  Mydriatic sunglasses were given if needed.      Optometry Providers       Clinic Locations                                 Telephone Number   Dr. Leonila Pineda [phone]     [facility]:                [facility] Hours:       [facility]:   [address]   [address]     [address]  [place] [address]   [place] [address]    [place] [address]  Phone: [phone]                    Phone: [phone]     Phone: [phone]                      Monday 8:00-6:00                          Monday 8:00-6:00                          Monday 8:00-6:00              Tuesday 8:00-6:00                          Tuesday 8:00-6:00                          Tuesday 8:00-6:00              Wednesday 8:00-6:00                  Wednesday 8:00-6:00                   Wednesday 8:00-6:00      Thursday 8:00-6:00                        Thursday 8:00-6:00                         Thursday 8:00-6:00            Friday 8:00-5:00                              Friday 8:00-5:00                              Friday 8:00-5:00    [facility] Hours:   [address] [place] [id number] [phone]    Monday 9:00-6:00  Tuesday 9:00-6:00  Wednesday 9:00-6:00  Thursday 9:00-6:00  Friday 9:00-5:00  Please log on to [email or web address] to order your contact lenses.  The link is  found on the [facility] page.  As always, Thank you for trusting us with your health care needs!

## 2022-12-08 NOTE — PROGRESS NOTES
Chief Complaint   Patient presents with     Eye Pain     Patient went to doctor 4 days ago and was given erythromycin ointment and lubricant eye drops. Treatments did not help. Patient did take a nap 6 days ago with contacts in. Patient had covid 7-2022 and has had sinus headaches since then. Os ear hurts also.     Laterality: In left eye   Quality: foreign body sensation, sharp pain, and throbbing   Pain scale: 9/10   Frequency: constantly   Duration: 5 days   Course: gradually worsening   Associated symptoms: blurred vision, foreign body sensation, discharge, redness, itching, photophobia, tearing, swelling, and headaches   Treatments tried: eye drops   Response to treatment: no improvement   Comments: Patient went to doctor 4 days ago and was given erythromycin ointment and lubricant eye drops. Treatments did not help. Patient did take a nap 6 days ago with contacts in. Patient had covid 7-2022 and has had sinus headaches since then. Os ear hurts also.         Medical, surgical and family histories reviewed and updated 12/8/2022.       OBJECTIVE: See Ophthalmology exam    ASSESSMENT:    ICD-10-CM    1. Acute left eye pain  H57.12 Adult Eye  Referral     neomycin-polymyxin-dexamethasone (MAXITROL) 3.5-03494-3.1 SUSP ophthalmic susp      2. Punctate keratitis of left eye  H16.142          PLAN:     Patient Instructions   Discontinue contact lens wear until instructed to do do.    Maxitrol- 1 drop left eye 4 x day for days.      Artificial tears- 1 drop left eye up to 4 x day.    Ok to continue Erythromycin ointment at night.    Follow up at the [place] [facility] in 1 week for recheck or sooner if needed.    Mandeep Rhodes, OD

## 2022-12-08 NOTE — LETTER
12/8/2022         RE: Miracle Robertson  [address]  [facility] [address]        Dear Colleague,    Thank you for referring your patient, Miracle Robertson, to the [facility]. Please see a copy of my visit note below.    Chief Complaint   Patient presents with     Eye Pain     Patient went to doctor 4 days ago and was given erythromycin ointment and lubricant eye drops. Treatments did not help. Patient did take a nap 6 days ago with contacts in. Patient had covid 7-2022 and has had sinus headaches since then. Os ear hurts also.     Laterality: In left eye   Quality: foreign body sensation, sharp pain, and throbbing   Pain scale: 9/10   Frequency: constantly   Duration: 5 days   Course: gradually worsening   Associated symptoms: blurred vision, foreign body sensation, discharge, redness, itching, photophobia, tearing, swelling, and headaches   Treatments tried: eye drops   Response to treatment: no improvement   Comments: Patient went to doctor 4 days ago and was given erythromycin ointment and lubricant eye drops. Treatments did not help. Patient did take a nap 6 days ago with contacts in. Patient had covid 7-2022 and has had sinus headaches since then. Os ear hurts also.         Medical, surgical and family histories reviewed and updated 12/8/2022.       OBJECTIVE: See Ophthalmology exam    ASSESSMENT:    ICD-10-CM    1. Acute left eye pain  H57.12 Adult Eye  Referral     neomycin-polymyxin-dexamethasone (MAXITROL) 3.5-41085-1.1 SUSP ophthalmic susp      2. Punctate keratitis of left eye  H16.142          PLAN:     Patient Instructions   Discontinue contact lens wear until instructed to do do.    Maxitrol- 1 drop left eye 4 x day for days.      Artificial tears- 1 drop left eye up to 4 x day.    Ok to continue Erythromycin ointment at night.    Follow up at the [place] [facility] in 1 week for recheck or sooner if needed.    Mandeep Rhodes, OD           Again, thank you for  allowing me to participate in the care of your patient.        Sincerely,        Mandeep Rhodes, OD

## 2022-12-14 ENCOUNTER — ALLIED HEALTH/NURSE VISIT (OUTPATIENT)
Dept: OPTOMETRY | Facility: CLINIC | Age: 32
End: 2022-12-14
Payer: COMMERCIAL

## 2022-12-14 DIAGNOSIS — H04.129 DRY EYE: ICD-10-CM

## 2022-12-14 DIAGNOSIS — H16.222 KERATITIS SICCA, LEFT EYE: Primary | ICD-10-CM

## 2022-12-14 PROCEDURE — 92012 INTRM OPH EXAM EST PATIENT: CPT | Performed by: OPTOMETRIST

## 2022-12-14 ASSESSMENT — VISUAL ACUITY
METHOD: SNELLEN - LINEAR
OS_CC+: -1
OS_CC: 20/25
CORRECTION_TYPE: GLASSES

## 2022-12-14 ASSESSMENT — EXTERNAL EXAM - RIGHT EYE: OD_EXAM: NORMAL

## 2022-12-14 ASSESSMENT — EXTERNAL EXAM - LEFT EYE: OS_EXAM: NORMAL

## 2022-12-14 NOTE — PROGRESS NOTES
Chief Complaint   Patient presents with     Eye Pain Follow-Up   Patient presents for a follow up for eye pain. Dr. Rhodes prescribed Maxitrol for 1 week. Patient reports good compliance on drops using 4 times daily. Last drop was this morning. She also using artificial tears once daily.     Patient reports no more pain in the eye just feels dried out.     Do you wear contact lenses? Yes - has not worn them since all of this     Acuvue oasys prior pair had possibly been over worn past 2 weeks and then slept in new lens   Miracle Mosley - Optometric Assistant      See Review Of Systems     Acuvue oasys prior pair had possibly been over worn past 2 weeks and then slept in new lens     Medical, surgical and family histories reviewed and updated 12/14/2022.         OBJECTIVE: See Ophthalmology exam    ASSESSMENT:    ICD-10-CM    1. Keratitis sicca, left eye (H)  M35.01       2. Dry eye  H04.129        secondary to contact lens overwear?  Resolved keratitis    PLAN:  Warm compresses/ lid hygiene to remove precipitates   maxitrol two times daily today and tomorrow and artificial tears two times daily   Then artificial tears four times daily as needed , wait to resume wear until eye feels back to normal and not dry     Coco Astudillo OD

## 2022-12-14 NOTE — PATIENT INSTRUCTIONS
You may use rewetting drops such as blink or refresh contacts over lenses   and artificial tears when lenses are out    There are over the counter drops that work well and may be used up to 4 x daily when lenses are out if your eyes are dry. ( systane , refresh, thera tears) If you need more than 4 drops daily, use a preservative free product which come in individual vials and may be used for 24 hours until finished and discarded.     If you are in not in dailies, consider clear care solution if still having problems and reduce wear time to 10-12 hours   Clear care will also deep clean/ and disinfect lenses better but must sit in the case that includes a disc for 6 hours to neutralize the peroxide solution before wear.   Make sure to read product directions , this can not go directly in the eye or be used as a rinse for your lenses.      DRY EYE TREATMENT    I recommend using artificial tears for your dry eye. There are over the counter drops that work well and may be used up to 4x daily. ( systane balance or ultra, blink, refresh optive, soothe xp) stay away from any red eye relief products such as visine and clear eyes.     If you need more than 4 drops daily, use a preservative free product which come in individual vials and may be used for 24 hours and discarded.   The more severe the dry eye, the more frequent instillation of artificial tears  that are needed to reduce irritation/ inflammation. (Sometimes every 1-2 hours for a couple of days).  You can also add a lubricating ointment in the lower lid at bedtime. ( over the counter refresh pm, genteal gel, lacrilube etc.)    Artificial tears work best as a preventative and not as well after your eyes are starting to bother you and/ or are red.  It may take 4- 6 weeks of using the drops before you notice improvement.  If after that time you are still having problems schedule an appointment for an evaluation to discuss different treatments.    Dry eyes are a  chronic condition and you may have more symptoms at certain times of the year.      Additional recommended treatment:  Warm compresses once to twice daily for 5-10 minutes    Directions for warm soaks  There are few methods for hot compresses. Moisten a washcloth with hot water, or microwave for 10 seconds, being careful to not get the cloth too hot.   Then put the washcloth onto your eyelids for 5 minutes. It will cool quickly so a rice pack or eyemask that can be heated and laid on top of the washcloth will help retain the heat.      Overabundance of bacterial microorganisms along the eyelashes and lid margins induce stress on the tear film and promote inflammation.  Regular lid hygiene helps diminish the bacterial population to prevent inflammation and infection.  Use a warm compress to loosen crusts   Cleanse lids once daily with a lid cleansing product as directed such as Ocusoft or Sterilid which can be purchased at most pharmacies. Diluted baby shampoo will also work, but not as well as it dries the skin and can irritate eyelids.  Hypo chlor spray may also be used on closed lids.      Omega 3 fatty acid supplements taken 1-2x daily  Recommend  at least  2000mg omega 3  800 EPA  600 DHA    Blink regularly  Stay hydrated  Increase humidity  Wear sunglasses   Avoid direct exposure to forced air--turn air vents in the car away and keep fans from blowing directly on your face

## 2023-02-04 ENCOUNTER — MYC MEDICAL ADVICE (OUTPATIENT)
Dept: INTERNAL MEDICINE | Facility: CLINIC | Age: 33
End: 2023-02-04
Payer: COMMERCIAL

## 2023-02-08 NOTE — TELEPHONE ENCOUNTER
Recommend visit for further evaluation - can be video or office visit.    ---    May use any lunch slot or virtual/virtual release slot.

## 2023-02-08 NOTE — TELEPHONE ENCOUNTER
Patient Contact    Attempt # 1 to: [phone]    Was call answered?  No.  Left message on voicemail with information to call TC back to schedule.     MyChart sent to patient with provider's recommendation.

## 2023-02-08 NOTE — TELEPHONE ENCOUNTER
Spoke with patient to assist scheduling. Patient scheduled for OV 2/16/2023 with PCP. Patient had no additional questions or concerns.

## 2023-02-16 ENCOUNTER — OFFICE VISIT (OUTPATIENT)
Dept: INTERNAL MEDICINE | Facility: CLINIC | Age: 33
End: 2023-02-16
Payer: COMMERCIAL

## 2023-02-16 VITALS
BODY MASS INDEX: 26.79 KG/M2 | WEIGHT: 161 LBS | SYSTOLIC BLOOD PRESSURE: 110 MMHG | OXYGEN SATURATION: 97 % | DIASTOLIC BLOOD PRESSURE: 73 MMHG | HEART RATE: 80 BPM | TEMPERATURE: 98.3 F

## 2023-02-16 DIAGNOSIS — F33.42 RECURRENT MAJOR DEPRESSIVE DISORDER, IN FULL REMISSION (H): ICD-10-CM

## 2023-02-16 DIAGNOSIS — R51.9 SINUS HEADACHE: Primary | ICD-10-CM

## 2023-02-16 DIAGNOSIS — H53.10 EYE STRAIN, BILATERAL: ICD-10-CM

## 2023-02-16 DIAGNOSIS — R51.9 FREQUENT HEADACHES: ICD-10-CM

## 2023-02-16 DIAGNOSIS — R10.13 DYSPEPSIA: ICD-10-CM

## 2023-02-16 PROCEDURE — 99214 OFFICE O/P EST MOD 30 MIN: CPT | Performed by: INTERNAL MEDICINE

## 2023-02-16 RX ORDER — LANSOPRAZOLE 30 MG/1
30 CAPSULE, DELAYED RELEASE ORAL DAILY
Qty: 90 CAPSULE | Refills: 3 | Status: SHIPPED | OUTPATIENT
Start: 2023-02-16 | End: 2023-08-03

## 2023-02-16 RX ORDER — FAMOTIDINE 20 MG/1
20 TABLET, FILM COATED ORAL 2 TIMES DAILY PRN
Qty: 60 TABLET | Refills: 3 | Status: SHIPPED | OUTPATIENT
Start: 2023-02-16 | End: 2023-03-16

## 2023-02-16 ASSESSMENT — PATIENT HEALTH QUESTIONNAIRE - PHQ9
SUM OF ALL RESPONSES TO PHQ QUESTIONS 1-9: 5
10. IF YOU CHECKED OFF ANY PROBLEMS, HOW DIFFICULT HAVE THESE PROBLEMS MADE IT FOR YOU TO DO YOUR WORK, TAKE CARE OF THINGS AT HOME, OR GET ALONG WITH OTHER PEOPLE: NOT DIFFICULT AT ALL
SUM OF ALL RESPONSES TO PHQ QUESTIONS 1-9: 5

## 2023-02-17 NOTE — PROGRESS NOTES
ASSESSMENT/PLAN                                                      (R51.9) Sinus headache  (primary encounter diagnosis)  Comment: indicated location is sinuses but no other sinus symptoms; differential also includes eye strain, tension headaches, or atypical migraines.   Plan: CT Sinus w/o Contrast ordered - patient to schedule.     (R51.9) Frequent headaches  (H53.10) Eye strain, bilateral  Plan: referred for comprehensive eye exam - patient will be contacted to schedule.      (R10.13) Dyspepsia  Comment: recurrent.  Plan: RESTART Prevacid daily and consistently x 3 months; may use Pepcid bid as needed for acute relief of symptoms; if symptoms worsen, change, or do not improve after 3 months, patient to contact MD.      (F33.42) Recurrent major depressive disorder, in full remission (H)  Comment: currently off of low dose Lexapro; seeing a therapist 2x/month; doing okay.  Plan: we will touch base again/reassess at next visit (physical scheduled for next month).     Margo Mane MD   [address] [address]  T: [phone], F: [phone]    SUBJECTIVE                                                      Miracle Robertson is a very pleasant 32 year old female who presents with headaches:    She has been getting 1-2 headaches a week since she got COVID-19 in July, 2022. Indicates frontal, maxillary, and ethmoid sinuses bilaterally. She describes the headaches as buzzing, warm, and tension/pressure-like in quality. Headaches are moderate-severe in severity. Hard to look at screens during headaches. Associated nausea but not vomiting. Tylenol sinus helps somewhat.    No sinus congestion, runny nose, or post-nasal drip. No sore throat, cough, sneezing, or itchy, watery, or red eyes.     Patient also reports recurrent stomach upset and heartburn after meals. Restarted a couple months ago. Has been off of her PPI for several months.     Finally, she is not on Lexapro  anymore. Kept forgetting to refill and ultimately, after multiple missed doses, decided to try going without. She is doing okay without it. She is seeing a therapist twice a month.     OBJECTIVE                                                      /73   Pulse 80   Temp 98.3  F (36.8  C) (Temporal)   Wt 73 kg (161 lb)   LMP 01/27/2023 (Within Days)   SpO2 97%   BMI 26.79 kg/m    Constitutional: well-appearing  Psych: normal judgment and insight; normal mood and affect; recent and remote memory intact    ---    (Note documentation was completed, in part, with KAYAK voice-recognition software. Documentation was reviewed, but some grammatical, spelling, and word errors may remain.)

## 2023-02-28 ENCOUNTER — ANCILLARY PROCEDURE (OUTPATIENT)
Dept: CT IMAGING | Facility: CLINIC | Age: 33
End: 2023-02-28
Attending: INTERNAL MEDICINE
Payer: COMMERCIAL

## 2023-02-28 DIAGNOSIS — R51.9 SINUS HEADACHE: ICD-10-CM

## 2023-02-28 PROCEDURE — 70486 CT MAXILLOFACIAL W/O DYE: CPT

## 2023-03-06 ENCOUNTER — OFFICE VISIT (OUTPATIENT)
Dept: OPTOMETRY | Facility: CLINIC | Age: 33
End: 2023-03-06
Payer: COMMERCIAL

## 2023-03-06 DIAGNOSIS — H53.10 EYE STRAIN, BILATERAL: Primary | ICD-10-CM

## 2023-03-06 DIAGNOSIS — H52.13 MYOPIA OF BOTH EYES: ICD-10-CM

## 2023-03-06 DIAGNOSIS — R51.9 FREQUENT HEADACHES: ICD-10-CM

## 2023-03-06 DIAGNOSIS — H52.223 REGULAR ASTIGMATISM OF BOTH EYES: ICD-10-CM

## 2023-03-06 DIAGNOSIS — H02.889 MEIBOMIAN GLAND DYSFUNCTION: ICD-10-CM

## 2023-03-06 PROCEDURE — 92014 COMPRE OPH EXAM EST PT 1/>: CPT | Performed by: OPTOMETRIST

## 2023-03-06 PROCEDURE — 92015 DETERMINE REFRACTIVE STATE: CPT | Performed by: OPTOMETRIST

## 2023-03-06 PROCEDURE — 92310 CONTACT LENS FITTING OU: CPT | Performed by: OPTOMETRIST

## 2023-03-06 ASSESSMENT — CONF VISUAL FIELD
OS_INFERIOR_NASAL_RESTRICTION: 0
OS_SUPERIOR_TEMPORAL_RESTRICTION: 0
OS_NORMAL: 1
OD_SUPERIOR_TEMPORAL_RESTRICTION: 0
OD_SUPERIOR_NASAL_RESTRICTION: 0
OD_INFERIOR_TEMPORAL_RESTRICTION: 0
OD_INFERIOR_NASAL_RESTRICTION: 0
OD_NORMAL: 1
OS_INFERIOR_TEMPORAL_RESTRICTION: 0
OS_SUPERIOR_NASAL_RESTRICTION: 0

## 2023-03-06 ASSESSMENT — REFRACTION_MANIFEST
OD_SPHERE: -4.25
OS_SPHERE: -3.75
OS_CYLINDER: -0.50
OS_AXIS: 010
OD_AXIS: 155
OD_CYLINDER: -0.25

## 2023-03-06 ASSESSMENT — REFRACTION_WEARINGRX
OD_SPHERE: -4.50
OS_AXIS: 136
OD_SPHERE: -4.25
OS_SPHERE: -4.50
OS_CYLINDER: +0.50
OS_SPHERE: -4.00
SPECS_TYPE: EXAM
OD_CYLINDER: +0.50
OD_AXIS: 053

## 2023-03-06 ASSESSMENT — EXTERNAL EXAM - RIGHT EYE: OD_EXAM: NORMAL

## 2023-03-06 ASSESSMENT — VISUAL ACUITY
OD_CC: 20/20
OD_SC: 20/400-
OS_CC+: -1
OD_CC: 20/25-1
OS_CC: 20/25-1
CORRECTION_TYPE: GLASSES
OS_CC: 20/20
METHOD: SNELLEN - LINEAR
OS_SC: 20/400-
OD_CC+: -1

## 2023-03-06 ASSESSMENT — REFRACTION_CURRENTRX
OS_SPHERE: -4.00
OD_SPHERE: -4.00
OS_BRAND: J&J ACUVUE OASYS BC 8.4, D 14.0
OD_BRAND: J&J ACUVUE OASYS BC 8.4, D 14.0

## 2023-03-06 ASSESSMENT — CUP TO DISC RATIO
OS_RATIO: 0.2
OD_RATIO: 0.2

## 2023-03-06 ASSESSMENT — KERATOMETRY
OS_K2POWER_DIOPTERS: 44.00
OD_AXISANGLE2_DEGREES: 143
OD_AXISANGLE_DEGREES: 053
OD_K2POWER_DIOPTERS: 44.00
OS_AXISANGLE2_DEGREES: 030
OD_K1POWER_DIOPTERS: 43.50
OS_K1POWER_DIOPTERS: 43.50
OS_AXISANGLE_DEGREES: 120

## 2023-03-06 ASSESSMENT — EXTERNAL EXAM - LEFT EYE: OS_EXAM: NORMAL

## 2023-03-06 ASSESSMENT — TONOMETRY
OS_IOP_MMHG: 13
IOP_METHOD: TONOPEN
OD_IOP_MMHG: 14

## 2023-03-06 ASSESSMENT — SLIT LAMP EXAM - LIDS
COMMENTS: MEIBOMIAN GLAND DYSFUNCTION
COMMENTS: MEIBOMIAN GLAND DYSFUNCTION

## 2023-03-06 NOTE — LETTER
3/6/2023         RE: Miracle Robertson  [address]  [facility] [address]        Dear Colleague,    Thank you for referring your patient, Miracle Robertson, to the [facility]. Please see a copy of my visit note below.    Chief Complaint   Patient presents with     Annual Eye Exam     Headaches and eye strain.  Referral from PCP to rule out eyes causing headaches.    Headaches seem to be sinus pressure related.  Not increased with screen work.     Previous contact lens wearer? Yes: asya willson  Comfort of contact lenses :good,comfort is fine wonders if rx is too strong   Satisfied with current lenses: rx too strong?    Last Eye Exam: 4-  Dilated Previously: Yes    What are you currently using to see?  glasses and contacts    Distance Vision Acuity: Satisfied with vision    Near Vision Acuity: Satisfied with vision while reading  with glasses and contacts    Eye Comfort: good, but pain around eyes  Do you use eye drops? : No  Occupation or Hobbies: virtual nutritional counseling       Linda Hammond Optometric Assistant, A.B.O.C.     Medical, surgical and family histories reviewed and updated 3/6/2023.       OBJECTIVE: See Ophthalmology exam    ASSESSMENT:    ICD-10-CM    1. Eye strain, bilateral  H53.10 Adult Eye  Referral     EYE EXAM (SIMPLE-NONBILLABLE)      2. Frequent headaches  R51.9 Adult Eye  Referral     EYE EXAM (SIMPLE-NONBILLABLE)      3. Meibomian gland dysfunction  H02.889       4. Myopia of both eyes  H52.13 REFRACTION     CONTACT LENS FITTING,BILAT w/ signed waiver      5. Regular astigmatism of both eyes  H52.223 CONTACT LENS FITTING,BILAT w/ signed waiver         PLAN:     Patient Instructions   Follow up with PCP about headaches.  Most likely not related to eyes.    Heat to the eyes daily for 10-15 minutes nightly with warm washcloth or reusable gel masks from the pharmacy or  [employer] heat masks can be purchased at [employer].    [employer]  Hypochlor- spray solution onto cotton pad.  Close eyes and gently apply to eyelids and eyelashes using side to side motion.  Use morning and evening.    Artificial tears- 1 drop both eyes 2-4 x daily.    Eyeglass prescription given.    Dispensed trial contacts.  Ok to order preferred lens.    Contact lens prescription given and form signed.    Return in 1 year for a complete eye exam or sooner if needed.    Mandeep Rhodes, OD                              Again, thank you for allowing me to participate in the care of your patient.        Sincerely,        Mandeep Rhodes, OD

## 2023-03-06 NOTE — PATIENT INSTRUCTIONS
Follow up with PCP about headaches.  Most likely not related to eyes.    Heat to the eyes daily for 10-15 minutes nightly with warm washcloth or reusable gel masks from the pharmacy or  [employer] heat masks can be purchased at [employer].    Ocusoft Hypochlor- spray solution onto cotton pad.  Close eyes and gently apply to eyelids and eyelashes using side to side motion.  Use morning and evening.    Artificial tears- 1 drop both eyes 2-4 x daily.    Eyeglass prescription given.    Dispensed trial contacts.  Ok to order preferred lens.    Contact lens prescription given and form signed.    Return in 1 year for a complete eye exam or sooner if needed.    Mandeep Rhodes, OD    The affects of the dilating drops last for 4- 6 hours.  You will be more sensitive to light and vision will be blurry up close.  Do not drive if you do not feel comfortable.  Mydriatic sunglasses were given if needed.      Optometry Providers       Clinic Locations                                 Telephone Number   [facility] [phone]     [facility] Hours:                [facility] Hours:       [facility] Hours:   [address]   [address]      [address] [place] [address]   [place] [address]    [place] [address]  Phone: [phone]                    Phone: [phone]     Phone: [phone]                      Monday 8:00-6:00                          Monday 8:00-6:00 Monday 8:00-6:00              Tuesday 8:00-6:00                          Tuesday 8:00-6:00                          Tuesday 8:00-6:00              Wednesday 8:00-6:00                  Wednesday 8:00-6:00                   Wednesday 8:00-6:00      Thursday 8:00-6:00                        Thursday 8:00-6:00                         Thursday 8:00-6:00            Friday  8:00-5:00                              Friday 8:00-5:00                              Friday 8:00-5:00    [facility] Hours:   [address] [place] [address]  [phone]    Monday 9:00-6:00  Tuesday 9:00-6:00  Wednesday 9:00-6:00  Thursday 9:00-6:00  Friday 9:00-5:00  Please log on to [email or web address] to order your contact lenses.  The link is found on the [facility] page.  As always, Thank you for trusting us with your health care needs!

## 2023-03-06 NOTE — PROGRESS NOTES
Chief Complaint   Patient presents with     Annual Eye Exam     Headaches and eye strain.  Referral from PCP to rule out eyes causing headaches.    Headaches seem to be sinus pressure related.  Not increased with screen work.     Previous contact lens wearer? Yes: asya willson  Comfort of contact lenses :good,comfort is fine wonders if rx is too strong   Satisfied with current lenses: rx too strong?    Last Eye Exam: 4-  Dilated Previously: Yes    What are you currently using to see?  glasses and contacts    Distance Vision Acuity: Satisfied with vision    Near Vision Acuity: Satisfied with vision while reading  with glasses and contacts    Eye Comfort: good, but pain around eyes  Do you use eye drops? : No  Occupation or Hobbies: virtual nutritional counseling       Linda Hammond Optometric Assistant, A.B.O.C.     Medical, surgical and family histories reviewed and updated 3/6/2023.       OBJECTIVE: See Ophthalmology exam    ASSESSMENT:    ICD-10-CM    1. Eye strain, bilateral  H53.10 Adult Eye  Referral     EYE EXAM (SIMPLE-NONBILLABLE)      2. Frequent headaches  R51.9 Adult Eye  Referral     EYE EXAM (SIMPLE-NONBILLABLE)      3. Meibomian gland dysfunction  H02.889       4. Myopia of both eyes  H52.13 REFRACTION     CONTACT LENS FITTING,BILAT w/ signed waiver      5. Regular astigmatism of both eyes  H52.223 CONTACT LENS FITTING,BILAT w/ signed waiver         PLAN:     Patient Instructions   Follow up with PCP about headaches.  Most likely not related to eyes.    Heat to the eyes daily for 10-15 minutes nightly with warm washcloth or reusable gel masks from the pharmacy or  [employer] heat masks can be purchased at [employer].    Ocusoft Hypochlor- spray solution onto cotton pad.  Close eyes and gently apply to eyelids and eyelashes using side to side motion.  Use morning and evening.    Artificial tears- 1 drop both eyes 2-4 x daily.    Eyeglass prescription given.    Dispensed trial contacts.   Ok to order preferred lens.    Contact lens prescription given and form signed.    Return in 1 year for a complete eye exam or sooner if needed.    Mandeep Rhodes, OD

## 2023-03-15 PROBLEM — F33.42 RECURRENT MAJOR DEPRESSIVE DISORDER, IN FULL REMISSION (H): Status: RESOLVED | Noted: 2023-02-16 | Resolved: 2023-03-15

## 2023-03-16 ENCOUNTER — OFFICE VISIT (OUTPATIENT)
Dept: INTERNAL MEDICINE | Facility: CLINIC | Age: 33
End: 2023-03-16
Payer: COMMERCIAL

## 2023-03-16 VITALS
SYSTOLIC BLOOD PRESSURE: 101 MMHG | WEIGHT: 159.3 LBS | HEART RATE: 76 BPM | OXYGEN SATURATION: 98 % | TEMPERATURE: 98 F | RESPIRATION RATE: 16 BRPM | BODY MASS INDEX: 26.51 KG/M2 | DIASTOLIC BLOOD PRESSURE: 68 MMHG

## 2023-03-16 DIAGNOSIS — J30.2 SEASONAL ALLERGIC RHINITIS, UNSPECIFIED TRIGGER: ICD-10-CM

## 2023-03-16 DIAGNOSIS — R51.9 FREQUENT HEADACHES: ICD-10-CM

## 2023-03-16 DIAGNOSIS — F41.1 GAD (GENERALIZED ANXIETY DISORDER): ICD-10-CM

## 2023-03-16 DIAGNOSIS — F33.1 MODERATE EPISODE OF RECURRENT MAJOR DEPRESSIVE DISORDER (H): ICD-10-CM

## 2023-03-16 DIAGNOSIS — R41.840 DIFFICULTY CONCENTRATING: ICD-10-CM

## 2023-03-16 DIAGNOSIS — Z00.00 ROUTINE HISTORY AND PHYSICAL EXAMINATION OF ADULT: Primary | ICD-10-CM

## 2023-03-16 DIAGNOSIS — Z30.41 ENCOUNTER FOR SURVEILLANCE OF CONTRACEPTIVE PILLS: ICD-10-CM

## 2023-03-16 PROCEDURE — 99395 PREV VISIT EST AGE 18-39: CPT | Performed by: INTERNAL MEDICINE

## 2023-03-16 RX ORDER — FLUTICASONE PROPIONATE 50 MCG
2 SPRAY, SUSPENSION (ML) NASAL DAILY
Qty: 16 G | Refills: 11 | Status: SHIPPED | OUTPATIENT
Start: 2023-03-16 | End: 2023-08-03

## 2023-03-16 RX ORDER — DESOGESTREL AND ETHINYL ESTRADIOL 0.15-0.03
1 KIT ORAL DAILY
Qty: 84 TABLET | Refills: 3 | Status: SHIPPED | OUTPATIENT
Start: 2023-03-16 | End: 2024-01-29

## 2023-03-16 ASSESSMENT — ENCOUNTER SYMPTOMS
DYSURIA: 0
ABDOMINAL PAIN: 0
HEMATURIA: 0
HEARTBURN: 1
WEAKNESS: 0
HEMATOCHEZIA: 0
PALPITATIONS: 0
SHORTNESS OF BREATH: 0
DIARRHEA: 0
PARESTHESIAS: 0
DIZZINESS: 0
COUGH: 0
EYE PAIN: 0
CHILLS: 0
JOINT SWELLING: 0
NAUSEA: 1
MYALGIAS: 1
CONSTIPATION: 0
FREQUENCY: 0
BREAST MASS: 0
ARTHRALGIAS: 1
HEADACHES: 1
FEVER: 0
NERVOUS/ANXIOUS: 1
SORE THROAT: 0

## 2023-03-16 NOTE — PROGRESS NOTES
ASSESSMENT/PLAN                                                       (Z00.00) Routine history and physical examination of adult  (primary encounter diagnosis)  Comment: PMH, PSH, FH, SH, medications, allergies, immunizations, and preventative health measures reviewed and updated as appropriate.   Plan: see below for plans.      (Z30.41) Encounter for surveillance of contraceptive pills  Plan: refills of OCP provided.     (J30.2) Seasonal allergic rhinitis, unspecified trigger  Comment: well-controlled on current regimen.    Plan: continue present management; refills provided.     (R41.840) Difficulty concentrating  Plan: referred for ADHD testing - patient will be contacted to schedule.      (R51.9) Frequent headaches  Comment: improved as of late; recent normal CT sinus.  Plan: will continue to monitor; if symptoms worsen again or change, patient to contact MD - next step: neurology referral.       (F33.1) Moderate episode of recurrent major depressive disorder (H)  (F41.1) ODETTE (generalized anxiety disorder)  Comment: poorly-controlled with therapy alone but patient felt fatigued with Lexapro and would prefer non-medication interventions for now.   Plan: continue therapy; patient knows to contact me if medication desired.     Margo Mane MD   [place] [address] [address] [place] [id number]  T: [phone], F: [phone]    SUBJECTIVE                                                      Miracle Robertson is a very pleasant 32 year old female who presents for a physical.    ROS:  Constitutional: no unintentional weight loss or gain reported; no fevers, chills, or sweats reported  Cardiovascular: no chest pain, palpitations, or edema reported  Respiratory: no cough, wheezing, shortness of breath, or dyspnea on exertion reported  Gastrointestinal: no nausea, vomiting, constipation, diarrhea, or abdominal pain reported  Genitourinary: no urinary frequency, urgency, dysuria, or hematuria  reported  Integumentary: no rash or pruritus reported  Musculoskeletal: no back pain, muscle pain, joint pain, or joint swelling reported  Neurologic: no focal weakness, numbness, or tingling reported  Hematologic: no easy bruising or bleeding reported  Endocrine: no heat or cold intolerance reported; no polyuria or polydipsia reported  Psychiatric: see PMH below    Past Medical History:   Diagnosis Date     ODETTE (generalized anxiety disorder)      MDD (major depressive disorder)      Past Surgical History:   Procedure Laterality Date     NO HISTORY OF SURGERY       Family History   Problem Relation Age of Onset     Deep Vein Thrombosis (DVT) Mother      Deep Vein Thrombosis (DVT) Brother      Cancer Maternal Grandmother      Breast Cancer Maternal Grandmother      Diabetes Paternal Grandfather      Diabetes Type 2  Paternal Grandfather      Prostate Cancer Paternal Grandfather      Cerebrovascular Disease No family hx of      Myocardial Infarction No family hx of      Coronary Artery Disease Early Onset No family hx of      Colon Cancer No family hx of      Ovarian Cancer No family hx of      Social History     Occupational History     Occupation: [employer]   Tobacco Use     Smoking status: Never     Smokeless tobacco: Never   Vaping Use     Vaping Use: Never used   Substance and Sexual Activity     Alcohol use: Yes     Drug use: No     Sexual activity: Yes     Partners: Male     Birth control/protection: Pill     No Known Allergies     Current Outpatient Medications   Medication Sig     desogestrel-ethinyl estradiol (ISIBLOOM) 0.15-30 MG-MCG tablet Take 1 tablet by mouth daily     famotidine (PEPCID) 20 MG tablet Take 1 tablet (20 mg) by mouth 2 times daily as needed (stomach upset)     fluticasone (FLONASE) 50 MCG/ACT nasal spray      LANsoprazole (PREVACID) 30 MG DR capsule Take 1 capsule (30 mg) by mouth daily     Loratadine (CLARITIN PO)      Immunization History   Administered Date(s)  Administered     COVID-19 Vaccine (Norma) 04/07/2021     COVID-19 Vaccine 12+ (Pfizer) 11/03/2021     COVID-19 Vaccine Bivalent Booster 12+ (Pfizer) 11/14/2022     DTAP (<7y) 11/19/1993, 04/15/1997     FLU 6-35 months 01/30/2017     Flu, Unspecified 01/30/2017     HPV Quadrivalent 09/07/2007, 11/08/2007, 03/17/2008     Hepatits B (Peds <19Y) 07/17/1997, 08/20/1997, 03/30/1998     Historic Hib Hib-titer 1992, 1992, 12/17/1993     Historical DTP/aP 1992, 1992, 1992     Hpv, Unspecified  09/07/2007, 11/08/2007, 03/17/2008     Influenza (H1N1) 01/19/2010     Influenza (IIV3) PF 10/23/2015     Influenza Vaccine >6 months (Alfuria,Fluzone) 02/20/2018, 01/20/2020, 11/18/2020, 11/03/2021, 11/14/2022     MMR 08/06/1993, 04/15/1997     Meningococcal ACWY (Menactra ) 01/19/2010     Meningococcal ACWY (Menveo ) 01/19/2010     Meningococcal,unspecified 01/19/2010     OPV, trivalent, live 04/15/1997     OPV, unspecified 1992, 1992, 11/19/1993     Poliovirus, inactivated (IPV) 04/15/1997     TDAP Vaccine (Adacel) 06/15/2007, 03/21/2019     Tdap (Adult) Unspecified Formulation 02/20/2018     Varicella 06/16/1994     PREVENTATIVE HEALTH                                                      BMI: overweight  Blood pressure: within normal limits   Breast CA screening: not medically indicated at this time   Cervical CA screening: up to date   Colon CA screening: not medically indicated at this time   Lung CA screening: n/a   Dexa: not medically indicated at this time   Screening cholesterol: not medically indicated at this time   Screening diabetes: not medically indicated at this time   STD testing: STD testing offered - declined by patient  Alcohol misuse screening: alcohol use reviewed - no intervention indicated at this time  Immunizations: reviewed; up to date     OBJECTIVE                                                      /68 (BP Location: Left arm)   Pulse 76   Temp 98  F  (36.7  C) (Oral)   Resp 16   Wt 72.3 kg (159 lb 4.8 oz)   LMP 02/27/2023 (Within Days)   SpO2 98%   BMI 26.51 kg/m    Constitutional: well-appearing  Head, Ears, and Eyes: normocephalic; normal external auditory canal and pinna; tympanic membranes visualized and normal; normal lids and conjunctivae  Neck: supple, symmetric, no thyromegaly or lymphadenopathy  Respiratory: normal respiratory effort; clear to auscultation bilaterally  Cardiovascular: regular rate and rhythm; no edema  Gastrointestinal: soft, non-tender, and non-distended; no organomegaly or masses  Musculoskeletal: normal gait and station  Psych: normal judgment and insight; normal mood and affect; recent and remote memory intact    ---  (Note was completed, in part, with [employer] voice-recognition software. Documentation was reviewed, but some grammatical, spelling, and word errors may remain.)

## 2023-04-04 ENCOUNTER — TELEPHONE (OUTPATIENT)
Dept: OPTOMETRY | Facility: CLINIC | Age: 33
End: 2023-04-04
Payer: COMMERCIAL

## 2023-04-04 NOTE — TELEPHONE ENCOUNTER
[facility] Call Center    Phone Message    May a detailed message be left on voicemail: yes     Reason for Call: Symptoms or Concerns     If patient has red-flag symptoms, warm transfer to triage line    Current symptom or concern: Pt reports that on March 23 she began having Lt eye redness and pressure, as well as crusting. She believes she has another eye infection.    Symptoms have been present for:  1-2 week(s)    Has patient previously been seen for this? Yes    By : Dr. Rhodes    Date: 3/6/2023    Are there any new or worsening symptoms? No      Action Taken: Message routed to:  Other: [facility]    Travel Screening: Not Applicable

## 2023-04-04 NOTE — TELEPHONE ENCOUNTER
Sent patient a [employer] message and recommended she schedule an appointment.    Mandeep Rhodes OD

## 2023-04-05 ENCOUNTER — OFFICE VISIT (OUTPATIENT)
Dept: OPTOMETRY | Facility: CLINIC | Age: 33
End: 2023-04-05
Payer: COMMERCIAL

## 2023-04-05 DIAGNOSIS — H57.12 ACUTE LEFT EYE PAIN: Primary | ICD-10-CM

## 2023-04-05 DIAGNOSIS — H10.13 ALLERGIC CONJUNCTIVITIS OF BOTH EYES: ICD-10-CM

## 2023-04-05 PROCEDURE — 99213 OFFICE O/P EST LOW 20 MIN: CPT | Performed by: OPTOMETRIST

## 2023-04-05 RX ORDER — NEOMYCIN SULFATE, POLYMYXIN B SULFATE AND DEXAMETHASONE 3.5; 10000; 1 MG/ML; [USP'U]/ML; MG/ML
SUSPENSION/ DROPS OPHTHALMIC
Qty: 1.4 ML | Refills: 0 | Status: SHIPPED | OUTPATIENT
Start: 2023-04-05 | End: 2023-04-27

## 2023-04-05 RX ORDER — OLOPATADINE HYDROCHLORIDE 2 MG/ML
1 SOLUTION/ DROPS OPHTHALMIC DAILY
Qty: 2.5 ML | Refills: 11 | Status: SHIPPED | OUTPATIENT
Start: 2023-04-05 | End: 2023-08-02

## 2023-04-05 ASSESSMENT — VISUAL ACUITY
METHOD: SNELLEN - LINEAR
CORRECTION_TYPE: GLASSES
OD_CC+: -2
OD_CC: 20/20
OS_CC+: -2
OS_CC: 20/20

## 2023-04-05 ASSESSMENT — EXTERNAL EXAM - RIGHT EYE: OD_EXAM: NORMAL

## 2023-04-05 ASSESSMENT — EXTERNAL EXAM - LEFT EYE: OS_EXAM: NORMAL

## 2023-04-05 ASSESSMENT — SLIT LAMP EXAM - LIDS
COMMENTS: NORMAL
COMMENTS: NORMAL

## 2023-04-05 ASSESSMENT — TONOMETRY
OS_IOP_MMHG: 12
IOP_METHOD: TONOPEN

## 2023-04-05 NOTE — PATIENT INSTRUCTIONS
Increase Maxitrol- 1 drop left eye 4 x day for 7 days then one drop 3 x day for 3 days, 2 x day for 2 days then once in the morning and discontinue.    Non preserved artificial tears- 1 drop left eye 2-4 x daily.  Systane Complete.    Heat to the eyes daily for 10-15 minutes nightly with warm washcloth or reusable gel masks from the pharmacy or  [employer] heat masks can be purchased at [employer].    Ok to add  Pataday to be used once daily for allergies.    Return in 2-3 weeks for recheck or sooner if needed.    Recommend using 1 use contact lenses and not  Acuvue Oasys- 2 week.    Mandeep Rhodes, OD

## 2023-04-05 NOTE — LETTER
4/5/2023         RE: Miracle Robertson  [address]  [facility][1] [id number]        Dear Colleague,    Thank you for referring your patient, Miracle Robertson, to the [facility]. Please see a copy of my visit note below.    Chief Complaint   Patient presents with     Eye Pain     Patient has been using Maxitrol drops. Patient wears contacts but has not worn since march 24th. Patient does not sleep in her contacts.     Laterality: In left eye   Quality: foreign body sensation, pressure, and throbbing   Pain scale: 5/10   Frequency: intermittently   Duration: 2 weeks   Course: gradually improving   Associated symptoms: foreign body sensation, discharge, redness, and photophobia   Treatments tried: eye drops   Response to treatment: mild improvement   Comments: Patient has been using Maxitrol eye drops. Patient wears contacts but has not worn since march 24th. Patient does not sleep in her contacts.       History of punctate keratitis left eye.  12/8/2022.    Using sterilid eyelid spray cleanser since early March.    Warm heat- 3 x week.    Lubricant eye drops 2-3x day.  Generic bottle    Had been using  e-Go aeroplanes 2 week lenses when this started.  Was also fit into a 1 use lens which used with no problem.          Medical, surgical and family histories reviewed and updated 4/5/2023.       OBJECTIVE: See Ophthalmology exam    ASSESSMENT:    ICD-10-CM    1. Acute left eye pain  H57.12 neomycin-polymyxin-dexamethasone (MAXITROL) 3.5-10514-6.1 SUSP ophthalmic susp      2. Allergic conjunctivitis of both eyes  H10.13 olopatadine (PATADAY) 0.2 % ophthalmic solution         PLAN:     Patient Instructions   Increase Maxitrol- 1 drop left eye 4 x day for 7 days then one drop 3 x day for 3 days, 2 x day for 2 days then once in the morning and discontinue.    Non preserved artificial tears- 1 drop left eye 2-4 x daily.  Systane Complete.    Heat to the eyes daily for 10-15 minutes nightly with  warm washcloth or reusable gel masks from the pharmacy or  [employer] heat masks can be purchased at [employer].    Ok to add  Pataday to be used once daily for allergies.    Return in 2-3 weeks for recheck or sooner if needed.    Recommend using 1 use contact lenses and not  Acuvue Oasys- 2 week.    Mandeep Rhodes, LANDEN                Again, thank you for allowing me to participate in the care of your patient.        Sincerely,        Mandeep Rhodes OD

## 2023-04-05 NOTE — PROGRESS NOTES
Chief Complaint   Patient presents with     Eye Pain     Patient has been using Maxitrol drops. Patient wears contacts but has not worn since march 24th. Patient does not sleep in her contacts.     Laterality: In left eye   Quality: foreign body sensation, pressure, and throbbing   Pain scale: 5/10   Frequency: intermittently   Duration: 2 weeks   Course: gradually improving   Associated symptoms: foreign body sensation, discharge, redness, and photophobia   Treatments tried: eye drops   Response to treatment: mild improvement   Comments: Patient has been using Maxitrol eye drops. Patient wears contacts but has not worn since march 24th. Patient does not sleep in her contacts.       History of punctate keratitis left eye.  12/8/2022.    Using sterilid eyelid spray cleanser since early March.    Warm heat- 3 x week.    Lubricant eye drops 2-3x day.  Generic bottle    Had been using  Orate 2 week lenses when this started.  Was also fit into a 1 use lens which used with no problem.          Medical, surgical and family histories reviewed and updated 4/5/2023.       OBJECTIVE: See Ophthalmology exam    ASSESSMENT:    ICD-10-CM    1. Acute left eye pain  H57.12 neomycin-polymyxin-dexamethasone (MAXITROL) 3.5-99059-3.1 SUSP ophthalmic susp      2. Allergic conjunctivitis of both eyes  H10.13 olopatadine (PATADAY) 0.2 % ophthalmic solution         PLAN:     Patient Instructions   Increase Maxitrol- 1 drop left eye 4 x day for 7 days then one drop 3 x day for 3 days, 2 x day for 2 days then once in the morning and discontinue.    Non preserved artificial tears- 1 drop left eye 2-4 x daily.  Systane Complete.    Heat to the eyes daily for 10-15 minutes nightly with warm washcloth or reusable gel masks from the pharmacy or  [employer] heat masks can be purchased at [employer].    Ok to add  Pataday to be used once daily for allergies.    Return in 2-3 weeks for recheck or sooner if needed.    Recommend using 1 use contact  lenses and not  Acuvue Oasys- 2 week.    Mandeep Rhodes, OD

## 2023-04-12 NOTE — TELEPHONE ENCOUNTER
DIAGNOSIS: Left Hip pain   APPOINTMENT DATE: 4.14.23   NOTES STATUS DETAILS   OFFICE NOTE from referring provider Internal 4.11.23 Margo Mane MD   OFFICE NOTE from other specialist Internal 5.20.17 Allen Vazquez PA-C   MEDICATION LIST Internal

## 2023-04-14 ENCOUNTER — ANCILLARY PROCEDURE (OUTPATIENT)
Dept: GENERAL RADIOLOGY | Facility: CLINIC | Age: 33
End: 2023-04-14
Attending: FAMILY MEDICINE
Payer: COMMERCIAL

## 2023-04-14 ENCOUNTER — PRE VISIT (OUTPATIENT)
Dept: ORTHOPEDICS | Facility: CLINIC | Age: 33
End: 2023-04-14

## 2023-04-14 ENCOUNTER — OFFICE VISIT (OUTPATIENT)
Dept: ORTHOPEDICS | Facility: CLINIC | Age: 33
End: 2023-04-14
Attending: INTERNAL MEDICINE
Payer: COMMERCIAL

## 2023-04-14 DIAGNOSIS — M54.50 LOW BACK PAIN, UNSPECIFIED BACK PAIN LATERALITY, UNSPECIFIED CHRONICITY, UNSPECIFIED WHETHER SCIATICA PRESENT: ICD-10-CM

## 2023-04-14 DIAGNOSIS — R10.30 GROIN PAIN, UNSPECIFIED LATERALITY: ICD-10-CM

## 2023-04-14 DIAGNOSIS — M25.559 HIP PAIN, UNSPECIFIED LATERALITY: ICD-10-CM

## 2023-04-14 DIAGNOSIS — M54.16 LUMBAR BACK PAIN WITH RADICULOPATHY AFFECTING LEFT LOWER EXTREMITY: Primary | ICD-10-CM

## 2023-04-14 PROCEDURE — 73502 X-RAY EXAM HIP UNI 2-3 VIEWS: CPT | Mod: LT | Performed by: RADIOLOGY

## 2023-04-14 PROCEDURE — 72100 X-RAY EXAM L-S SPINE 2/3 VWS: CPT | Performed by: STUDENT IN AN ORGANIZED HEALTH CARE EDUCATION/TRAINING PROGRAM

## 2023-04-14 PROCEDURE — 99204 OFFICE O/P NEW MOD 45 MIN: CPT | Performed by: FAMILY MEDICINE

## 2023-04-14 RX ORDER — TRAMADOL HYDROCHLORIDE 50 MG/1
50 TABLET ORAL EVERY 6 HOURS PRN
Qty: 15 TABLET | Refills: 0 | Status: SHIPPED | OUTPATIENT
Start: 2023-04-14 | End: 2023-05-03

## 2023-04-14 RX ORDER — PREDNISONE 20 MG/1
40 TABLET ORAL DAILY
Qty: 10 TABLET | Refills: 0 | Status: SHIPPED | OUTPATIENT
Start: 2023-04-14 | End: 2023-05-03

## 2023-04-14 NOTE — PROGRESS NOTES
CHIEF COMPLAINT:  Pain of the Left Hip       HISTORY OF PRESENT ILLNESS  Ms. Robertson is a pleasant 32 year old year old female who presents to clinic today with left hip.  Miracle explains that she has been experiencing left anterior/posterior hip pain.      Onset: gradual  Location: left hip  Quality:  aching, dull and burning  Duration: 1 months   Severity: 7/10 at worst  Timing:intermittent episodes worse with sitting for long periods, squatting  Modifying factors:  resting and non-use makes it better, movement and use makes it worse  Associated signs & symptoms: burning and pain  Previous similar pain: No  Treatments to date: HEP, chiro, aleve at night    Additional history: as documented    Review of Systems:    Have you recently had a a fever, chills, weight loss? No    Do you have any vision problems? No    Do you have any chest pain or edema? No    Do you have any shortness of breath or wheezing?  No    Do you have stomach problems? No    Do you have any numbness or focal weakness? No    Do you have diabetes? No    Do you have problems with bleeding or clotting? No    Do you have an rashes or other skin lesions? No    MEDICAL HISTORY  Patient Active Problem List   Diagnosis     ODETTE (generalized anxiety disorder)     MDD (major depressive disorder)       Current Outpatient Medications   Medication Sig Dispense Refill     desogestrel-ethinyl estradiol (ISIBLOOM) 0.15-30 MG-MCG tablet Take 1 tablet by mouth daily 84 tablet 3     fluticasone (FLONASE) 50 MCG/ACT nasal spray Spray 2 sprays into both nostrils daily 16 g 11     LANsoprazole (PREVACID) 30 MG DR capsule Take 1 capsule (30 mg) by mouth daily 90 capsule 3     neomycin-polymyxin-dexamethasone (MAXITROL) 3.5-70709-3.1 SUSP ophthalmic susp Place 1 drop Into the left eye 4 times daily for 7 days, THEN 1 drop 3 times daily for 7 days, THEN 1 drop 2 times daily for 7 days, THEN 1 drop daily for 1 day. 1.4 mL 0     olopatadine (PATADAY) 0.2 % ophthalmic  solution Place 0.05 mLs (1 drop) into both eyes daily 2.5 mL 11       No Known Allergies    Family History   Problem Relation Age of Onset     Deep Vein Thrombosis (DVT) Mother      Deep Vein Thrombosis (DVT) Brother      Breast Cancer Maternal Grandmother      Diabetes Type 2  Paternal Grandfather      Prostate Cancer Paternal Grandfather      Cerebrovascular Disease No family hx of      Myocardial Infarction No family hx of      Coronary Artery Disease Early Onset No family hx of      Colon Cancer No family hx of      Ovarian Cancer No family hx of        Additional medical/Social/Surgical histories reviewed in [facility] and updated as appropriate.       PHYSICAL EXAM  LMP 02/27/2023 (Within Days)     General  - normal appearance, in no obvious distress  CV  - normal peripheral perfusion  Pulm  - normal respiratory pattern, non-labored  Musculoskeletal - lumbar spine  - stance: normal gait without limp, no obvious leg length discrepancy, normal heel and toe walk  - inspection: normal bone and joint alignment, no obvious scoliosis  - palpation: no paravertebral or bony tenderness  - ROM: flexion exacerbates pain, normal extension, sidebending, rotation  - strength: lower extremities 5/5 in all planes  - special tests:  (-) straight leg raise  (-) slump test  Musculoskeletal - Left  - inspection: no swelling of anterolateral hip,  normal bone and joint alignment, no obvious deformity  - palpation: no lateral or anterior hip tenderness  - ROM: normal flexion, extension, IR, ER, abduction, adduction, not painful, no crepitus  - strength: 5/5 in all planes  - special tests:  (-) FABIEN  (+) FADIR groin.  no pain with axial femoral load  Neuro  - No lower extremity sensory deficits, grossly normal coordination, normal muscle tone  Skin  - no ecchymosis, erythema, warmth, or induration, no obvious rash  Psych  - interactive, appropriate, normal mood and affect    IMAGING :XR lumbar 2 views, XR pelvis with hip left. Final  results and radiologist's interpretation, available in the [facility] health record. Images were reviewed with the patient/family members in the office today. My personal interpretation of the performed imaging is normal hip xray.  No acute osseous abnormality, DJD or disc narrowing of lumbar spine.     ASSESSMENT & PLAN  Ms. Robertson is a 32 year old year old female who presents to clinic today with acute left sided hip pain with radiation down left thigh.    Concern at this time greatest for lumbar radiculitis given component of burning pain traveling around hip to thigh as well as intensity of pain.  Additionally, Miracle does have findings to suggest hip impingement with +FADIR at groin.      XR reviewed and unremarkable    Diagnosis:   1. Acute pain of left hip    -Tramadol for breakthrough pain  -Prednisone 40mg x 5 days  -Acute spine care pathway for PT, hip as well  -Consideration for NSAIDs (Aleve) after prednisone completed  -Discuss MRI lumbar spine if no improvement  -Follow up in 4 weeks    It was a pleasure seeing Miracle today.    Brian Ivan DO, CAQSM  Primary Care Sports Medicine

## 2023-04-14 NOTE — LETTER
4/14/2023      RE: Miracle Robertson  [address]  [facility] [address]     Dear Colleague,    Thank you for referring your patient, Miracle Robertson, to the [facility] [place]. Please see a copy of my visit note below.    CHIEF COMPLAINT:  Pain of the Left Hip       HISTORY OF PRESENT ILLNESS  Ms. Robertson is a pleasant 32 year old year old female who presents to clinic today with left hip.  Miracle explains that she has been experiencing left anterior/posterior hip pain.      Onset: gradual  Location: left hip  Quality:  aching, dull and burning  Duration: 1 months   Severity: 7/10 at worst  Timing:intermittent episodes worse with sitting for long periods, squatting  Modifying factors:  resting and non-use makes it better, movement and use makes it worse  Associated signs & symptoms: burning and pain  Previous similar pain: No  Treatments to date: HEP, chiro, aleve at night    Additional history: as documented    Review of Systems:  Have you recently had a a fever, chills, weight loss? No  Do you have any vision problems? No  Do you have any chest pain or edema? No  Do you have any shortness of breath or wheezing?  No  Do you have stomach problems? No  Do you have any numbness or focal weakness? No  Do you have diabetes? No  Do you have problems with bleeding or clotting? No  Do you have an rashes or other skin lesions? No    MEDICAL HISTORY  Patient Active Problem List   Diagnosis    ODETTE (generalized anxiety disorder)    MDD (major depressive disorder)       Current Outpatient Medications   Medication Sig Dispense Refill    desogestrel-ethinyl estradiol (ISIBLOOM) 0.15-30 MG-MCG tablet Take 1 tablet by mouth daily 84 tablet 3    fluticasone (FLONASE) 50 MCG/ACT nasal spray Spray 2 sprays into both nostrils daily 16 g 11    LANsoprazole (PREVACID) 30 MG DR capsule Take 1 capsule (30 mg) by mouth daily 90 capsule 3    neomycin-polymyxin-dexamethasone (MAXITROL) 3.5-02201-0.1  SUSP ophthalmic susp Place 1 drop Into the left eye 4 times daily for 7 days, THEN 1 drop 3 times daily for 7 days, THEN 1 drop 2 times daily for 7 days, THEN 1 drop daily for 1 day. 1.4 mL 0    olopatadine (PATADAY) 0.2 % ophthalmic solution Place 0.05 mLs (1 drop) into both eyes daily 2.5 mL 11       No Known Allergies    Family History   Problem Relation Age of Onset    Deep Vein Thrombosis (DVT) Mother     Deep Vein Thrombosis (DVT) Brother     Breast Cancer Maternal Grandmother     Diabetes Type 2  Paternal Grandfather     Prostate Cancer Paternal Grandfather     Cerebrovascular Disease No family hx of     Myocardial Infarction No family hx of     Coronary Artery Disease Early Onset No family hx of     Colon Cancer No family hx of     Ovarian Cancer No family hx of        Additional medical/Social/Surgical histories reviewed in [facility] and updated as appropriate.       PHYSICAL EXAM  LMP 02/27/2023 (Within Days)     General  - normal appearance, in no obvious distress  CV  - normal peripheral perfusion  Pulm  - normal respiratory pattern, non-labored  Musculoskeletal - lumbar spine  - stance: normal gait without limp, no obvious leg length discrepancy, normal heel and toe walk  - inspection: normal bone and joint alignment, no obvious scoliosis  - palpation: no paravertebral or bony tenderness  - ROM: flexion exacerbates pain, normal extension, sidebending, rotation  - strength: lower extremities 5/5 in all planes  - special tests:  (-) straight leg raise  (-) slump test  Musculoskeletal - Left  - inspection: no swelling of anterolateral hip,  normal bone and joint alignment, no obvious deformity  - palpation: no lateral or anterior hip tenderness  - ROM: normal flexion, extension, IR, ER, abduction, adduction, not painful, no crepitus  - strength: 5/5 in all planes  - special tests:  (-) FABIEN  (+) FADIR groin.  no pain with axial femoral load  Neuro  - No lower extremity sensory deficits, grossly normal  coordination, normal muscle tone  Skin  - no ecchymosis, erythema, warmth, or induration, no obvious rash  Psych  - interactive, appropriate, normal mood and affect    IMAGING :XR lumbar 2 views, XR pelvis with hip left. Final results and radiologist's interpretation, available in the [facility] health record. Images were reviewed with the patient/family members in the office today. My personal interpretation of the performed imaging is normal hip xray.  No acute osseous abnormality, DJD or disc narrowing of lumbar spine.     ASSESSMENT & PLAN  Ms. Robertson is a 32 year old year old female who presents to clinic today with acute left sided hip pain with radiation down left thigh.    Concern at this time greatest for lumbar radiculitis given component of burning pain traveling around hip to thigh as well as intensity of pain.  Additionally, Miracle does have findings to suggest hip impingement with +FADIR at groin.      XR reviewed and unremarkable    Diagnosis:   1. Acute pain of left hip    -Tramadol for breakthrough pain  -Prednisone 40mg x 5 days  -Acute spine care pathway for PT, hip as well  -Consideration for NSAIDs (Aleve) after prednisone completed  -Discuss MRI lumbar spine if no improvement  -Follow up in 4 weeks    It was a pleasure seeing Miracle today.    Brian Ivan DO, CAQSM  Primary Care Sports Medicine

## 2023-04-20 ENCOUNTER — THERAPY VISIT (OUTPATIENT)
Dept: PHYSICAL THERAPY | Facility: CLINIC | Age: 33
End: 2023-04-20
Payer: COMMERCIAL

## 2023-04-20 DIAGNOSIS — M54.50 LOW BACK PAIN, UNSPECIFIED BACK PAIN LATERALITY, UNSPECIFIED CHRONICITY, UNSPECIFIED WHETHER SCIATICA PRESENT: ICD-10-CM

## 2023-04-20 DIAGNOSIS — M54.16 LUMBAR BACK PAIN WITH RADICULOPATHY AFFECTING LEFT LOWER EXTREMITY: ICD-10-CM

## 2023-04-20 DIAGNOSIS — R10.30 GROIN PAIN, UNSPECIFIED LATERALITY: ICD-10-CM

## 2023-04-20 DIAGNOSIS — M25.559 HIP PAIN, UNSPECIFIED LATERALITY: ICD-10-CM

## 2023-04-20 PROCEDURE — 97161 PT EVAL LOW COMPLEX 20 MIN: CPT | Mod: GP | Performed by: PHYSICAL THERAPIST

## 2023-04-20 PROCEDURE — 97530 THERAPEUTIC ACTIVITIES: CPT | Mod: GP | Performed by: PHYSICAL THERAPIST

## 2023-04-20 ASSESSMENT — ACTIVITIES OF DAILY LIVING (ADL)
HOS_ADL_HIGHEST_POTENTIAL_SCORE: 68
WALKING_INITIALLY: SLIGHT DIFFICULTY
PUTTING_ON_SOCKS_AND_SHOES: SLIGHT DIFFICULTY
GETTING_INTO_AND_OUT_OF_A_BATHTUB: SLIGHT DIFFICULTY
SITTING_FOR_15_MINUTES: EXTREME DIFFICULTY
STANDING_FOR_15_MINUTES: MODERATE DIFFICULTY
HOS_ADL_ITEM_SCORE_TOTAL: 37
HEAVY_WORK: EXTREME DIFFICULTY
TWISTING/PIVOTING_ON_INVOLVED_LEG: EXTREME DIFFICULTY
HOS_ADL_COUNT: 17
GOING_DOWN_1_FLIGHT_OF_STAIRS: SLIGHT DIFFICULTY
LIGHT_TO_MODERATE_WORK: SLIGHT DIFFICULTY
WALKING_UP_STEEP_HILLS: MODERATE DIFFICULTY
WALKING_APPROXIMATELY_10_MINUTES: SLIGHT DIFFICULTY
RECREATIONAL_ACTIVITIES: EXTREME DIFFICULTY
HOW_WOULD_YOU_RATE_YOUR_CURRENT_LEVEL_OF_FUNCTION_DURING_YOUR_USUAL_ACTIVITIES_OF_DAILY_LIVING_FROM_0_TO_100_WITH_100_BEING_YOUR_LEVEL_OF_FUNCTION_PRIOR_TO_YOUR_HIP_PROBLEM_AND_0_BEING_THE_INABILITY_TO_PERFORM_ANY_OF_YOUR_USUAL_DAILY_ACTIVITIES?: 50
WALKING_15_MINUTES_OR_GREATER: SLIGHT DIFFICULTY
STEPPING_UP_AND_DOWN_CURBS: SLIGHT DIFFICULTY
GETTING_INTO_AND_OUT_OF_AN_AVERAGE_CAR: SLIGHT DIFFICULTY
ROLLING_OVER_IN_BED: EXTREME DIFFICULTY
GOING_UP_1_FLIGHT_OF_STAIRS: MODERATE DIFFICULTY
DEEP_SQUATTING: EXTREME DIFFICULTY
WALKING_DOWN_STEEP_HILLS: MODERATE DIFFICULTY
HOS_ADL_SCORE(%): 54.41

## 2023-04-20 NOTE — PROGRESS NOTES
Physical Therapy Initial Evaluation  Subjective:  The history is provided by the patient.   Therapist Generated HPI Evaluation  Problem details: Miracle is a 32 year old woman who works as a .  Had been lifting heavy weights and then added running and took snowboarding lessons 2/15/2023.  Dropped back to body weight exercises and eventually just walking in the past 2 weeks.  Also took oral prednisone pack, so is slightly better but looking for ways to improve to get back to her job/workouts faster.  Lumbar AROM RFIS to mid lower leg slight incr, NOÉ 20% slight increase, R SB 80% no incr, L SB 70% no increase. MMT L quad 4/5 L knee ext 4/5 L glute max 4-/5 L hip abd3/5 and pain. Heel lift/toe lift 5/5 B.     Therapist Problem List:  1. Decr lumbar mobility flex and ext with decr sx after extension ex  2. L hip mm weakness  3. Decr L hip mobility  4. Decr L LE proprioception/SL    .         Type of problem:  Lumbar.    This is a new condition.  Condition occurred with:  A jump, repetition/overuse and a fall/slip (lifting, running, snowboarding?  unsure).  Where condition occurred: at home, during recreation/sport and in the community.  Patient reports pain:  Central lumbar spine and lumbar spine left.  Pain is described as aching and stabbing and is constant.  Pain radiates to:  Gluteals left and thigh left. Pain is worse in the P.M. and worse during the night.  Since onset symptoms are gradually improving.  Associated symptoms:  Loss of strength and loss of motion/stiffness. Symptoms are exacerbated by bending, lifting, sitting and lying down  and relieved by activity/movement, heat and rest.  Special tests included:  X-ray (negative for fractures or abnormalities).    Restrictions due to condition include:  Working in normal job without restrictions.                          Objective:  System         Lumbar/SI Evaluation  ROM:  Arom wnl lumbar:  Lumbar AROM RFIS to mid lower leg slight incr, NOÉ  20% slight increase, R SB 80% no incr, L SB 70% no increase.      Lumbar Myotomes:    T12-L3 (Hip Flex):  Left: 4      L2-4 (Quads):  Left:  4      L4 (Ankle DF):  Left:  4 and 5      L5 (Great Toe Ext): Left: 5      S1 (Toe Raise):  Left: 5      Lumbar DTR's:  not assessed        Lumbar Dermtomes:  normal                Neural Tension/Mobility:  Lumbar:  Normal        Lumbar Palpation:    Tenderness present at Left:    Quadratus Lumborum; Erector Spinae; Piriformis and Gluteus Medius    Functional Tests:  Core strength and proprioception lumbar: lumbar extensor substitution for glute activities.          Spinal Segmental Conclusions:     Level: Hypo noted at L3 and L5                                        Hip Evaluation  HIP AROM:    Flexion: Left: 95    Right:  110    Extension: Left: 5    Right:  10              Hip PROM:            Internal Rotation: Left: 30    Right: 35  External Rotation: Left: 35    Right: 45              Hip Strength:    Flexion:   Left: 4/5   Pain:  Right: 5/5   Pain:                    Extension:  Left: 4-/5  Pain:Right: 5-/5    Pain:    Abduction:  Left: 3+/5     Pain:Right: 5-/5    Pain:  Adduction:  Left: 4/5    Pain:Right: 5/5   Pain:      Knee Flexion:  Left: 4+/5   Pain:Right: 5/5   Pain:  Knee Extension:  Left: 4/5   Pain:Right: 5/5    Pain:        Hip Special Testing:    Left hip positive for the following special tests:  Fadir/Labrum                   General     ROS    Assessment/Plan:    Patient is a 32 year old female with lumbar and left side hip complaints.    Patient has the following significant findings with corresponding treatment plan.                Diagnosis 1:  LBP with L radic, L hip impingment  Pain -  hot/cold therapy manual therapy NMR therapeutic ctivities and therapeutic exercise  Decreased ROM/flexibility - manual therapy and therapeutic exercise  Decreased joint mobility - manual therapy and therapeutic exercise  Decreased strength - therapeutic exercise and  therapeutic activities  Impaired balance - neuro re-education and therapeutic activities  Decreased proprioception - neuro re-education and therapeutic activities  Impaired muscle performance - neuro re-education  Decreased function - therapeutic activities  Impaired posture - neuro re-education      Cumulative Therapy Evaluation is: Low complexity.    Previous and current functional limitations:  (See Goal Flow Sheet for this information)    Short term and Long term goals: (See Goal Flow Sheet for this information)     Communication ability:  Patient appears to be able to clearly communicate and understand verbal and written communication and follow directions correctly.  Treatment Explanation - The following has been discussed with the patient:   RX ordered/plan of care  Anticipated outcomes  Possible risks and side effects  This patient would benefit from PT intervention to resume normal activities.   Rehab potential is excellent.    Frequency:  1 X a month, once daily  Duration:  for 3 months  Discharge Plan:  Achieve all LTG.  Independent in home treatment program.  Reach maximal therapeutic benefit.    Please refer to the daily flowsheet for treatment today, total treatment time and time spent performing 1:1 timed codes.

## 2023-05-03 ENCOUNTER — E-VISIT (OUTPATIENT)
Dept: INTERNAL MEDICINE | Facility: CLINIC | Age: 33
End: 2023-05-03
Payer: COMMERCIAL

## 2023-05-03 DIAGNOSIS — B96.89 ACUTE BACTERIAL SINUSITIS: Primary | ICD-10-CM

## 2023-05-03 DIAGNOSIS — J01.90 ACUTE BACTERIAL SINUSITIS: Primary | ICD-10-CM

## 2023-05-03 PROCEDURE — 99421 OL DIG E/M SVC 5-10 MIN: CPT | Performed by: INTERNAL MEDICINE

## 2023-05-03 NOTE — PATIENT INSTRUCTIONS
Sinusitis (Antibiotic Treatment)    The sinuses are air-filled spaces within the bones of the face. They connect to the inside of the nose. Sinusitis is an inflammation of the tissue that lines the sinuses. Sinusitis can occur during a cold. It can also happen due to allergies to pollens and other particles in the air. Sinusitis can cause symptoms of sinus congestion and a feeling of fullness. A sinus infection causes fever, headache, and facial pain. There is often green or yellow fluid draining from the nose or into the back of the throat (post-nasal drip). You have been given antibiotics to treat this condition.   Home care    Take the full course of antibiotics as instructed. Don't stop taking them, even when you feel better.    Drink plenty of water, hot tea, and other liquids as directed by the healthcare provider. This may help thin nasal mucus. It also may help your sinuses drain fluids.    Heat may help soothe painful areas of your face. Use a towel soaked in hot water. Or,  the shower and direct the warm spray onto your face. Using a vaporizer along with a menthol rub at night may also help soothe symptoms.     An expectorant with guaifenesin may help thin nasal mucus and help your sinuses drain fluids. Talk with your provider or pharmacists before taking an over-the-counter (OTC) medicine if you have any questions about it or its side effects..    You can use an OTC decongestant, unless a similar medicine was prescribed to you. Nasal sprays work the fastest. Use one that contains phenylephrine or oxymetazoline. First blow your nose gently. Then use the spray. Don't use these medicines more often than directed on the label. If you do, your symptoms may get worse. You may also take pills that contain pseudoephedrine. Don t use products that combine multiple medicines. This is because side effects may be increased. Read labels. You can also ask the pharmacist for help. (People with high blood  pressure should not use decongestants. They can raise blood pressure.) Talk with your provider or pharmacist if you have any questions about the medicine..    OTC antihistamines may help if allergies contributed to your sinusitis. Talk with your provider or pharmacist if you have any questions about the medicine.    Nasal rinses or irrigation may help symptoms. It's very important to use these products only as directed. Use sterile water or sterile saline solution and not tap water. Tap water may contain germs that can cause infection in the brain. Don't rinse with excess pressure. this may spread the infection to other areas in your sinuses or head. Ask your healthcare provider or pharmacist if you have questions about using these products.    Use acetaminophen, naproxen, or ibuprofen to control pain, unless another pain medicine was prescribed to you. Talk with your healthcare provider before using these medicines if you have chronic liver or kidney disease or ever had a stomach ulcer. Never give aspirin to anyone under age 18 without first talking to their healthcare provider. It may cause severe liver damage.    Don't smoke. This can make symptoms worse.    Follow-up care  Follow up with your healthcare provider as advised.   When to seek medical advice  Call your healthcare provider if any of these occur:     Facial pain or headache that gets worse    Symptoms don't go away in 10 days    Fever of 100.4 F (38 C) or higher, or as directed by your healthcare provider  Call 911  Call 911 if any of these occur:     Seizure    Trouble breathing    Feeling dizzy or faint    Fingernails, skin, or lips look blue, purple, or gray    Severe headache that doesn't go away    Stiff neck    Unusual drowsiness or confusion    Vision problems such as blurred or double vision    Swelling of your forehead or eyelids  Prevention  Here are steps you can take to help prevent an infection:     Keep good hand washing habits.    Don t  have close contact with people who have sore throats, colds, or other upper respiratory infections.    Don t smoke, and stay away from secondhand smoke.    Stay up to date with all of your vaccines.  [employer] last reviewed this educational content on 1/1/2022 2000-2022 [employer]. All rights reserved. This information is not intended as a substitute for professional medical care. Always follow your healthcare professional's instructions.        Dear Miracle Robertson    After reviewing your responses, I've been able to diagnose you with Acute bacterial sinusitis.      Based on your responses and diagnosis, I have prescribed   Orders Placed This Encounter     amoxicillin-clavulanate (AUGMENTIN) 875-125 MG tablet    to treat your symptoms. I have sent this to your pharmacy.?     It is also important to stay well hydrated, get lots of rest and take over-the-counter decongestants,?tylenol?or ibuprofen if you?are able to?take those medications per your primary care provider to help relieve discomfort.?     It is important that you take?all of?your prescribed medication even if your symptoms are improving after a few doses.? Taking?all of?your medicine helps prevent the symptoms from returning.?     If your symptoms worsen, you develop severe headache, vomiting, high fever (>102), or are not improving in 7 days, please contact your primary care provider for an appointment or visit any of our convenient Walk-in Care or Urgent Care Centers to be seen which can be found on our website?here.?     Thanks again for choosing?us?as your health care partner,?   ?  Margo Mane MD?

## 2023-05-12 ENCOUNTER — THERAPY VISIT (OUTPATIENT)
Dept: PHYSICAL THERAPY | Facility: CLINIC | Age: 33
End: 2023-05-12
Payer: COMMERCIAL

## 2023-05-12 DIAGNOSIS — M25.559 HIP PAIN, UNSPECIFIED LATERALITY: ICD-10-CM

## 2023-05-12 DIAGNOSIS — M54.50 LOW BACK PAIN, UNSPECIFIED BACK PAIN LATERALITY, UNSPECIFIED CHRONICITY, UNSPECIFIED WHETHER SCIATICA PRESENT: Primary | ICD-10-CM

## 2023-05-12 DIAGNOSIS — R10.30 GROIN PAIN, UNSPECIFIED LATERALITY: ICD-10-CM

## 2023-05-12 PROCEDURE — 97110 THERAPEUTIC EXERCISES: CPT | Mod: GP | Performed by: PHYSICAL THERAPIST

## 2023-05-12 PROCEDURE — 97140 MANUAL THERAPY 1/> REGIONS: CPT | Mod: GP | Performed by: PHYSICAL THERAPIST

## 2023-05-25 ENCOUNTER — MYC MEDICAL ADVICE (OUTPATIENT)
Dept: OPTOMETRY | Facility: CLINIC | Age: 33
End: 2023-05-25
Payer: COMMERCIAL

## 2023-05-26 ENCOUNTER — OFFICE VISIT (OUTPATIENT)
Dept: OPHTHALMOLOGY | Facility: CLINIC | Age: 33
End: 2023-05-26
Payer: COMMERCIAL

## 2023-05-26 DIAGNOSIS — H20.12 CHRONIC ANTERIOR UVEITIS, LEFT: Primary | ICD-10-CM

## 2023-05-26 PROCEDURE — 92012 INTRM OPH EXAM EST PATIENT: CPT | Performed by: OPTOMETRIST

## 2023-05-26 RX ORDER — PREDNISOLONE ACETATE 10 MG/ML
1 SUSPENSION/ DROPS OPHTHALMIC 4 TIMES DAILY
Qty: 15 ML | Refills: 3 | Status: SHIPPED | OUTPATIENT
Start: 2023-05-26 | End: 2023-08-03

## 2023-05-26 ASSESSMENT — TONOMETRY
OD_IOP_MMHG: 11
IOP_METHOD: ICARE
OS_IOP_MMHG: 9

## 2023-05-26 ASSESSMENT — VISUAL ACUITY
METHOD: SNELLEN - LINEAR
OD_CC: 20/20
OS_CC: 20/20
CORRECTION_TYPE: GLASSES

## 2023-05-26 ASSESSMENT — SLIT LAMP EXAM - LIDS
COMMENTS: NORMAL
COMMENTS: NORMAL

## 2023-05-26 ASSESSMENT — EXTERNAL EXAM - LEFT EYE: OS_EXAM: NORMAL

## 2023-05-26 ASSESSMENT — EXTERNAL EXAM - RIGHT EYE: OD_EXAM: NORMAL

## 2023-05-26 NOTE — NURSING NOTE
Chief Complaints and History of Present Illnesses   Patient presents with     Eye Infection Left Eye       Chief Complaint(s) and History of Present Illness(es)     Eye Infection Left Eye            Laterality: left eye          Comments    Patient presents today for recurrent eye infection. Has been seen multiple times with Dr. Rhodes since 12/8/22 for left eye infection. Symptoms have come and gone over time. Last visit with Dr. Rhodes was on 4/5/23, things had been better for awhile but this past Saturday, left eye began to hurt again was red over the weekend. Pt began to use steroid eye drops (ran out of drop) prescribed by Dr. Rhodes 3 times daily. Also has been using PF Systane (ran out, hasn't used in about a week) and a bacteria eyelid wash/spray. This time around left eye seems more light sensitive than in the past. Wears daily ctls, last wore about a week ago for 4 hours.                  Padmini Bishop, COT

## 2023-06-02 ENCOUNTER — OFFICE VISIT (OUTPATIENT)
Dept: OPHTHALMOLOGY | Facility: CLINIC | Age: 33
End: 2023-06-02
Payer: COMMERCIAL

## 2023-06-02 DIAGNOSIS — H20.12 CHRONIC ANTERIOR UVEITIS, LEFT: Primary | ICD-10-CM

## 2023-06-02 DIAGNOSIS — H52.13 MYOPIA OF BOTH EYES: ICD-10-CM

## 2023-06-02 PROCEDURE — 92012 INTRM OPH EXAM EST PATIENT: CPT | Performed by: OPTOMETRIST

## 2023-06-02 ASSESSMENT — VISUAL ACUITY
OS_CC: 20/20
OS_CC+: -1
METHOD: SNELLEN - LINEAR
OD_CC: 20/20
CORRECTION_TYPE: GLASSES

## 2023-06-02 ASSESSMENT — TONOMETRY
OD_IOP_MMHG: 9
OS_IOP_MMHG: 10
IOP_METHOD: ICARE

## 2023-06-02 ASSESSMENT — SLIT LAMP EXAM - LIDS
COMMENTS: NORMAL
COMMENTS: NORMAL

## 2023-06-02 ASSESSMENT — EXTERNAL EXAM - LEFT EYE: OS_EXAM: NORMAL

## 2023-06-02 ASSESSMENT — EXTERNAL EXAM - RIGHT EYE: OD_EXAM: NORMAL

## 2023-06-02 NOTE — PROGRESS NOTES
Assessment/Plan  (H20.12) Chronic anterior uveitis, left  (primary encounter diagnosis)  Comment: No inflammation evident today  Plan: Discussed findings with patient. Ok to reduced drops to three times daily for 1 week, then twice daily for 1 week. Follow up with patient in 2 weeks. Return to clinic with new eye pain or redness.     (H52.13) Myopia of both eyes  Plan: Consider sending different daily disposable trials to patient if dryness remains a problem with current lenses. Patient will try gradually increasing lens wear time until then.     Complete documentation of historical and exam elements from today's encounter can  be found in the full encounter summary report (not reduplicated in this progress  note). I personally obtained the chief complaint(s) and history of present illness. I  confirmed and edited as necessary the review of systems, past medical/surgical  history, family history, social history, and examination findings as documented by  others; and I examined the patient myself. I personally reviewed the relevant tests,  images, and reports as documented above. I formulated and edited as necessary the  assessment and plan and discussed the findings and management plan with the  patient and family.    Brian Griffith, LANDEN

## 2023-06-02 NOTE — NURSING NOTE
Chief Complaints and History of Present Illnesses   Patient presents with     Uveitis Follow-Up       Chief Complaint(s) and History of Present Illness(es)     Uveitis Follow-Up            Laterality: left eye          Comments    Patient here for 1 week uveitis follow up. Left eye still feels pressurized but has improved since a week ago. Using Pred Forte four times daily in left eye. No Ats use although pt states she intends to start. No ctl wear since last visit                 Padmini Bishop, COT

## 2023-06-05 ENCOUNTER — THERAPY VISIT (OUTPATIENT)
Dept: PHYSICAL THERAPY | Facility: CLINIC | Age: 33
End: 2023-06-05
Payer: COMMERCIAL

## 2023-06-05 DIAGNOSIS — M25.559 HIP PAIN, UNSPECIFIED LATERALITY: ICD-10-CM

## 2023-06-05 DIAGNOSIS — M54.50 LOW BACK PAIN, UNSPECIFIED BACK PAIN LATERALITY, UNSPECIFIED CHRONICITY, UNSPECIFIED WHETHER SCIATICA PRESENT: Primary | ICD-10-CM

## 2023-06-05 PROCEDURE — 97110 THERAPEUTIC EXERCISES: CPT | Mod: GP | Performed by: PHYSICAL THERAPIST

## 2023-06-05 PROCEDURE — 97140 MANUAL THERAPY 1/> REGIONS: CPT | Mod: GP | Performed by: PHYSICAL THERAPIST

## 2023-06-29 ENCOUNTER — OFFICE VISIT (OUTPATIENT)
Dept: OPTOMETRY | Facility: CLINIC | Age: 33
End: 2023-06-29
Payer: COMMERCIAL

## 2023-06-29 DIAGNOSIS — H20.11 CHRONIC ANTERIOR UVEITIS, RIGHT: Primary | ICD-10-CM

## 2023-06-29 DIAGNOSIS — H10.45 CHRONIC ALLERGIC CONJUNCTIVITIS: ICD-10-CM

## 2023-06-29 ASSESSMENT — REFRACTION_CURRENTRX
OD_SPHERE: -3.75
OS_SPHERE: -3.75
OD_SPHERE: -3.75
OS_SPHERE: -3.75
OD_BRAND: ALCON DAILIES TOTAL 1 BC 8.5, D 14.1
OD_BRAND: ALCON PRECISION 1 DAY  BC 8.2 D 14.2
OS_BRAND: ALCON DAILIES TOTAL 1 BC 8.5, D 14.1
OS_BRAND: ALCON PRECISION 1 DAY  BC 8.2 D 14.2

## 2023-06-29 ASSESSMENT — VISUAL ACUITY
OD_CC: 20/20
OS_CC: 20/20
OS_CC+: -1
CORRECTION_TYPE: GLASSES
METHOD: SNELLEN - LINEAR

## 2023-06-29 ASSESSMENT — TONOMETRY
OS_IOP_MMHG: 10
IOP_METHOD: ICARE
OD_IOP_MMHG: 11

## 2023-06-29 ASSESSMENT — CONF VISUAL FIELD
OD_NORMAL: 1
OS_SUPERIOR_TEMPORAL_RESTRICTION: 0
OD_SUPERIOR_TEMPORAL_RESTRICTION: 0
OS_INFERIOR_TEMPORAL_RESTRICTION: 0
OS_NORMAL: 1
OD_SUPERIOR_NASAL_RESTRICTION: 0
OD_INFERIOR_TEMPORAL_RESTRICTION: 0
OS_INFERIOR_NASAL_RESTRICTION: 0
OD_INFERIOR_NASAL_RESTRICTION: 0
OS_SUPERIOR_NASAL_RESTRICTION: 0

## 2023-06-29 NOTE — PROGRESS NOTES
Assessment/Plan  (H20.11) Chronic anterior uveitis, right  (primary encounter diagnosis)  Comment: Resolved. Patient taking PF twice daily. First known episode.   Plan: Continue PF once daily through next week, then ok to taper to every other day for 1 week then stop. Return to clinic with any recurrence of eye pain/light sensitivity.     (H10.45) Chronic allergic conjunctivitis  Plan: Consider using Pataday in the morning in both eyes. Extra strength sample dispensed to patient to try using. Continue with daily disposable CLs- samples of Precision 1 dailies and Total 1 dailies dispensed for her to try. Ok to update if she likes these better than her habitual contacts.       Complete documentation of historical and exam elements from today's encounter can  be found in the full encounter summary report (not reduplicated in this progress  note). I personally obtained the chief complaint(s) and history of present illness. I  confirmed and edited as necessary the review of systems, past medical/surgical  history, family history, social history, and examination findings as documented by  others; and I examined the patient myself. I personally reviewed the relevant tests,  images, and reports as documented above. I formulated and edited as necessary the  assessment and plan and discussed the findings and management plan with the  patient and family.    Brian Griffith OD

## 2023-07-06 ENCOUNTER — THERAPY VISIT (OUTPATIENT)
Dept: PHYSICAL THERAPY | Facility: CLINIC | Age: 33
End: 2023-07-06
Payer: COMMERCIAL

## 2023-07-06 DIAGNOSIS — M54.50 LOW BACK PAIN, UNSPECIFIED BACK PAIN LATERALITY, UNSPECIFIED CHRONICITY, UNSPECIFIED WHETHER SCIATICA PRESENT: Primary | ICD-10-CM

## 2023-07-06 DIAGNOSIS — M25.559 HIP PAIN, UNSPECIFIED LATERALITY: ICD-10-CM

## 2023-07-06 PROCEDURE — 97140 MANUAL THERAPY 1/> REGIONS: CPT | Mod: GP | Performed by: PHYSICAL THERAPIST

## 2023-07-06 PROCEDURE — 97110 THERAPEUTIC EXERCISES: CPT | Mod: GP | Performed by: PHYSICAL THERAPIST

## 2023-07-07 ENCOUNTER — TELEPHONE (OUTPATIENT)
Dept: OPHTHALMOLOGY | Facility: CLINIC | Age: 33
End: 2023-07-07
Payer: COMMERCIAL

## 2023-07-07 NOTE — TELEPHONE ENCOUNTER
Scheduled for 7/18 @ 3 pm     Miracle could not do mornings on 7/13    Rae Wise Communication Facilitator on 7/7/2023 at 4:16 PM

## 2023-08-03 ENCOUNTER — OFFICE VISIT (OUTPATIENT)
Dept: INTERNAL MEDICINE | Facility: CLINIC | Age: 33
End: 2023-08-03
Payer: COMMERCIAL

## 2023-08-03 VITALS
OXYGEN SATURATION: 97 % | WEIGHT: 156.1 LBS | SYSTOLIC BLOOD PRESSURE: 114 MMHG | BODY MASS INDEX: 26.01 KG/M2 | DIASTOLIC BLOOD PRESSURE: 66 MMHG | HEIGHT: 65 IN | RESPIRATION RATE: 18 BRPM | TEMPERATURE: 98.6 F | HEART RATE: 87 BPM

## 2023-08-03 DIAGNOSIS — R68.89 EAR SYMPTOM: ICD-10-CM

## 2023-08-03 DIAGNOSIS — F33.42 RECURRENT MAJOR DEPRESSIVE DISORDER, IN FULL REMISSION (H): ICD-10-CM

## 2023-08-03 DIAGNOSIS — G43.009 MIGRAINE WITHOUT AURA AND WITHOUT STATUS MIGRAINOSUS, NOT INTRACTABLE: ICD-10-CM

## 2023-08-03 DIAGNOSIS — F41.1 GAD (GENERALIZED ANXIETY DISORDER): Primary | ICD-10-CM

## 2023-08-03 DIAGNOSIS — F41.8 SITUATIONAL ANXIETY: ICD-10-CM

## 2023-08-03 PROCEDURE — 99214 OFFICE O/P EST MOD 30 MIN: CPT | Performed by: INTERNAL MEDICINE

## 2023-08-03 RX ORDER — ALPRAZOLAM 0.5 MG
0.5 TABLET ORAL 3 TIMES DAILY PRN
Qty: 20 TABLET | Refills: 0 | Status: SHIPPED | OUTPATIENT
Start: 2023-08-03

## 2023-08-03 RX ORDER — VENLAFAXINE HYDROCHLORIDE 75 MG/1
75 CAPSULE, EXTENDED RELEASE ORAL EVERY MORNING
COMMUNITY
Start: 2023-07-27 | End: 2024-04-03

## 2023-08-03 ASSESSMENT — ANXIETY QUESTIONNAIRES
3. WORRYING TOO MUCH ABOUT DIFFERENT THINGS: SEVERAL DAYS
7. FEELING AFRAID AS IF SOMETHING AWFUL MIGHT HAPPEN: SEVERAL DAYS
6. BECOMING EASILY ANNOYED OR IRRITABLE: NOT AT ALL
4. TROUBLE RELAXING: NOT AT ALL
IF YOU CHECKED OFF ANY PROBLEMS ON THIS QUESTIONNAIRE, HOW DIFFICULT HAVE THESE PROBLEMS MADE IT FOR YOU TO DO YOUR WORK, TAKE CARE OF THINGS AT HOME, OR GET ALONG WITH OTHER PEOPLE: NOT DIFFICULT AT ALL
GAD7 TOTAL SCORE: 4
GAD7 TOTAL SCORE: 4
5. BEING SO RESTLESS THAT IT IS HARD TO SIT STILL: NOT AT ALL
2. NOT BEING ABLE TO STOP OR CONTROL WORRYING: SEVERAL DAYS
1. FEELING NERVOUS, ANXIOUS, OR ON EDGE: SEVERAL DAYS

## 2023-08-03 NOTE — PATIENT INSTRUCTIONS
Give Effexor a chance - it is not giving you norepinephrine it is keep your own norepinephrine levels at a higher and better level to manage stressors.     ---    Consider taking birth control continuously (skipping placebo weeks) - this may help with hormone related migraines.    ---    Rubbing alcohol in and out of ears after swimming.    ---    Consider prescription abortive medication for migraines.     ---    Xanax prescribed.    This is a benzodiazepine. It is judgement and coordination impairing and sedating.    Do not drive or operate heavy machinery while taking this medication.     Do not make important decisions or take care of others while taking this medication.     Do not use with alcohol or other sedatives while taking this medication.     Benzodiazepines have the potential to be habit-forming/addicting.     Please use sparingly for severe anxiety only.

## 2023-08-03 NOTE — PROGRESS NOTES
ASSESSMENT/PLAN                                                      (F41.1) ODETTE (generalized anxiety disorder)  (primary encounter diagnosis)  (F33.42) Recurrent major depressive disorder, in full remission (H)  Comment: mood was reasonably well controlled on Lexapro, but she did experience some fatigue with it.  Plan: agree with trial of Effexor XR; if continued difficulty sleeping and/or no improvement in fatigue and/or mood is less well controlled, can always go back to Lexapro 10 mg daily.    (F41.8) Situational anxiety  Plan: Xanax sparingly as needed for acute anxiety related to upcoming travel    (R68.89) Ear symptom  Comment: suspect mild eustachian tube dysfunction and possibly some water in her ear after swimming.  Plan: continue OTC oral antihistamines daily and consistently during allergy season - this will decrease mucus production which will decrease the incidence and severity of eustachian tube dysfunction; patient encouraged to use several drops rubbing alcohol in her ear after swimming to help with water evaporation.    (G43.009) Migraine without aura and without status migrainosus, not intractable  Comment: patient's symptoms are suspicious for migraines; TRIAL of abortive medication or continuous OCP use deferred for now.  Plan: patient will contact me if interested in these at a future date    Margo Mane MD   [address] [address]  T: [phone], F: [phone]    GEOVANNA Robertson is a very pleasant 33 year old female who presents for follow-up:    Patient is currently switching from Lexapro 10 mg daily to Effexor XR 75 mg daily.  Patient noticed some fatigue while taking Lexapro so Effexor XR was suggested by a psychiatrist to replace Lexapro. Patient has noticed that she has some difficulty falling asleep since starting Effexor XR. Of note, patient's mood was reasonably well  "controlled on Lexapro 10 mg daily.    Patient requests medication to help with acute anxiety for upcoming travel. She is going to [place] with her significant other and his friends for a wedding. She has a history of flight anxiety and will be on longer flights than she has never been on before.  She is also anxious about being in unfamiliar countries and situations.    Patient reports ear symptoms. Ears feel full after swimming, in spite of using ear buds while swimming. She also notices occasional crackling in her ears. Of note her allergies have been acting up as of late, prompting her to restart OTC oral antihistamines.    Finally, patient reports occasional severe/debilitating headaches that last several hours.  Associated photophobia. Headaches seem to be related to her periods. No preceding aura. She is on a monthly OCP.  No treatments tried to date.    OBJECTIVE                                                      /66   Pulse 87   Temp 98.6  F (37  C) (Temporal)   Resp 18   Ht 1.651 m (5' 5\")   Wt 70.8 kg (156 lb 1.6 oz)   LMP 08/02/2023   SpO2 97%   BMI 25.98 kg/m    Constitutional: well-appearing  Ears: normal pinnae, EACs, and TMs    ---    (Note documentation was completed, in part, with [employer] voice-recognition software. Documentation was reviewed, but some grammatical, spelling, and word errors may remain.)      "

## 2023-12-10 ENCOUNTER — OFFICE VISIT (OUTPATIENT)
Dept: URGENT CARE | Facility: URGENT CARE | Age: 33
End: 2023-12-10
Payer: COMMERCIAL

## 2023-12-10 VITALS
DIASTOLIC BLOOD PRESSURE: 70 MMHG | RESPIRATION RATE: 16 BRPM | TEMPERATURE: 99.2 F | SYSTOLIC BLOOD PRESSURE: 115 MMHG | OXYGEN SATURATION: 98 % | HEART RATE: 91 BPM

## 2023-12-10 DIAGNOSIS — J30.89 NON-SEASONAL ALLERGIC RHINITIS, UNSPECIFIED TRIGGER: ICD-10-CM

## 2023-12-10 DIAGNOSIS — R05.1 ACUTE COUGH: ICD-10-CM

## 2023-12-10 DIAGNOSIS — J04.0 LARYNGITIS: ICD-10-CM

## 2023-12-10 DIAGNOSIS — J01.90 ACUTE SINUSITIS WITH COEXISTING CONDITION REQUIRING PROPHYLACTIC TREATMENT: Primary | ICD-10-CM

## 2023-12-10 PROCEDURE — 99214 OFFICE O/P EST MOD 30 MIN: CPT | Performed by: PHYSICIAN ASSISTANT

## 2023-12-10 RX ORDER — FLUTICASONE PROPIONATE 50 MCG
2 SPRAY, SUSPENSION (ML) NASAL DAILY
COMMUNITY
Start: 2023-10-07 | End: 2024-05-14

## 2023-12-10 RX ORDER — LEVOCETIRIZINE DIHYDROCHLORIDE 5 MG/1
5 TABLET, FILM COATED ORAL EVERY EVENING
Qty: 30 TABLET | Refills: 1 | Status: SHIPPED | OUTPATIENT
Start: 2023-12-10

## 2023-12-10 RX ORDER — PREDNISONE 20 MG/1
20 TABLET ORAL DAILY
Qty: 5 TABLET | Refills: 0 | Status: SHIPPED | OUTPATIENT
Start: 2023-12-10 | End: 2023-12-15

## 2023-12-10 RX ORDER — PROPRANOLOL HYDROCHLORIDE 10 MG/1
10 TABLET ORAL 2 TIMES DAILY PRN
COMMUNITY
Start: 2023-07-27 | End: 2024-02-14

## 2023-12-10 RX ORDER — CODEINE PHOSPHATE AND GUAIFENESIN 10; 100 MG/5ML; MG/5ML
1-2 SOLUTION ORAL EVERY 4 HOURS PRN
Qty: 118 ML | Refills: 0 | Status: SHIPPED | OUTPATIENT
Start: 2023-12-10 | End: 2024-04-02

## 2023-12-10 RX ORDER — CEFUROXIME AXETIL 500 MG/1
500 TABLET ORAL 2 TIMES DAILY
Qty: 20 TABLET | Refills: 0 | Status: SHIPPED | OUTPATIENT
Start: 2023-12-10 | End: 2023-12-20

## 2023-12-10 NOTE — PATIENT INSTRUCTIONS
(J01.90) Acute sinusitis with coexisting condition requiring prophylactic treatment  (primary encounter diagnosis)  Comment:   Plan: cefuroxime (CEFTIN) 500 MG tablet        Saline nasal spray    (J30.89) Non-seasonal allergic rhinitis, unspecified trigger  Comment:   Plan: levocetirizine (XYZAL) 5 MG tablet, predniSONE         (DELTASONE) 20 MG tablet        Start xyzal in a couple of days and stay on it for 2-3 weeks     (J04.0) Laryngitis  Comment: secondary to post nasal drip and strain  Plan: predniSONE (DELTASONE) 20 MG tablet        Salt water gargles.      (R05.1) Acute cough  Comment:   Plan: guaiFENesin-codeine (ROBITUSSIN AC) 100-10         MG/5ML solution

## 2023-12-10 NOTE — PROGRESS NOTES
Patient presents with:  Cough: Dry cough, lost voice/hoarseness X 6 days    (J01.90) Acute sinusitis with coexisting condition requiring prophylactic treatment  (primary encounter diagnosis)  Comment:   Plan: cefuroxime (CEFTIN) 500 MG tablet        Saline nasal spray    (J30.89) Non-seasonal allergic rhinitis, unspecified trigger  Comment:   Plan: levocetirizine (XYZAL) 5 MG tablet, predniSONE         (DELTASONE) 20 MG tablet        Start xyzal in a couple of days and stay on it for 2-3 weeks     (J04.0) Laryngitis  Comment: secondary to post nasal drip and strain  Plan: predniSONE (DELTASONE) 20 MG tablet        Salt water gargles.      (R05.1) Acute cough  Comment:   Plan: guaiFENesin-codeine (ROBITUSSIN AC) 100-10         MG/5ML solution              At the end of the encounter, I discussed results, diagnosis, medications. Discussed red flags for immediate return to clinic/ER, as well as indications for follow up if no improvement. Patient understood and agreed to plan. Patient was stable for discharge     If not improving or if condition worsens, follow up with your Primary Care Provider            SUBJECTIVE:   Miracle Robertson is a 33 year old female who presents today with sinus congestion and pressure with cough and bilateral ear pressure for a week now with fevers.   Drainage is purulent.  Lost voice a couple of days ago.        Patient Active Problem List   Diagnosis    ODETTE (generalized anxiety disorder)    MDD (major depressive disorder)         Past Medical History:   Diagnosis Date    ODETTE (generalized anxiety disorder)     MDD (major depressive disorder)          Current Outpatient Medications   Medication Sig Dispense Refill    Multiple Vitamins-Iron (DAILY-KAI/IRON/BETA-CAROTENE) TABS TAKE 1 TABLET BY MOUTH DAILY. (Patient not taking: Reported on 10/19/2020) 30 tablet 7     Social History     Tobacco Use    Smoking status: Never Smoker    Smokeless tobacco: Never Used   Substance Use Topics     Alcohol use: Not on file     Family History   Problem Relation Age of Onset    Diabetes Mother     Diabetes Father          ROS:    10 point ROS of systems including Constitutional, Eyes, Respiratory, Cardiovascular, Gastroenterology, Genitourinary, Integumentary, Muscularskeletal, Psychiatric ,neurological were all negative except for pertinent positives noted in my HPI       OBJECTIVE:  /70   Pulse 91   Temp 99.2  F (37.3  C) (Tympanic)   Resp 16   SpO2 98%   Physical Exam:  GENERAL APPEARANCE: healthy, alert and no distress  EYES: EOMI,  PERRL, conjunctiva clear  HENT: ear canals and TM's normal.  Nose and mouth without ulcers, erythema or lesions with post nasal drip  HENT: nasal turbinates boggy with bluish hue and rhinorrhea yellow  NECK: supple, nontender, no lymphadenopathy  RESP: lungs clear to auscultation - no rales, rhonchi or wheezes  CV: regular rates and rhythm, normal S1 S2, no murmur noted  NEURO: Normal strength and tone, sensory exam grossly normal,  normal speech and mentation  SKIN: no suspicious lesions or rashes

## 2024-02-01 ENCOUNTER — OFFICE VISIT (OUTPATIENT)
Dept: OPTOMETRY | Facility: CLINIC | Age: 34
End: 2024-02-01
Payer: COMMERCIAL

## 2024-02-01 DIAGNOSIS — H20.12 CHRONIC ANTERIOR UVEITIS, LEFT: Primary | ICD-10-CM

## 2024-02-01 DIAGNOSIS — H20.9 UVEITIS: ICD-10-CM

## 2024-02-01 RX ORDER — PREDNISOLONE ACETATE 10 MG/ML
1 SUSPENSION/ DROPS OPHTHALMIC
Qty: 15 ML | Refills: 1 | Status: SHIPPED | OUTPATIENT
Start: 2024-02-01 | End: 2024-02-14

## 2024-02-01 ASSESSMENT — REFRACTION_WEARINGRX
OD_CYLINDER: +0.25
OS_AXIS: 100
OD_SPHERE: -4.50
OD_AXIS: 065
OS_SPHERE: -4.25
OS_CYLINDER: +0.50

## 2024-02-01 ASSESSMENT — TONOMETRY
IOP_METHOD: ICARE
OS_IOP_MMHG: 14
OD_IOP_MMHG: 12

## 2024-02-01 ASSESSMENT — VISUAL ACUITY
OS_CC+: -1
METHOD: SNELLEN - LINEAR
OS_CC: 20/25
CORRECTION_TYPE: GLASSES
OD_CC: 20/25

## 2024-02-01 ASSESSMENT — EXTERNAL EXAM - RIGHT EYE: OD_EXAM: NORMAL

## 2024-02-01 ASSESSMENT — EXTERNAL EXAM - LEFT EYE: OS_EXAM: NORMAL

## 2024-02-01 ASSESSMENT — SLIT LAMP EXAM - LIDS
COMMENTS: NORMAL
COMMENTS: NORMAL

## 2024-02-01 NOTE — NURSING NOTE
Chief Complaints and History of Present Illnesses   Patient presents with    Uveitis Evaluation     Pt here for Uveitis flare left eye.      Chief Complaint(s) and History of Present Illness(es)       Uveitis Evaluation              Laterality: left eye    Comments: Pt here for Uveitis flare left eye.               Comments    Pt notes red irritated left eye that is sensitive to light and feels pressure that started last Thursday January 25th. Pt notes improvement since starting prednisolone she was prescribed last summer.   She does still feel pressure.   Pt using:  Prednisolone QID left eye ( prescribed last summer)   KARINA Lai on 2/1/2024 at 11:11 AM

## 2024-02-01 NOTE — PROGRESS NOTES
Assessment/Plan  (H20.12) Chronic anterior uveitis, left  (primary encounter diagnosis)  Comment: First episode last summer. Asymptomatic since that was treated.   Plan: Lysozyme, serum, prednisoLONE acetate (PRED FORTE) 1 % ophthalmic suspension        Discussed findings with patient. Labs entered to try and determine source. Begin using PredForte every 4 hours while awake. Patient is to follow up with uveitis specialist in about 1 week to monitor for progress.     (H20.9) Uveitis  Plan: HLA-B27 Typing, Basic metabolic panel, Angiotensin converting enzyme, CBC with platelets differential, XR Chest 2 Views, Treponema Abs w Reflex to RPR and Titer, Quantiferon TB Gold Plus, Lyme Disease Toshia with reflex to WB Serum        See above note. Recurrent uveitis since last summer.       Complete documentation of historical and exam elements from today's encounter can  be found in the full encounter summary report (not reduplicated in this progress  note). I personally obtained the chief complaint(s) and history of present illness. I  confirmed and edited as necessary the review of systems, past medical/surgical  history, family history, social history, and examination findings as documented by  others; and I examined the patient myself. I personally reviewed the relevant tests,  images, and reports as documented above. I formulated and edited as necessary the  assessment and plan and discussed the findings and management plan with the  patient and family.    Brian Griffith OD

## 2024-02-02 ENCOUNTER — ANCILLARY PROCEDURE (OUTPATIENT)
Dept: GENERAL RADIOLOGY | Facility: CLINIC | Age: 34
End: 2024-02-02
Attending: OPTOMETRIST
Payer: COMMERCIAL

## 2024-02-02 ENCOUNTER — LAB (OUTPATIENT)
Dept: LAB | Facility: CLINIC | Age: 34
End: 2024-02-02
Payer: COMMERCIAL

## 2024-02-02 DIAGNOSIS — H20.9 UVEITIS: ICD-10-CM

## 2024-02-02 DIAGNOSIS — H20.12 CHRONIC ANTERIOR UVEITIS, LEFT: ICD-10-CM

## 2024-02-02 LAB
B BURGDOR IGG+IGM SER QL: 0.14
BASOPHILS # BLD AUTO: 0 10E3/UL (ref 0–0.2)
BASOPHILS NFR BLD AUTO: 1 %
EOSINOPHIL # BLD AUTO: 0.1 10E3/UL (ref 0–0.7)
EOSINOPHIL NFR BLD AUTO: 2 %
ERYTHROCYTE [DISTWIDTH] IN BLOOD BY AUTOMATED COUNT: 11.9 % (ref 10–15)
HCT VFR BLD AUTO: 40.6 % (ref 35–47)
HGB BLD-MCNC: 13.4 G/DL (ref 11.7–15.7)
IMM GRANULOCYTES # BLD: 0 10E3/UL
IMM GRANULOCYTES NFR BLD: 0 %
LYMPHOCYTES # BLD AUTO: 1.8 10E3/UL (ref 0.8–5.3)
LYMPHOCYTES NFR BLD AUTO: 28 %
MCH RBC QN AUTO: 28.6 PG (ref 26.5–33)
MCHC RBC AUTO-ENTMCNC: 33 G/DL (ref 31.5–36.5)
MCV RBC AUTO: 87 FL (ref 78–100)
MONOCYTES # BLD AUTO: 0.3 10E3/UL (ref 0–1.3)
MONOCYTES NFR BLD AUTO: 5 %
NEUTROPHILS # BLD AUTO: 4.2 10E3/UL (ref 1.6–8.3)
NEUTROPHILS NFR BLD AUTO: 64 %
PLATELET # BLD AUTO: 349 10E3/UL (ref 150–450)
RBC # BLD AUTO: 4.68 10E6/UL (ref 3.8–5.2)
T PALLIDUM AB SER QL: NONREACTIVE
WBC # BLD AUTO: 6.5 10E3/UL (ref 4–11)

## 2024-02-02 PROCEDURE — 86481 TB AG RESPONSE T-CELL SUSP: CPT

## 2024-02-02 PROCEDURE — 85025 COMPLETE CBC W/AUTO DIFF WBC: CPT

## 2024-02-02 PROCEDURE — 81374 HLA I TYPING 1 ANTIGEN LR: CPT

## 2024-02-02 PROCEDURE — 85549 MURAMIDASE: CPT | Mod: 90

## 2024-02-02 PROCEDURE — 99000 SPECIMEN HANDLING OFFICE-LAB: CPT

## 2024-02-02 PROCEDURE — 80048 BASIC METABOLIC PNL TOTAL CA: CPT

## 2024-02-02 PROCEDURE — 36415 COLL VENOUS BLD VENIPUNCTURE: CPT

## 2024-02-02 PROCEDURE — 82164 ANGIOTENSIN I ENZYME TEST: CPT | Mod: 90

## 2024-02-02 PROCEDURE — 86618 LYME DISEASE ANTIBODY: CPT

## 2024-02-02 PROCEDURE — 71046 X-RAY EXAM CHEST 2 VIEWS: CPT

## 2024-02-02 PROCEDURE — 86780 TREPONEMA PALLIDUM: CPT

## 2024-02-03 LAB
ANION GAP SERPL CALCULATED.3IONS-SCNC: 9 MMOL/L (ref 7–15)
BUN SERPL-MCNC: 12.8 MG/DL (ref 6–20)
CALCIUM SERPL-MCNC: 9.1 MG/DL (ref 8.6–10)
CHLORIDE SERPL-SCNC: 104 MMOL/L (ref 98–107)
CREAT SERPL-MCNC: 1.02 MG/DL (ref 0.51–0.95)
DEPRECATED HCO3 PLAS-SCNC: 25 MMOL/L (ref 22–29)
EGFRCR SERPLBLD CKD-EPI 2021: 75 ML/MIN/1.73M2
GLUCOSE SERPL-MCNC: 81 MG/DL (ref 70–99)
POTASSIUM SERPL-SCNC: 4 MMOL/L (ref 3.4–5.3)
SODIUM SERPL-SCNC: 138 MMOL/L (ref 135–145)

## 2024-02-04 LAB — ACE SERPL-CCNC: 60 U/L

## 2024-02-05 LAB
GAMMA INTERFERON BACKGROUND BLD IA-ACNC: 0.04 IU/ML
M TB IFN-G BLD-IMP: NEGATIVE
M TB IFN-G CD4+ BCKGRND COR BLD-ACNC: 9.96 IU/ML
MITOGEN IGNF BCKGRD COR BLD-ACNC: 0 IU/ML
MITOGEN IGNF BCKGRD COR BLD-ACNC: 0.01 IU/ML
QUANTIFERON MITOGEN: 10 IU/ML
QUANTIFERON NIL TUBE: 0.04 IU/ML
QUANTIFERON TB1 TUBE: 0.05 IU/ML
QUANTIFERON TB2 TUBE: 0.04

## 2024-02-06 LAB — LYSOZYME SERPL-MCNC: 2.1 UG/ML

## 2024-02-08 LAB
B LOCUS: NORMAL
B27TEST METHOD: NORMAL

## 2024-02-14 ENCOUNTER — OFFICE VISIT (OUTPATIENT)
Dept: OPHTHALMOLOGY | Facility: CLINIC | Age: 34
End: 2024-02-14
Attending: OPHTHALMOLOGY
Payer: COMMERCIAL

## 2024-02-14 DIAGNOSIS — H20.022 RECURRENT IRITIS OF LEFT EYE: Primary | ICD-10-CM

## 2024-02-14 DIAGNOSIS — Z15.89 HLA B27 (HLA B27 POSITIVE): ICD-10-CM

## 2024-02-14 PROCEDURE — 99214 OFFICE O/P EST MOD 30 MIN: CPT | Mod: GC | Performed by: OPHTHALMOLOGY

## 2024-02-14 PROCEDURE — G0463 HOSPITAL OUTPT CLINIC VISIT: HCPCS | Performed by: OPHTHALMOLOGY

## 2024-02-14 RX ORDER — PREDNISOLONE ACETATE 10 MG/ML
SUSPENSION/ DROPS OPHTHALMIC
Qty: 15 ML | Refills: 4 | Status: SHIPPED | OUTPATIENT
Start: 2024-02-14 | End: 2024-03-15

## 2024-02-14 ASSESSMENT — VISUAL ACUITY
OD_CC+: -1
OS_CC: 20/20
METHOD: SNELLEN - LINEAR
OD_CC: 20/20
CORRECTION_TYPE: GLASSES

## 2024-02-14 ASSESSMENT — REFRACTION_WEARINGRX
OS_SPHERE: -4.25
OD_CYLINDER: +0.25
OD_SPHERE: -4.50
OS_CYLINDER: +0.50
OS_AXIS: 100
OD_AXIS: 065

## 2024-02-14 ASSESSMENT — CUP TO DISC RATIO
OS_RATIO: 0.25
OD_RATIO: 0.25

## 2024-02-14 ASSESSMENT — CONF VISUAL FIELD
OS_SUPERIOR_TEMPORAL_RESTRICTION: 0
OS_INFERIOR_TEMPORAL_RESTRICTION: 0
OD_SUPERIOR_NASAL_RESTRICTION: 0
OD_INFERIOR_NASAL_RESTRICTION: 0
OS_SUPERIOR_NASAL_RESTRICTION: 0
METHOD: COUNTING FINGERS
OS_INFERIOR_NASAL_RESTRICTION: 0
OD_NORMAL: 1
OS_NORMAL: 1
OD_INFERIOR_TEMPORAL_RESTRICTION: 0
OD_SUPERIOR_TEMPORAL_RESTRICTION: 0

## 2024-02-14 ASSESSMENT — TONOMETRY
OD_IOP_MMHG: 12
IOP_METHOD: TONOPEN
OS_IOP_MMHG: 16

## 2024-02-14 ASSESSMENT — EXTERNAL EXAM - RIGHT EYE: OD_EXAM: NORMAL

## 2024-02-14 ASSESSMENT — EXTERNAL EXAM - LEFT EYE: OS_EXAM: NORMAL

## 2024-02-14 NOTE — NURSING NOTE
Chief Complaints and History of Present Illnesses   Patient presents with    Uveitis Evaluation     Chief Complaint(s) and History of Present Illness(es)       Uveitis Evaluation              Laterality: left eye    Onset: gradual    Onset: months ago    Quality: Blurred va in the left eye more at night      Associated symptoms: photophobia (has decreased).  Negative for flashes and floaters    Treatments tried: eye drops    Pain scale: 0/10              Comments    Here for chronic anterior uveitis in the left eye dx 2022   Has a sense of pressure behind the eye  Prednisolone 4-5x day left eye   Lisette Urena COT 9:02 AM February 14, 2024                      no

## 2024-02-14 NOTE — PROGRESS NOTES
Chief Complaint/Presenting Concern: Uveitis evaluation    History of Present Illness:   Miracle Robertson is a 33 year old patient who presents for evaluation of her recurrent iritis of the left eye.     Ms. Robertson first had symptoms in the left eye symptoms in 12/8/2022 by Dr. Rhodes. She has been treated with maxitrol drops for allergic conjunctivitis without much improvement.  Patient had recurrence of symptoms on 05/2023 for which she saw Dr. Griffith who diagnosed her with anterior uveitis of the left eye and was started on prednisolone with improvement of symptoms.     More recently, Ms. Robertson had a recurrence on 01/2024 for which she saw Dr. Griffith on 02/01/2024 and was recommended to re-start prednisolone and follow up with uveitis clinic.She is currently on prednisolone 4-5x/day in the left eye she and she some pressure feeling in the left eye but no significant pain, or photophobia. Ms. Robertson also reports things are just a little fuzzy in the left eye at night  but not during the day.     Additional Ocular History:   Myopia   No history of uveitis right eye     Relevant Past Medical/Family/Social History:  Tonsillitis for 2 years from 2021 until 2022.  Anxiety diagnosed since childhood, worse over the past 4 years currently, started venlafaxine around 07/2023, previously on lexapro for about 2 years.   3. Acid Reflux since 2020 (age 17), acid reflux since she was a teenager that has improved after starting anxiety treatment.    4. There is back pain in the morning on awakening, sometimes related to lots of sitting and not so much activity the day before. Has had some back pain likely to active lifestyle. Had a hip impingement in 04/2023 had xray of hip and pelvis, lumbar spine with normal reports. Was told she had a bulging disc in her lumbar spine  5. Had a pinched nerve right upper rhomboid which may have been related to activity. Has flared a few times and usually gest better with chiropractic manipulation.    6. Sinus issues possibly related to COVID but these gradually got better. 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 Went to [facility] and [place] 08/2023 until 09/2023. Does not report eating uncooked meat.    Relevant Review of Systems:  Had a skin rash in the left forearm around the end of last month. Has a history of chancre sore that appears 3-4 times a year.  Does get rashes over the body when running outside. Does not have a rash when biking or weight lifting   Reports she has had episodes of diarrhea when she is stressed and busy.Did have a stool test which was normal  No pain with urination, no  fever or chills, no unintentional weight loss.         Laboratory Testing 02/02/2024  Abnormal: HLA-B27 +.   Normal/negative: BMP, ACE, lysozyme. CBC, syphilis, Quantiferon, Lyme,      Current eye related medications: Prednisolone 4-5x /day left eye     Retina/Uveitis Imaging:  OCT Spectralis Macula February 14, 2024  right eye: Normal foveal contour. No IRF or SRF. No outer segment loss.  left eye: Rare vitreous opacities, normal foveal contour. No IRF or SRF. No outer segment loss.     Assessment:    1. Recurrent iritis of left eye  Improving on steroid drops    2. HLA B27 (HLA B27 positive)  Which is likely the cause of inflammation     Plan/Recommendations:  Discussed findings with patient. That there is no inflammation on her left eye today, likely due to current prednisolone regimen. Discussed with her about her lab results being HLA- B27 + and its correlation with anterior uveitis.We will taper prednisolone drops and follow up soon.  Eye pressure is 12/16 this is healthy in each eye and dilated exam is healthy in each eye.   Recommend additional diagnostic testing: No additional testing   For the steroid drop in the  left eye, we will slowly taper. Please do 4x/day today. Use 1 drop 3x/day for 3 weeks (until 3/6/23). Then 1 drop 2x/day until next visit.   No drops right eye, no other treatments needed  We placed a consult to Rheumatology today to check for ankylosing spondylitis and Gastroenterology to evaluate for inflammatory or irritable bowel syndrome. They will call you to set up the appointments    RTC March 25 week tonopen, no dilation, no testing    Kuldeep Salazar MD  PGY-5 Vitreo-retina surgery Fellow  Department of Ophthalmology   [facility]    Attending Physician Attestation:  Complete documentation of historical and exam elements from today's encounter can be found in the full encounter summary report (not reduplicated in this progress note). I reviewed the chief complaint(s) and history of present illness, and  confirmed and edited as necessary the review of systems, past medical/surgical history, family history, social history, and examination findings as documented by others and the treating Resident or Fellow Physician.    I examined the patient myself, discussed the findings, reviewed all ancillary testing data and modified these results and reports along with the assessment and plan with the Treating Resident or Fellow Physician. I agree with the note as detailed above.   Noel Navas M.D.  Uveitis and Medical Retina  February 14, 2024

## 2024-02-14 NOTE — LETTER
2/14/2024       RE: Miracle Robertson  [address]  [place] [facility] [address]     Dear Colleague,    Thank you for referring your patient, Miracle Robertson, to the [facility] [place] at [facility]. Please see a copy of my visit note below.    Chief Complaint/Presenting Concern: Uveitis evaluation    History of Present Illness:   Miracle Robertson is a 33 year old patient who presents for evaluation of her recurrent iritis of the left eye.     Ms. Robertson first had symptoms in the left eye symptoms in 12/8/2022 by Dr. Rhodes. She has been treated with maxitrol drops for allergic conjunctivitis without much improvement.  Patient had recurrence of symptoms on 05/2023 for which she saw Dr. Griffith who diagnosed her with anterior uveitis of the left eye and was started on prednisolone with improvement of symptoms.     More recently, Ms. Robertson had a recurrence on 01/2024 for which she saw Dr. Griffith on 02/01/2024 and was recommended to re-start prednisolone and follow up with uveitis clinic.She is currently on prednisolone 4-5x/day in the left eye she and she some pressure feeling in the left eye but no significant pain, or photophobia. Ms. Robertson also reports things are just a little fuzzy in the left eye at night  but not during the day.     Additional Ocular History:   Myopia   No history of uveitis right eye     Relevant Past Medical/Family/Social History:  Tonsillitis for 2 years from 2021 until 2022.  Anxiety diagnosed since childhood, worse over the past 4 years currently, started venlafaxine around 07/2023, previously on lexapro for about 2 years.   3. Acid Reflux since 2020 (age 17), acid reflux since she was a teenager that has improved after starting anxiety treatment.    4. There is back pain in the morning on awakening, sometimes related to lots of sitting and not so much activity the day before. Has had some back pain likely to active lifestyle. Had  a hip impingement in 04/2023 had xray of hip and pelvis, lumbar spine with normal reports. Was told she had a bulging disc in her lumbar spine  5. Had a pinched nerve right upper rhomboid which may have been related to activity. Has flared a few times and usually gest better with chiropractic manipulation.   6. Sinus issues possibly related to COVID but these gradually got better. 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 Went to [facility] and [place] 08/2023 until 09/2023. Does not report eating uncooked meat.    Relevant Review of Systems:  Had a skin rash in the left forearm around the end of last month. Has a history of chancre sore that appears 3-4 times a year.  Does get rashes over the body when running outside. Does not have a rash when biking or weight lifting   Reports she has had episodes of diarrhea when she is stressed and busy.Did have a stool test which was normal  No pain with urination, no  fever or chills, no unintentional weight loss.         Laboratory Testing 02/02/2024  Abnormal: HLA-B27 +.   Normal/negative: BMP, ACE, lysozyme. CBC, syphilis, Quantiferon, Lyme,      Current eye related medications: Prednisolone 4-5x /day left eye     Retina/Uveitis Imaging:  OCT Spectralis Macula February 14, 2024  right eye: Normal foveal contour. No IRF or SRF. No outer segment loss.  left eye: Rare vitreous opacities, normal foveal contour. No IRF or SRF. No outer segment loss.     Assessment:    1. Recurrent iritis of left eye  Improving on steroid drops    2. HLA B27 (HLA B27 positive)  Which is likely the cause of inflammation     Plan/Recommendations:  Discussed findings with patient. That there is no inflammation on her left eye today, likely due to current prednisolone regimen. Discussed with her about her lab  results being HLA- B27 + and its correlation with anterior uveitis.We will taper prednisolone drops and follow up soon.  Eye pressure is 12/16 this is healthy in each eye and dilated exam is healthy in each eye.   Recommend additional diagnostic testing: No additional testing   For the steroid drop in the left eye, we will slowly taper. Please do 4x/day today. Use 1 drop 3x/day for 3 weeks (until 3/6/23). Then 1 drop 2x/day until next visit.   No drops right eye, no other treatments needed  We placed a consult to Rheumatology today to check for ankylosing spondylitis and Gastroenterology to evaluate for inflammatory or irritable bowel syndrome. They will call you to set up the appointments    RTC March 25 week tonopen, no dilation, no testing    Kuldeep Salazar MD  PGY-5 Vitreo-retina surgery Fellow  Department of Ophthalmology   [facility]    Attending Physician Attestation:  Complete documentation of historical and exam elements from today's encounter can be found in the full encounter summary report (not reduplicated in this progress note). I reviewed the chief complaint(s) and history of present illness, and  confirmed and edited as necessary the review of systems, past medical/surgical history, family history, social history, and examination findings as documented by others and the treating Resident or Fellow Physician.    I examined the patient myself, discussed the findings, reviewed all ancillary testing data and modified these results and reports along with the assessment and plan with the Treating Resident or Fellow Physician. I agree with the note as detailed above.   Noel Navas M.D.  Uveitis and Medical Retina  February 14, 2024        Again, thank you for allowing me to participate in the care of your patient.      Sincerely,    Noel Navas MD  [facility] Dept of Ophthalmology  Uveitis and Medical Retina

## 2024-02-14 NOTE — PATIENT INSTRUCTIONS
For the steroid drop in the left eye, we will slowly taper. Please do 4x/day today. Use 1 drop 3x/day for 3 weeks (until 3/6/23). Then 1 drop 2x/day until next visit.   No drops right eye, no other treatments needed  We placed a consult to Rheumatology today to check for ankylosing spondylitis and Gastroenterology to evaluate for inflammatory or irritable bowel syndrome. They will call you to set up the appointments  Call/message for any issues

## 2024-02-19 NOTE — TELEPHONE ENCOUNTER
REFERRAL INFORMATION:  Referring Provider:  Noel Navas MD   Referring Clinic:  [facility]   Reason for Visit/Diagnosis: HLA B27 (HLA B27 positive)      FUTURE VISIT INFORMATION:  Appointment Date: 3/13/2024  Appointment Time:      NOTES STATUS DETAILS   OFFICE NOTE from Referring Provider Internal 2/14/2024 OV with ROSEMARIE Navas   OFFICE NOTE from Other Specialist N/A    HOSPITAL DISCHARGE SUMMARY/  ED VISITS N/A    OPERATIVE REPORT N/A    MEDICATION LIST Internal         ENDOSCOPY  N/A    COLONOSCOPY N/A    ERCP N/A    EUS N/A    STOOL TESTING N/A    PERTINENT LABS Internal 2/2/2024   PATHOLOGY REPORTS (RELATED) N/A    IMAGING (CT, MRI, EGD, MRCP, Small Bowel Follow Through/SBT, MR/CT Enterography) N/A

## 2024-03-13 ENCOUNTER — PRE VISIT (OUTPATIENT)
Dept: GASTROENTEROLOGY | Facility: CLINIC | Age: 34
End: 2024-03-13

## 2024-03-14 ENCOUNTER — MYC MEDICAL ADVICE (OUTPATIENT)
Dept: OPTOMETRY | Facility: CLINIC | Age: 34
End: 2024-03-14

## 2024-03-15 NOTE — TELEPHONE ENCOUNTER
Addresses in other [employer] message to Dr. Navas-- plan reviewed/updated with Dr. Cavanaugh.    Dennis Henley RN 11:18 AM 03/15/24

## 2024-03-26 ENCOUNTER — OFFICE VISIT (OUTPATIENT)
Dept: OPHTHALMOLOGY | Facility: CLINIC | Age: 34
End: 2024-03-26
Attending: OPHTHALMOLOGY
Payer: COMMERCIAL

## 2024-03-26 DIAGNOSIS — Z15.89 HLA B27 (HLA B27 POSITIVE): ICD-10-CM

## 2024-03-26 DIAGNOSIS — H20.022 RECURRENT IRITIS OF LEFT EYE: Primary | ICD-10-CM

## 2024-03-26 PROCEDURE — 99213 OFFICE O/P EST LOW 20 MIN: CPT | Performed by: OPHTHALMOLOGY

## 2024-03-26 PROCEDURE — G0463 HOSPITAL OUTPT CLINIC VISIT: HCPCS | Performed by: OPHTHALMOLOGY

## 2024-03-26 ASSESSMENT — TONOMETRY
IOP_METHOD: TONOPEN
OD_IOP_MMHG: 13
OS_IOP_MMHG: 17

## 2024-03-26 ASSESSMENT — CUP TO DISC RATIO
OD_RATIO: 0.25
OS_RATIO: 0.25

## 2024-03-26 ASSESSMENT — VISUAL ACUITY
OS_CC: 20/20
CORRECTION_TYPE: CONTACTS
OD_CC: 20/20
METHOD: SNELLEN - LINEAR

## 2024-03-26 ASSESSMENT — EXTERNAL EXAM - RIGHT EYE: OD_EXAM: NORMAL

## 2024-03-26 ASSESSMENT — EXTERNAL EXAM - LEFT EYE: OS_EXAM: NORMAL

## 2024-03-26 NOTE — PATIENT INSTRUCTIONS
Continue the steroid drop (prednisolone) for the left eye: Let's do 2x/day until May 1. Starting May 2, reduce to once daily for 3 weeks (until May 23), then stop. Let me know if there are any issues  Okay to continue wearing contact lenses  and wait five minutes before putting the contact lens and the drop  We discussed trying a omeprazole if Dr. Mane feels this might be helpful for acid reflux symptoms   You can try to add turmeric spices in your cooking, you can try some non-dairy plant milk to see if this helps, cold water showers may also be helpful.

## 2024-03-26 NOTE — PROGRESS NOTES
Chief Complaint/Presenting Concern:  Uveitis follow up    Interval History of Present Ocular Illness:  Miracle Robertson is a 33 year old patient who returns for follow up of her iritis of the left eye. Last visit, we discussed some external referrals and tapering steroid drops.    Miracle was able to taper drop frequency. After going down on the frequency, Miracle noted symptoms increased and we were in touch about increasing the frequency back up and currently on 2-3x/day. Today, Miracle report things are feeling okay.     Interval Updates to Medical/Family/Social History:    Started a new anxiety medication which does not seem to affect fatigue positively nor negatively (still the same).     Relevant Review of Systems Updates:  As above and acid reflux symptoms seem to have been slightly worse recently but overall better with the new anxiety medication     Labs: None since last visit     Current eye related medications: Prednisolone 2-3x/day left eye     Retina/Uveitis Imaging: None today    Assessment:     1. Recurrent iritis of left eye  Still with minimal activity    2. HLA B27 (HLA B27 positive)  As likely cause of inflammation    Plan/Recommendations:    Discussed findings with patient. The left eye has a few inflammation cells today but feels well on 2x/day. We will try to taper again soon but over a more graduated period of time  The right eye remains without inflammation and can be monitored  Eye pressure is normal in each eye and this can be monitored.  Recommend additional testing: None needed at this time.  Continue the steroid drop (prednisolone) for the left eye: Let's do 2x/day until May 1. Starting May 2, reduce to once daily for 3 weeks (until May 23), then stop. Let me know if there are any issues  Okay to continue wearing contact lenses  and wait five minutes before putting the contact lens and the drop  We discussed trying a omeprazole if Dr. Mane feels this might be helpful for acid reflux  symptoms   You can try to add turmeric spices in your cooking, you can try some non-dairy plant milk to see if this helps, cold water showers may also be helpful.     RTC     Dr. Mane next week  Dr. Downey in Rheumatology late May  Eloise TBD/PRN    Physician Attestation     Attending Physician Attestation:  Complete documentation of historical and exam elements from today's encounter can be found in the full encounter summary report (not reduplicated in this progress note). I personally obtained the chief complaint(s) and history of present illness. I confirmed and edited as necessary the review of systems, past medical/surgical history, family history, social history, and examination findings as documented by others; and I examined the patient myself. I personally reviewed the relevant tests, images, and reports as documented above. I formulated and edited as necessary the assessment and plan and discussed the findings and management plan with the patient and family members present at the time of this visit.  Noel Navas M.D., Uveitis and Medical Retina, March 26, 2024

## 2024-03-26 NOTE — LETTER
2024      To Whom It May Concern:      This is a letter regarding Miracle Robertson (: 1990). She has a condition called uveitis which started at the end of  and caused issues towards the end of . While things are improving, this flare made it difficult for Miracle to complete her certification process on the scheduled timeline. Given she is now doing better and was able to get her requirements completed, we would kindly ask that she be allowed to recertify as she has done every year for the last decade.       Thank you very much for your consideration and please contact us for any questions.      Noel Navas M.D.  Uveitis and Medical Retina  [facility] Department of Ophthalmology and Visual Neurosciences

## 2024-03-26 NOTE — NURSING NOTE
Chief Complaints and History of Present Illnesses   Patient presents with    Iritis Follow Up     6 week follow up Recurrent iritis of left eye     Chief Complaint(s) and History of Present Illness(es)       Iritis Follow Up              Associated symptoms: Negative for eye pain, photophobia, flashes and floaters    Treatments tried: eye drops    Pain scale: 0/10    Comments: 6 week follow up Recurrent iritis of left eye              Comments    No VA changes since last visit.   Left eye flared up 2 weeks ago, with pressure and light sensitivity.   No flashes or floaters.   Feeling unusually fatigued lately and was curious if its related.     Ocular meds:  Prednisolone TID left eye     Keshav Ho 2:52 PM March 26, 2024

## 2024-03-26 NOTE — LETTER
3/26/2024       RE: Miracle Robertson  [address]  [place] [address]     Dear Colleague,    Thank you for referring your patient, Miracle Robertson, to the [facility] [place] at [facility]. Please see a copy of my visit note below.    Chief Complaint/Presenting Concern:  Uveitis follow up.    Interval History of Present Ocular Illness:  Miracle Robertson is a 33 year old patient who returns for follow up of her iritis of the left eye. Last visit, we discussed some external referrals and tapering steroid drops.    Miracle was able to taper drop frequency. After going down on the frequency, Miracle noted symptoms increased and we were in touch about increasing the frequency back up and currently on 2-3x/day. Today, Miracle report things are feeling okay.     Interval Updates to Medical/Family/Social History:    Started a new anxiety medication which does not seem to affect fatigue positively nor negatively (still the same).     Relevant Review of Systems Updates:  As above and acid reflux symptoms seem to have been slightly worse recently but overall better with the new anxiety medication.    Labs: None since last visit     Current eye related medications: Prednisolone 2-3x/day left eye     Retina/Uveitis Imaging: None today    Assessment:     1. Recurrent iritis of left eye  Still with minimal activity    2. HLA B27 (HLA B27 positive)  As likely cause of inflammation    Plan/Recommendations:    Discussed findings with patient. The left eye has a few inflammation cells today but feels well on 2x/day. We will try to taper again soon but over a more graduated period of time.  The right eye remains without inflammation and can be monitored.  Eye pressure is normal in each eye and this can be monitored.  Recommend additional testing: None needed at this time.  Continue the steroid drop (prednisolone) for the left eye: Let's do 2x/day until May 1.  Starting May 2, reduce to once daily for 3 weeks (until May 23), then stop. Let me know if there are any issues.  Okay to continue wearing contact lenses  and wait five minutes before putting the contact lens and the drop.  We discussed trying a omeprazole if Dr. Mane feels this might be helpful for acid reflux symptoms.  You can try to add turmeric spices in your cooking, you can try some non-dairy plant milk to see if this helps, cold water showers may also be helpful.     RTC     Dr. Mane next week  Dr. Downey in Rheumatology late May  Eloise TBD/PRN    Physician Attestation    Attending Physician Attestation:  Complete documentation of historical and exam elements from today's encounter can be found in the full encounter summary report (not reduplicated in this progress note). I personally obtained the chief complaint(s) and history of present illness. I confirmed and edited as necessary the review of systems, past medical/surgical history, family history, social history, and examination findings as documented by others; and I examined the patient myself. I personally reviewed the relevant tests, images, and reports as documented above. I formulated and edited as necessary the assessment and plan and discussed the findings and management plan with the patient and family members present at the time of this visit.  Noel Navas M.D., Uveitis and Medical Retina, March 26, 2024       Again, thank you for allowing me to participate in the care of your patient.      Sincerely,    Noel Navas MD  [facility] Dept of Ophthalmology  Uveitis and Medical Retina

## 2024-04-02 PROBLEM — H20.022: Status: ACTIVE | Noted: 2024-04-02

## 2024-04-02 PROBLEM — F33.1 MODERATE EPISODE OF RECURRENT MAJOR DEPRESSIVE DISORDER (H): Status: RESOLVED | Noted: 2024-04-02 | Resolved: 2024-04-02

## 2024-04-02 PROBLEM — F33.42 RECURRENT MAJOR DEPRESSIVE DISORDER, IN FULL REMISSION (H): Status: ACTIVE | Noted: 2024-04-02

## 2024-04-02 PROBLEM — F33.1 MODERATE EPISODE OF RECURRENT MAJOR DEPRESSIVE DISORDER (H): Status: ACTIVE | Noted: 2024-04-02

## 2024-04-02 PROBLEM — Z15.89 HLA B27 (HLA B27 POSITIVE): Status: ACTIVE | Noted: 2024-04-02

## 2024-04-03 ENCOUNTER — OFFICE VISIT (OUTPATIENT)
Dept: INTERNAL MEDICINE | Facility: CLINIC | Age: 34
End: 2024-04-03
Payer: COMMERCIAL

## 2024-04-03 VITALS
TEMPERATURE: 98.9 F | SYSTOLIC BLOOD PRESSURE: 113 MMHG | HEART RATE: 69 BPM | OXYGEN SATURATION: 97 % | DIASTOLIC BLOOD PRESSURE: 74 MMHG

## 2024-04-03 DIAGNOSIS — F33.42 RECURRENT MAJOR DEPRESSIVE DISORDER, IN FULL REMISSION (H): ICD-10-CM

## 2024-04-03 DIAGNOSIS — F41.1 GAD (GENERALIZED ANXIETY DISORDER): ICD-10-CM

## 2024-04-03 DIAGNOSIS — Z12.4 SCREENING FOR CERVICAL CANCER: ICD-10-CM

## 2024-04-03 DIAGNOSIS — Z00.00 ROUTINE HISTORY AND PHYSICAL EXAMINATION OF ADULT: Primary | ICD-10-CM

## 2024-04-03 PROCEDURE — G0145 SCR C/V CYTO,THINLAYER,RESCR: HCPCS | Performed by: INTERNAL MEDICINE

## 2024-04-03 PROCEDURE — 99395 PREV VISIT EST AGE 18-39: CPT | Performed by: INTERNAL MEDICINE

## 2024-04-03 PROCEDURE — 87624 HPV HI-RISK TYP POOLED RSLT: CPT | Performed by: INTERNAL MEDICINE

## 2024-04-03 RX ORDER — VENLAFAXINE HYDROCHLORIDE 75 MG/1
75 CAPSULE, EXTENDED RELEASE ORAL EVERY MORNING
Qty: 90 CAPSULE | Refills: 3 | Status: SHIPPED | OUTPATIENT
Start: 2024-04-03 | End: 2024-05-20

## 2024-04-03 SDOH — HEALTH STABILITY: PHYSICAL HEALTH: ON AVERAGE, HOW MANY DAYS PER WEEK DO YOU ENGAGE IN MODERATE TO STRENUOUS EXERCISE (LIKE A BRISK WALK)?: 4 DAYS

## 2024-04-03 ASSESSMENT — PATIENT HEALTH QUESTIONNAIRE - PHQ9
SUM OF ALL RESPONSES TO PHQ QUESTIONS 1-9: 4
10. IF YOU CHECKED OFF ANY PROBLEMS, HOW DIFFICULT HAVE THESE PROBLEMS MADE IT FOR YOU TO DO YOUR WORK, TAKE CARE OF THINGS AT HOME, OR GET ALONG WITH OTHER PEOPLE: NOT DIFFICULT AT ALL
SUM OF ALL RESPONSES TO PHQ QUESTIONS 1-9: 4

## 2024-04-03 ASSESSMENT — SOCIAL DETERMINANTS OF HEALTH (SDOH): HOW OFTEN DO YOU GET TOGETHER WITH FRIENDS OR RELATIVES?: TWICE A WEEK

## 2024-04-03 NOTE — COMMUNITY RESOURCES LIST (ENGLISH)
April 3, 2024           YOUR PERSONALIZED LIST OF SERVICES & PROGRAMS           & SHELTER    Housing      Free - Client Services  [address] [place] [address] (Distance: 11.4 miles)  Phone: [phone]) [phone]  Website: https://Kontagent/  Language: English  Fee: Free  Transportation Options: Free transportation      [facility] - Housing Stabilization Services  Phone: (536) 273-7940  Website: https://Grokr/Housing-Stabilization.html  Language: English  Hours: Mon 9:00 AM - 5:00 PM Tue 9:00 AM - 5:00 PM Wed 9:00 AM - 5:00 PM Thu 9:00 AM - 5:00 PM Fri 9:00 AM - 5:00 PM  Fee: Insurance  Accessibility: Deaf or hard of hearing, Translation services      [facility] [place] half-way  Phone: (132) 347-8622  Website: https://www.Falcon Social.org/  Language: English    Case Management      Living - Housing Stabilization Services  [address] [place] [address] (Distance: 8.6 miles)  Phone: ([phone]) 809-9532  Website: https://NexSteppe  Language: Occitan, English, Canadian  Fee: Insurance, Self pay      [facility] [place] - Housing Stabilization Services (HSS)  Phone: (552) 887-2365  Website: https://www.FitBionic.GroupGifting.com DBA eGifter/resources  Language: English, Welsh  Hours: Mon 8:00 AM - 4:00 PM Tue 8:00 AM - 4:00 PM Wed 8:00 AM - 4:00 PM Thu 8:00 AM - 4:00 PM Fri 8:00 AM - 4:00 PM  Fee: Free, Insurance  Accessibility: [place] accessible, Blind accommodation, Deaf or hard of hearing, Translation services      [employer] - Housing Stabilization Services  Phone: (347) 758-1817  Website: https://homebasemn.com/  Language: English  Hours: Mon 8:00 AM - 4:00 PM Tue 8:00 AM - 4:00 PM Wed 8:00 AM - 4:00 PM Thu 8:00 AM - 4:00 PM Fri 8:00 AM - 4:00 PM  Fee: Free  Accessibility: Blind accommodation, Deaf or hard of hearing  Transportation Options: Free transportation    Drop-In Services      [facility] - Warming or cooling center - [place]  [facility] [place] [facility]  [address] [place] [address] (Distance: 0.8 miles)  Language: English  Fee: [facility] - Warming or cooling center - [facility] - [facility]  [address] [address] (Distance: 2.6 miles)  Phone: (227) 719-4592  Language: English, Libyan, Malay  Fee: Free      LOVE - LAUNDRY LOVE  Website: http://www.laundrylove.org               IMPORTANT NUMBERS & WEBSITES        Emergency Services  911  .   United Way  211 http://211unitedway.org  .   Poison Control  (110) 942-4139 http://[place]poison.org http://wis[place]poison.org  .     Suicide and Crisis Lifeline  988 http://988lifeline.org  .   Childhelp [place] Child Abuse Hotline  418.390.6464 http://Childhelphotline.org   .   [place] Sexual Assault Hotline  (857) 902-4111 (HOPE) http://HuoBi.org   .     [place] Runaway Safeline  (666) 764-7904 (RUNAWAY) http://PhotoThera.Row44  .   Pregnancy & Postpartum Support  Call/text 948-900-9482  [place]: http://ppsupportmn.org  [place]: http://Tidy Books.com/wi  .   Substance Abuse National Helpline ([facility])  243-891-HELP (9455) http://Findtreatment.gov   .                DISCLAIMER: These resources have been generated via the Handseeing Information Platform. TouchFrame  does not endorse any service providers mentioned in this resource list. Handseeing Information does not guarantee that the services mentioned in this resource list will be available to you or will improve your health or wellness.    [facility]

## 2024-04-03 NOTE — PROGRESS NOTES
ASSESSMENT/PLAN                                                       (Z00.00) Routine history and physical examination of adult  (primary encounter diagnosis)  Comment: PMH, PSH, FH, SH, medications, allergies, immunizations, and preventative health measures reviewed and updated as appropriate.  Plan: see below for plans.      (F33.42) Recurrent major depressive disorder, in full remission (H24)  (F41.1) ODETTE (generalized anxiety disorder)  Comment: well-controlled on current regimen.    Plan: continue present management; refills provided.     (Z12.4) Screening for cervical cancer  Comment: pap obtained today.     Margo Mane MD   [facility]  [address] [address]  T: [phone], F: [phone]    SUBJECTIVE                                                      Miracle Robertson is a very pleasant 33 year old female who presents for a physical.    ROS:  Constitutional: no unintentional weight loss or gain reported; no fevers, chills, or sweats reported  Cardiovascular: no chest pain, palpitations, or edema reported  Respiratory: no cough, wheezing, shortness of breath, or dyspnea on exertion reported  Gastrointestinal: no nausea, vomiting, constipation, diarrhea, or abdominal pain reported  Genitourinary: no urinary frequency, urgency, dysuria, or hematuria reported  Integumentary: no rash or pruritus reported  Musculoskeletal: no back pain, muscle pain, joint pain, or joint swelling reported  Neurologic: no focal weakness, numbness, or tingling reported  Hematologic: no easy bruising or bleeding reported  Endocrine: no heat or cold intolerance reported; no polyuria or polydipsia reported  Psychiatric: see PMH below    Past Medical History:   Diagnosis Date    ODETTE (generalized anxiety disorder)     HLA B27 (HLA B27 positive)     MDD (major depressive disorder)     Recurrent iritis, left      Past Surgical History:   Procedure Laterality Date    NO HISTORY OF SURGERY       Family  History   Problem Relation Age of Onset    Deep Vein Thrombosis (DVT) Mother     Deep Vein Thrombosis (DVT) Brother     Breast Cancer Maternal Grandmother     Diabetes Type 2  Paternal Grandfather     Prostate Cancer Paternal Grandfather     Cerebrovascular Disease No family hx of     Myocardial Infarction No family hx of     Coronary Artery Disease Early Onset No family hx of     Colon Cancer No family hx of     Ovarian Cancer No family hx of      Social History     Occupational History    Occupation: [employer]   Tobacco Use    Smoking status: Never    Smokeless tobacco: Never   Vaping Use    Vaping Use: Never used   Substance and Sexual Activity    Alcohol use: Yes     Comment: 1 drink/week    Drug use: No    Sexual activity: Yes     Partners: Male     Birth control/protection: Pill   Social History Narrative    In a relationship.    No kids.    Regular exercise through job.      No Known Allergies    Current Outpatient Medications   Medication Sig    ALPRAZolam (XANAX) 0.5 MG tablet Take 1 tablet (0.5 mg) by mouth 3 times daily as needed for anxiety    desogestrel-ethinyl estradiol (ISIBLOOM) 0.15-30 MG-MCG tablet Take 1 tablet by mouth daily    fluticasone (FLONASE) 50 MCG/ACT nasal spray Spray 2 sprays into both nostrils daily SHAKE LIQUID AND USE    levocetirizine (XYZAL) 5 MG tablet Take 1 tablet (5 mg) by mouth every evening    prednisoLONE acetate (PRED FORTE) 1 % ophthalmic suspension One drop in Left eye 4/day for one week, then 3/day til next visit    venlafaxine (EFFEXOR XR) 75 MG 24 hr capsule Take 75 mg by mouth every morning     Immunization History   Administered Date(s) Administered    COVID-19 Bivalent 12+ (Pfizer) 11/14/2022    COVID-19 MONOVALENT 12+ (Pfizer) 11/03/2021    COVID-19 Vaccine (Norma) 04/07/2021    DTAP (<7y) 11/19/1993, 04/15/1997    Flu, Unspecified 01/30/2017    HPV Quadrivalent 09/07/2007, 11/08/2007, 03/17/2008    Hepatitis B, Peds 07/17/1997, 08/20/1997,  03/30/1998    Historic Hib Hib-titer 1992, 1992, 12/17/1993    Historical DTP/aP 1992, 1992, 1992    Hpv, Unspecified  09/07/2007, 11/08/2007, 03/17/2008    Influenza (H1N1) 01/19/2010    Influenza (IIV3) PF 10/23/2015    Influenza Vaccine >6 months,quad, PF 02/20/2018, 01/20/2020, 11/18/2020, 11/03/2021, 11/14/2022    Influenza, seasonal, injectable, PF 01/30/2017    MMR 08/06/1993, 04/15/1997    Meningococcal ACWY (Menactra ) 01/19/2010    Meningococcal ACWY (Menveo ) 01/19/2010    Meningococcal,unspecified 01/19/2010    OPV, trivalent, live 04/15/1997    OPV, unspecified 1992, 1992, 11/19/1993    Poliovirus, inactivated (IPV) 04/15/1997    TDAP Vaccine (Adacel) 06/15/2007, 03/21/2019    Tdap (Adult) Unspecified Formulation 02/20/2018    Varicella 06/16/1994     PREVENTATIVE HEALTH                                                      BMI: unable to calculate  Blood pressure: within normal limits   Breast CA screening: not medically indicated at this time   Cervical CA screening: DUE  Colon CA screening: not medically indicated at this time   Lung CA screening: n/a   Dexa: not medically indicated at this time   Screening cholesterol: not medically indicated at this time   Screening diabetes: not medically indicated at this time   STD testing: no risk factors present  Alcohol misuse screening: alcohol use reviewed - no intervention indicated at this time  Immunizations: reviewed;  COVID-19 booster DUE - patient declines    OBJECTIVE                                                      /74   Pulse 69   Temp 98.9  F (37.2  C) (Temporal)   SpO2 97%   Constitutional: well-appearing  Head, Ears, and Eyes: normocephalic; normal external auditory canal and pinna; tympanic membranes visualized and normal; normal lids and conjunctivae  Neck: supple, symmetric, no thyromegaly or lymphadenopathy  Respiratory: normal respiratory effort; clear to auscultation  bilaterally  Cardiovascular: regular rate and rhythm; no edema  Gastrointestinal: soft, non-tender, and non-distended; no organomegaly or masses  Genitourinary: external genitalia, urethral meatus, and vagina normal; cervix visualized and normal in appearance  Musculoskeletal: normal gait and station  Psych: normal judgment and insight; normal mood and affect; recent and remote memory intact    ---  (Note was completed, in part, with [employer] voice-recognition software. Documentation was reviewed, but some grammatical, spelling, and word errors may remain.)

## 2024-04-05 LAB
BKR LAB AP GYN ADEQUACY: NORMAL
BKR LAB AP GYN INTERPRETATION: NORMAL
BKR LAB AP HPV REFLEX: NORMAL
BKR LAB AP PREVIOUS ABNORMAL: NORMAL
PATH REPORT.COMMENTS IMP SPEC: NORMAL
PATH REPORT.COMMENTS IMP SPEC: NORMAL
PATH REPORT.RELEVANT HX SPEC: NORMAL

## 2024-05-07 ENCOUNTER — LAB (OUTPATIENT)
Dept: LAB | Facility: CLINIC | Age: 34
End: 2024-05-07
Payer: COMMERCIAL

## 2024-05-07 ENCOUNTER — TELEPHONE (OUTPATIENT)
Dept: RHEUMATOLOGY | Facility: CLINIC | Age: 34
End: 2024-05-07
Payer: COMMERCIAL

## 2024-05-07 ENCOUNTER — OFFICE VISIT (OUTPATIENT)
Dept: RHEUMATOLOGY | Facility: CLINIC | Age: 34
End: 2024-05-07
Attending: OPHTHALMOLOGY
Payer: COMMERCIAL

## 2024-05-07 VITALS
DIASTOLIC BLOOD PRESSURE: 77 MMHG | HEART RATE: 74 BPM | WEIGHT: 157 LBS | BODY MASS INDEX: 26.13 KG/M2 | SYSTOLIC BLOOD PRESSURE: 116 MMHG | OXYGEN SATURATION: 97 %

## 2024-05-07 DIAGNOSIS — Z15.89 HLA B27 (HLA B27 POSITIVE): ICD-10-CM

## 2024-05-07 DIAGNOSIS — G89.29 CHRONIC LEFT-SIDED LOW BACK PAIN WITHOUT SCIATICA: ICD-10-CM

## 2024-05-07 DIAGNOSIS — M54.50 CHRONIC LEFT-SIDED LOW BACK PAIN WITHOUT SCIATICA: ICD-10-CM

## 2024-05-07 DIAGNOSIS — H20.022 RECURRENT IRITIS, LEFT: Primary | ICD-10-CM

## 2024-05-07 DIAGNOSIS — H20.022 RECURRENT IRITIS, LEFT: ICD-10-CM

## 2024-05-07 PROCEDURE — 36415 COLL VENOUS BLD VENIPUNCTURE: CPT

## 2024-05-07 PROCEDURE — 86704 HEP B CORE ANTIBODY TOTAL: CPT

## 2024-05-07 PROCEDURE — 86140 C-REACTIVE PROTEIN: CPT

## 2024-05-07 PROCEDURE — 86235 NUCLEAR ANTIGEN ANTIBODY: CPT

## 2024-05-07 PROCEDURE — G0463 HOSPITAL OUTPT CLINIC VISIT: HCPCS | Performed by: INTERNAL MEDICINE

## 2024-05-07 PROCEDURE — 86225 DNA ANTIBODY NATIVE: CPT

## 2024-05-07 PROCEDURE — 99204 OFFICE O/P NEW MOD 45 MIN: CPT | Performed by: INTERNAL MEDICINE

## 2024-05-07 PROCEDURE — 86160 COMPLEMENT ANTIGEN: CPT

## 2024-05-07 PROCEDURE — 86038 ANTINUCLEAR ANTIBODIES: CPT

## 2024-05-07 PROCEDURE — 86706 HEP B SURFACE ANTIBODY: CPT

## 2024-05-07 PROCEDURE — 86039 ANTINUCLEAR ANTIBODIES (ANA): CPT

## 2024-05-07 ASSESSMENT — PAIN SCALES - GENERAL: PAINLEVEL: NO PAIN (0)

## 2024-05-07 NOTE — TELEPHONE ENCOUNTER
Dear :     I am seeing our mutual patient in clinic today. Would you recommend adding an immunosuppression for  such as Humira  for better control of her iritis? Thank you.

## 2024-05-07 NOTE — LETTER
5/7/2024       RE: Miracle Robertson  [address]  [facility] [address]     Dear Colleague,    Thank you for referring your patient, Miracle Robertson, to the [facility] RHEUMATOLOGY at [facility]. Please see a copy of my visit note below.                       5/7/2024    Chief Complaint/Reason for Visit: New visit for evaluation of recurrent left iritis    HPI:    Miracle Robertson is a 33 year old White female with past medical history listed below.  The patient follows up with Dr. Navas for management of recurrent left iritis.  She is currently on steroid drops on a tapering schedule to be completed on May 23.  Her symptoms started in 2022.  She was initially treated with Maxitrol drops for allergic conjunctivitis without improvement.  Later she saw optometrist  in May 2023 and was diagnosed with anterior uveitis of the left eye.  She saw Dr. Griffith again in feb 2024 and was told to restart prednisolone and follow-up with uveitis clinic. She has low back pain lateralized to the left with radiation to her groin that started in March 2023. She was not able to sleep at that time due to the pain. She has been going to chiropractor - massage has helped. She backed off on heavy lifting and running that helped as well. She has been mindful of what kinds of exercises she does. Denied having pain of hands, feet, no history of dactylitis. Nsaids has not helped with her hip pain. She did not respond to prednisone. PT did not work either.     REVIEW OF SYSTEMS    General - pos for fatigue   HEENT - pos for dry eyes and dry mouth   Cardiovascular - neg for chest pain or palpitations   Respiratory - neg for sob or cough   Gastrointestinal - neg for bloody diarrhea, abdominal pain  Genitourinary - neg for blood in urine   Skin - neg for malar rash  Neuro - occasional tingling in left groin area   Hematologic - neg for blood clots,   Psych - pos for generalized  anxiety and episodes of depression, on venlafaxine  OBS/GYN- never been pregnant  Pregnant? - she is on OCP  FH - no known history of arthritis  Paternal GF - type 2 DM   Brother and mom - history of blood clots     Smoking - never  Alcohol - social drinking   No use of recreational drugs     Past Medical History:   Diagnosis Date    OEDTTE (generalized anxiety disorder)     HLA B27 (HLA B27 positive)     MDD (major depressive disorder)     Recurrent iritis, left      Past Surgical History:   Procedure Laterality Date    NO HISTORY OF SURGERY       Family History   Problem Relation Age of Onset    Deep Vein Thrombosis (DVT) Mother     Deep Vein Thrombosis (DVT) Brother     Breast Cancer Maternal Grandmother     Diabetes Type 2  Paternal Grandfather     Prostate Cancer Paternal Grandfather     Cerebrovascular Disease No family hx of     Myocardial Infarction No family hx of     Coronary Artery Disease Early Onset No family hx of     Colon Cancer No family hx of     Ovarian Cancer No family hx of      Social History     Socioeconomic History    Marital status: Single   Occupational History    Occupation: [employer]   Tobacco Use    Smoking status: Never    Smokeless tobacco: Never   Vaping Use    Vaping status: Never Used   Substance and Sexual Activity    Alcohol use: Yes     Comment: 1 drink/week    Drug use: No    Sexual activity: Yes     Partners: Male     Birth control/protection: Pill   Social History Narrative    In a relationship.    No kids.    Regular exercise through job.      Social Determinants of Health     Financial Resource Strain: Low Risk  (4/3/2024)    Financial Resource Strain     Within the past 12 months, have you or your family members you live with been unable to get utilities (heat, electricity) when it was really needed?: No   Food Insecurity: Low Risk  (4/3/2024)    Food Insecurity     Within the past 12 months, did you worry that your food would run out before you got money to  buy more?: No     Within the past 12 months, did the food you bought just not last and you didn t have money to get more?: No   Transportation Needs: Low Risk  (4/3/2024)    Transportation Needs     Within the past 12 months, has lack of transportation kept you from medical appointments, getting your medicines, non-medical meetings or appointments, work, or from getting things that you need?: No   Physical Activity: Unknown (4/3/2024)    Exercise Vital Sign     Days of Exercise per Week: 4 days   Stress: No Stress Concern Present (4/3/2024)    [place] [employer] - Occupational Stress Questionnaire     Feeling of Stress : Only a little   Social Connections: Unknown (4/3/2024)    Social Connection and Isolation Panel [NHANES]     Frequency of Social Gatherings with Friends and Family: Twice a week   Interpersonal Safety: Low Risk  (4/3/2024)    Interpersonal Safety     Do you feel physically and emotionally safe where you currently live?: Yes     Within the past 12 months, have you been hit, slapped, kicked or otherwise physically hurt by someone?: No     Within the past 12 months, have you been humiliated or emotionally abused in other ways by your partner or ex-partner?: No   Housing Stability: High Risk (4/3/2024)    Housing Stability     Do you have housing? : No     Are you worried about losing your housing?: No       No Known Allergies    Current Outpatient Medications   Medication Sig Dispense Refill    ALPRAZolam (XANAX) 0.5 MG tablet Take 1 tablet (0.5 mg) by mouth 3 times daily as needed for anxiety 20 tablet 0    desogestrel-ethinyl estradiol (ISIBLOOM) 0.15-30 MG-MCG tablet Take 1 tablet by mouth daily 84 tablet 3    fluticasone (FLONASE) 50 MCG/ACT nasal spray Spray 2 sprays into both nostrils daily SHAKE LIQUID AND USE      prednisoLONE acetate (PRED FORTE) 1 % ophthalmic suspension One drop in Left eye 4/day for one week, then 3/day til next visit 10 mL 4    venlafaxine (EFFEXOR  XR) 75 MG 24 hr capsule Take 1 capsule (75 mg) by mouth every morning 90 capsule 3    levocetirizine (XYZAL) 5 MG tablet Take 1 tablet (5 mg) by mouth every evening (Patient not taking: Reported on 5/7/2024) 30 tablet 1     No current facility-administered medications for this visit.       PHYSICAL EXAM    /77 (BP Location: Right arm, Patient Position: Sitting, Cuff Size: Adult Regular)   Pulse 74   Wt 71.2 kg (157 lb)   LMP 05/06/2024 (Exact Date)   SpO2 97%   BMI 26.13 kg/m        General: Alert, No apparent distress  Psych: Affect euthymic  Eyes: Sclera noninjected  Ears, Nose, Throat, Mouth: Oral mucosa moist with normal salivary pool  Skin: No rashes noted  Cardiovascular: Regular rate and rhythm, Normal S1 and S2 and No murmurs, rubs or gallops  Respiratory: Clear to auscultation with no wheezing or crackles  Neuro: Normal gait and Able to arise from seated position unassisted  Musculoskeletal: Hands:  Normal. No synovitis   Wrists:  Normal.  Elbows:  Normal.  Shoulders:  Normal.  Feet:  Normal.  Ankles:  Normal.  Knees:  Normal.  Hips:  Normal. Pain reproduced with external rotation     LABS   Reviewed as below.     Feb 2024  CBC normal  BMP-creatinine elevated at 1.02  QuantiFERON-TB negative  Treponema nonreactive  HLA-B27 positive    2021  Vitamin D normal  TSH normal       Latest Reference Range & Units 07/28/21 14:40   Hep B Surface Agn Nonreactive  Nonreactive   Hepatitis C Antibody Nonreactive  Nonreactive   HIV Antigen Antibody Combo Nonreactive  Nonreactive     X-ray pelvis and hip April 2023 did not show substantial degenerative changes  X-ray L-spine-no significant degenerative changes    ASSESSMENT      1. Recurrent iritis, left    2. HLA B27 (HLA B27 positive)    3. Chronic left-sided low back pain without sciatica        (H20.022) Recurrent iritis, left  (primary encounter diagnosis)  Comment: Her symptoms started in 2022.  She was initially treated with Maxitrol drops for allergic  conjunctivitis without improvement.  Later she saw optometrist  in May 2023 and was diagnosed with anterior uveitis of the left eye.  She saw Dr. Griffith again in feb 2024 and was told to restart prednisolone and follow-up with uveitis clinic.   Plan:   Sent epic tele message to  to see if the patient needs to be on an immunosuppressant for control of her iritis. If so, will consider Humira.   Continue to follow up with Ophthalmology.     (Z15.89) HLA B27 (HLA B27 positive)  Chronic left sided low back pain without sciatica  Comment: noted. Has left sided low back and hip pain. No peripheral joint symptoms, dactylitis or FH of spondyloarthritis.  Plan: check additional labs as below.   Will get MRI SI joints to look for sacroilitis.   Orders Placed This Encounter   Procedures    MR Sacroilliac Joints wo Contrast    Hepatitis B Surface Antibody    Hepatitis B core antibody    CRP inflammation    Anti Nuclear Toshia IgG by IFA with Reflex    SSA Ro CANDELARIA Antibody IgG    SSB La CANDELARIA Antibody IgG     RTC - 6 months.     RENETTA KOVACS MD    Division of Rheumatic & Autoimmune Diseases  [facility]

## 2024-05-07 NOTE — PROGRESS NOTES
5/7/2024    Chief Complaint/Reason for Visit: New visit for evaluation of recurrent left iritis    HPI:    Miracle Robertson is a 33 year old White female with past medical history listed below.  The patient follows up with Dr. Navas for management of recurrent left iritis.  She is currently on steroid drops on a tapering schedule to be completed on May 23.  Her symptoms started in 2022.  She was initially treated with Maxitrol drops for allergic conjunctivitis without improvement.  Later she saw optometrist  in May 2023 and was diagnosed with anterior uveitis of the left eye.  She saw Dr. Griffith again in feb 2024 and was told to restart prednisolone and follow-up with uveitis clinic. She has low back pain lateralized to the left with radiation to her groin that started in March 2023. She was not able to sleep at that time due to the pain. She has been going to chiropractor - massage has helped. She backed off on heavy lifting and running that helped as well. She has been mindful of what kinds of exercises she does. Denied having pain of hands, feet, no history of dactylitis. Nsaids has not helped with her hip pain. She did not respond to prednisone. PT did not work either.     REVIEW OF SYSTEMS    General - pos for fatigue   HEENT - pos for dry eyes and dry mouth   Cardiovascular - neg for chest pain or palpitations   Respiratory - neg for sob or cough   Gastrointestinal - neg for bloody diarrhea, abdominal pain  Genitourinary - neg for blood in urine   Skin - neg for malar rash  Neuro - occasional tingling in left groin area   Hematologic - neg for blood clots,   Psych - pos for generalized anxiety and episodes of depression, on venlafaxine  OBS/GYN- never been pregnant  Pregnant? - she is on OCP  FH - no known history of arthritis  Paternal GF - type 2 DM   Brother and mom - history of blood clots     Smoking - never  Alcohol - social drinking   No use of recreational drugs     Past Medical  History:   Diagnosis Date    ODETTE (generalized anxiety disorder)     HLA B27 (HLA B27 positive)     MDD (major depressive disorder)     Recurrent iritis, left      Past Surgical History:   Procedure Laterality Date    NO HISTORY OF SURGERY       Family History   Problem Relation Age of Onset    Deep Vein Thrombosis (DVT) Mother     Deep Vein Thrombosis (DVT) Brother     Breast Cancer Maternal Grandmother     Diabetes Type 2  Paternal Grandfather     Prostate Cancer Paternal Grandfather     Cerebrovascular Disease No family hx of     Myocardial Infarction No family hx of     Coronary Artery Disease Early Onset No family hx of     Colon Cancer No family hx of     Ovarian Cancer No family hx of      Social History     Socioeconomic History    Marital status: Single   Occupational History    Occupation: [employer]   Tobacco Use    Smoking status: Never    Smokeless tobacco: Never   Vaping Use    Vaping status: Never Used   Substance and Sexual Activity    Alcohol use: Yes     Comment: 1 drink/week    Drug use: No    Sexual activity: Yes     Partners: Male     Birth control/protection: Pill   Social History Narrative    In a relationship.    No kids.    Regular exercise through job.      Social Determinants of Health     Financial Resource Strain: Low Risk  (4/3/2024)    Financial Resource Strain     Within the past 12 months, have you or your family members you live with been unable to get utilities (heat, electricity) when it was really needed?: No   Food Insecurity: Low Risk  (4/3/2024)    Food Insecurity     Within the past 12 months, did you worry that your food would run out before you got money to buy more?: No     Within the past 12 months, did the food you bought just not last and you didn t have money to get more?: No   Transportation Needs: Low Risk  (4/3/2024)    Transportation Needs     Within the past 12 months, has lack of transportation kept you from medical appointments, getting your  medicines, non-medical meetings or appointments, work, or from getting things that you need?: No   Physical Activity: Unknown (4/3/2024)    Exercise Vital Sign     Days of Exercise per Week: 4 days   Stress: No Stress Concern Present (4/3/2024)    [place] of [employer]ational [employer] - Occupational Stress Questionnaire     Feeling of Stress : Only a little   Social Connections: Unknown (4/3/2024)    Social Connection and Isolation Panel [NHANES]     Frequency of Social Gatherings with Friends and Family: Twice a week   Interpersonal Safety: Low Risk  (4/3/2024)    Interpersonal Safety     Do you feel physically and emotionally safe where you currently live?: Yes     Within the past 12 months, have you been hit, slapped, kicked or otherwise physically hurt by someone?: No     Within the past 12 months, have you been humiliated or emotionally abused in other ways by your partner or ex-partner?: No   Housing Stability: High Risk (4/3/2024)    Housing Stability     Do you have housing? : No     Are you worried about losing your housing?: No       No Known Allergies    Current Outpatient Medications   Medication Sig Dispense Refill    ALPRAZolam (XANAX) 0.5 MG tablet Take 1 tablet (0.5 mg) by mouth 3 times daily as needed for anxiety 20 tablet 0    desogestrel-ethinyl estradiol (ISIBLOOM) 0.15-30 MG-MCG tablet Take 1 tablet by mouth daily 84 tablet 3    fluticasone (FLONASE) 50 MCG/ACT nasal spray Spray 2 sprays into both nostrils daily SHAKE LIQUID AND USE      prednisoLONE acetate (PRED FORTE) 1 % ophthalmic suspension One drop in Left eye 4/day for one week, then 3/day til next visit 10 mL 4    venlafaxine (EFFEXOR XR) 75 MG 24 hr capsule Take 1 capsule (75 mg) by mouth every morning 90 capsule 3    levocetirizine (XYZAL) 5 MG tablet Take 1 tablet (5 mg) by mouth every evening (Patient not taking: Reported on 5/7/2024) 30 tablet 1     No current facility-administered medications for this visit.       PHYSICAL  EXAM    /77 (BP Location: Right arm, Patient Position: Sitting, Cuff Size: Adult Regular)   Pulse 74   Wt 71.2 kg (157 lb)   LMP 05/06/2024 (Exact Date)   SpO2 97%   BMI 26.13 kg/m        General: Alert, No apparent distress  Psych: Affect euthymic  Eyes: Sclera noninjected  Ears, Nose, Throat, Mouth: Oral mucosa moist with normal salivary pool  Skin: No rashes noted  Cardiovascular: Regular rate and rhythm, Normal S1 and S2 and No murmurs, rubs or gallops  Respiratory: Clear to auscultation with no wheezing or crackles  Neuro: Normal gait and Able to arise from seated position unassisted  Musculoskeletal: Hands:  Normal. No synovitis   Wrists:  Normal.  Elbows:  Normal.  Shoulders:  Normal.  Feet:  Normal.  Ankles:  Normal.  Knees:  Normal.  Hips:  Normal. Pain reproduced with external rotation     LABS   Reviewed as below.     Feb 2024  CBC normal  BMP-creatinine elevated at 1.02  QuantiFERON-TB negative  Treponema nonreactive  HLA-B27 positive    2021  Vitamin D normal  TSH normal       Latest Reference Range & Units 07/28/21 14:40   Hep B Surface Agn Nonreactive  Nonreactive   Hepatitis C Antibody Nonreactive  Nonreactive   HIV Antigen Antibody Combo Nonreactive  Nonreactive     X-ray pelvis and hip April 2023 did not show substantial degenerative changes  X-ray L-spine-no significant degenerative changes    ASSESSMENT      1. Recurrent iritis, left    2. HLA B27 (HLA B27 positive)    3. Chronic left-sided low back pain without sciatica        (H20.022) Recurrent iritis, left  (primary encounter diagnosis)  Comment: Her symptoms started in 2022.  She was initially treated with Maxitrol drops for allergic conjunctivitis without improvement.  Later she saw optometrist  in May 2023 and was diagnosed with anterior uveitis of the left eye.  She saw Dr. Griffith again in feb 2024 and was told to restart prednisolone and follow-up with uveitis clinic.   Plan:   Sent epic tele message to  to see if  the patient needs to be on an immunosuppressant for control of her iritis. If so, will consider Humira.   Continue to follow up with Ophthalmology.     (Z15.89) HLA B27 (HLA B27 positive)  Chronic left sided low back pain without sciatica  Comment: noted. Has left sided low back and hip pain. No peripheral joint symptoms, dactylitis or FH of spondyloarthritis.  Plan: check additional labs as below.   Will get MRI SI joints to look for sacroilitis.   Orders Placed This Encounter   Procedures    MR Sacroilliac Joints wo Contrast    Hepatitis B Surface Antibody    Hepatitis B core antibody    CRP inflammation    Anti Nuclear Toshia IgG by IFA with Reflex    SSA Ro CANDELARIA Antibody IgG    SSB La CANDELARIA Antibody IgG     RTC - 6 months.     RENETTA KOVACS MD    Division of Rheumatic & Autoimmune Diseases  [facility]

## 2024-05-07 NOTE — NURSING NOTE
Chief Complaint   Patient presents with    Consult     /77 (BP Location: Right arm, Patient Position: Sitting, Cuff Size: Adult Regular)   Pulse 74   Wt 71.2 kg (157 lb)   LMP 05/06/2024 (Exact Date)   SpO2 97%   BMI 26.13 kg/m

## 2024-05-08 ENCOUNTER — DOCUMENTATION ONLY (OUTPATIENT)
Dept: RHEUMATOLOGY | Facility: CLINIC | Age: 34
End: 2024-05-08
Payer: COMMERCIAL

## 2024-05-08 LAB
CRP SERPL-MCNC: 5.13 MG/L
ENA SS-A AB SER IA-ACNC: <0.5 U/ML
ENA SS-A AB SER IA-ACNC: NEGATIVE
ENA SS-B IGG SER IA-ACNC: <0.6 U/ML
ENA SS-B IGG SER IA-ACNC: NEGATIVE
HBV CORE AB SERPL QL IA: NONREACTIVE
HBV SURFACE AB SERPL IA-ACNC: >1000 M[IU]/ML
HBV SURFACE AB SERPL IA-ACNC: REACTIVE M[IU]/ML

## 2024-05-09 LAB
ANA PAT SER IF-IMP: ABNORMAL
ANA SER QL IF: POSITIVE
ANA TITR SER IF: ABNORMAL {TITER}

## 2024-05-10 DIAGNOSIS — Z15.89 HLA B27 (HLA B27 POSITIVE): Primary | ICD-10-CM

## 2024-05-10 DIAGNOSIS — H20.022 RECURRENT IRITIS, LEFT: Primary | ICD-10-CM

## 2024-05-10 LAB
C3 SERPL-MCNC: 126 MG/DL (ref 81–157)
C4 SERPL-MCNC: 22 MG/DL (ref 13–39)

## 2024-05-13 LAB
C3 SERPL-MCNC: 133 MG/DL (ref 81–157)
C4 SERPL-MCNC: 23 MG/DL (ref 13–39)
DSDNA AB SER-ACNC: 3.6 IU/ML
ENA SM IGG SER IA-ACNC: <0.7 U/ML
ENA SM IGG SER IA-ACNC: <0.7 U/ML
ENA SM IGG SER IA-ACNC: NEGATIVE
ENA SM IGG SER IA-ACNC: NEGATIVE
U1 SNRNP IGG SER IA-ACNC: <1.1 U/ML
U1 SNRNP IGG SER IA-ACNC: NEGATIVE

## 2024-05-14 DIAGNOSIS — J30.2 SEASONAL ALLERGIC RHINITIS, UNSPECIFIED TRIGGER: Primary | ICD-10-CM

## 2024-05-14 RX ORDER — FLUTICASONE PROPIONATE 50 MCG
2 SPRAY, SUSPENSION (ML) NASAL DAILY
Qty: 16 G | Refills: 0 | Status: SHIPPED | OUTPATIENT
Start: 2024-05-14

## 2024-05-14 RX ORDER — FLUTICASONE PROPIONATE 50 MCG
2 SPRAY, SUSPENSION (ML) NASAL DAILY
Qty: 16 G | OUTPATIENT
Start: 2024-05-14

## 2024-05-20 ENCOUNTER — MYC REFILL (OUTPATIENT)
Dept: INTERNAL MEDICINE | Facility: CLINIC | Age: 34
End: 2024-05-20
Payer: COMMERCIAL

## 2024-05-20 DIAGNOSIS — F41.1 GAD (GENERALIZED ANXIETY DISORDER): ICD-10-CM

## 2024-05-20 DIAGNOSIS — F33.42 RECURRENT MAJOR DEPRESSIVE DISORDER, IN FULL REMISSION (H): ICD-10-CM

## 2024-05-20 RX ORDER — VENLAFAXINE HYDROCHLORIDE 75 MG/1
75 CAPSULE, EXTENDED RELEASE ORAL EVERY MORNING
Qty: 90 CAPSULE | Refills: 3 | Status: SHIPPED | OUTPATIENT
Start: 2024-05-20

## 2024-05-23 ENCOUNTER — LAB (OUTPATIENT)
Dept: LAB | Facility: CLINIC | Age: 34
End: 2024-05-23
Payer: COMMERCIAL

## 2024-05-23 ENCOUNTER — E-VISIT (OUTPATIENT)
Dept: INTERNAL MEDICINE | Facility: CLINIC | Age: 34
End: 2024-05-23
Payer: COMMERCIAL

## 2024-05-23 DIAGNOSIS — J06.9 UPPER RESPIRATORY TRACT INFECTION, UNSPECIFIED TYPE: ICD-10-CM

## 2024-05-23 DIAGNOSIS — J02.9 SORE THROAT: ICD-10-CM

## 2024-05-23 DIAGNOSIS — J06.9 UPPER RESPIRATORY TRACT INFECTION, UNSPECIFIED TYPE: Primary | ICD-10-CM

## 2024-05-23 LAB
DEPRECATED S PYO AG THROAT QL EIA: NEGATIVE
GROUP A STREP BY PCR: NOT DETECTED

## 2024-05-23 PROCEDURE — 99421 OL DIG E/M SVC 5-10 MIN: CPT | Performed by: INTERNAL MEDICINE

## 2024-05-23 PROCEDURE — 87651 STREP A DNA AMP PROBE: CPT

## 2024-05-23 PROCEDURE — 87635 SARS-COV-2 COVID-19 AMP PRB: CPT

## 2024-05-24 ENCOUNTER — TELEPHONE (OUTPATIENT)
Dept: NURSING | Facility: CLINIC | Age: 34
End: 2024-05-24
Payer: COMMERCIAL

## 2024-05-24 LAB — SARS-COV-2 RNA RESP QL NAA+PROBE: POSITIVE

## 2024-05-24 NOTE — TELEPHONE ENCOUNTER
Patient classified as COVID treatment eligible by Epic high risk algorithm:  Yes    Coronavirus (COVID-19) Notification    Reason for call  Notify of POSITIVE COVID-19 lab result, assess symptoms,  review [facility] recommendations    Lab Result   Lab test for 2019-nCoV rRt-PCR or SARS-COV-2 PCR  Oropharyngeal AND/OR nasopharyngeal swabs were POSITIVE for 2019-nCoV RNA [OR] SARS-COV-2 RNA (COVID-19) RNA     We have been unable to reach patient by phone at this time to notify of their Positive COVID-19 result.    Left voicemail message requesting a call back to [phone] [facility] for results.        A Positive COVID-19 letter will be sent via [employer] or the mail.    Milagro Chavez

## 2024-06-09 ENCOUNTER — MYC MEDICAL ADVICE (OUTPATIENT)
Dept: INTERNAL MEDICINE | Facility: CLINIC | Age: 34
End: 2024-06-09
Payer: COMMERCIAL

## 2024-06-18 NOTE — PROGRESS NOTES
07/06/23 0500   Appointment Info   Signing clinician's name / credentials Kimberley Buckner  PT [facility]   Total/Authorized Visits E+T 4-6 per PT   Visits Used 4   Medical Diagnosis LBP with radiulopathy,   PT Tx Diagnosis LBP with radiulopathy, L hip pain (impingement?)   Other pertinent information  and self employed. Therapist Problem List:  1. Decr lumbar mobility flex and ext with decr sx after extension ex  2. L hip mm weakness  3. Decr L hip mobility  4. Decr L LE proprioception/SL.  Wants to return to heavy lifting, running but plans walking, bodyweight, walking and swimming as she heals   PT Goal 1   Goal Identifier sitting   Goal Description Hours patient will be able to sit 2+ hours as needed pain 1/10 or less   Rationale   (to allow rest from standing;for job requirements in their work place)   Goal Progress low back  pain with sitting > 30min   Target Date 07/03/23   Subjective Report   Subjective Report low back is feeling alot better.  hip was doing well until slept on blow up mattress with left groin pain.   Objective Measures   Objective Measures Objective Measure 1   Objective Measure 1   Objective Measure + left fadir.  Tender L hip add'rs.  Loss of left HF length with Marcos testing   Treatment Interventions (PT)   Interventions Therapeutic Procedure/Exercise;Manual Therapy   Therapeutic Procedure/Exercise   Therapeutic Procedures: strength, endurance, ROM, flexibility minutes (10550) 23   PTRx Ther Proc 1 Single Leg Bridge   PTRx Ther Proc 1 - Details 5-10x   PTRx Ther Proc 2 Kneeling Hip Flexor Stretch   PTRx Ther Proc 2 - Details 3 reps with 15-20 sec hold   PTRx Ther Proc 3 Bridging #2A Weight Shift   PTRx Ther Proc 3 - Details 10 pair   PTRx Ther Proc 4 Side Stepping With Theraband   PTRx Ther Proc 4 - Details to fatigue   PTRx Ther Proc 5 Bridging #1   PTRx Ther Proc 5 - Details 10x   PTRx Ther Proc 6 Prone Unilateral Lumbar Extension   PTRx Ther Proc 6 - Details 10x 2-4x/day    PTRx Ther Proc 7 Hip Abduction Straight Leg Raise   PTRx Ther Proc 7 - Details 15 working up to 30 on each leg   PTRx Ther Proc 8 Prone Press Ups   PTRx Ther Proc 8 - Details 2 x 10 better after   PTRx Ther Proc 9 Prone Hip Rotation   PTRx Ther Proc 9 - Details 1' bilat in tolerable range   Therapeutic Activity   PTRx Ther Act 1 Side-lying Positional Traction   PTRx Ther Act 1 - Details R SL over a folded pillow with L UE extended in flexion for L side lumbar release 2'   PTRx Ther Act 2 Education Sheet General   PTRx Ther Act 2 - Details ideas for yoga- 1. keep lumbar spine in neutral with any forward folds2. flex knees in downward dog3. sub wide leg frog/adductor stretch for child's pose if you need to4. figure 4 or pigeon as much as you like with support for neutral spineOther- ok for more walking swimming.  When pain decreases you can add bodyweight ex but not loaded yet or running/jumpingYes to resuming core strength with all 4's or planks use modifications like knees   Manual Therapy   Manual Therapy: Mobilization, MFR, MLD, friction massage minutes (21855) 15   Manual Therapy Manual Therapy 3;Manual Therapy 2   Manual Therapy 1 LAD L LE supine   Manual Therapy 2 axial sep in R LS with QL MFR   Manual Therapy 3 MFR L hip add'rs in hook lying, quads supine with leg off side of table   Total Session Time   Timed Code Treatment Minutes 38   Total Treatment Time (sum of timed and untimed services) 38         DISCHARGE  Reason for Discharge: Patient has failed to schedule further appointments.    Equipment Issued: none    Discharge Plan: Patient to continue home program.    Referring Provider:  Brian Ivan

## 2024-07-12 ENCOUNTER — HOSPITAL ENCOUNTER (OUTPATIENT)
Dept: MRI IMAGING | Facility: CLINIC | Age: 34
Discharge: HOME | End: 2024-07-12
Attending: INTERNAL MEDICINE | Admitting: INTERNAL MEDICINE
Payer: COMMERCIAL

## 2024-07-12 DIAGNOSIS — Z15.89 HLA B27 (HLA B27 POSITIVE): ICD-10-CM

## 2024-07-12 PROCEDURE — 72195 MRI PELVIS W/O DYE: CPT

## 2024-07-12 PROCEDURE — 72195 MRI PELVIS W/O DYE: CPT | Mod: 26 | Performed by: RADIOLOGY

## 2024-07-13 ENCOUNTER — MYC MEDICAL ADVICE (OUTPATIENT)
Dept: RHEUMATOLOGY | Facility: CLINIC | Age: 34
End: 2024-07-13
Payer: COMMERCIAL

## 2024-07-13 DIAGNOSIS — H20.022 RECURRENT IRITIS, LEFT: Primary | ICD-10-CM

## 2024-07-29 NOTE — TELEPHONE ENCOUNTER
LM with patient to discuss that MTM pharmacist would reach out to discuss new medication with patient. Provided callback number if patient has any questions.    Margo Saxena RN

## 2024-07-31 ENCOUNTER — TELEPHONE (OUTPATIENT)
Dept: RHEUMATOLOGY | Facility: CLINIC | Age: 34
End: 2024-07-31
Payer: COMMERCIAL

## 2024-07-31 NOTE — TELEPHONE ENCOUNTER
MTM referral from: [facility] visit (referral by provider)    MTM referral outreach attempt #2 on July 31, 2024 at 10:53 AM      Outcome: Patient not reachable after several attempts, routed to Pharmacist Team/Provider as an FYI    Use hbc for the carrier/Plan on the flowsheet      [employer] Message Sent    Melissa Gonzalez CPhT  MTM

## 2024-08-05 NOTE — TELEPHONE ENCOUNTER
MTM Referral outreach attempt #3 was made on 08/05/2024.    Outcome: Sent patient MyChart message explaining more in-depth what MTM is and how we can best support them. Attached ability to schedule from message, as well as offered opportunity to call me directly for help with scheduling.

## 2024-08-08 RX ORDER — MELOXICAM 7.5 MG/1
7.5 TABLET ORAL DAILY
Qty: 30 TABLET | Refills: 1 | Status: SHIPPED | OUTPATIENT
Start: 2024-08-08 | End: 2024-08-08

## 2024-09-09 ENCOUNTER — MYC MEDICAL ADVICE (OUTPATIENT)
Dept: ORTHOPEDICS | Facility: CLINIC | Age: 34
End: 2024-09-09
Payer: COMMERCIAL

## 2024-09-09 DIAGNOSIS — M25.552 CHRONIC HIP PAIN, LEFT: Primary | ICD-10-CM

## 2024-09-09 DIAGNOSIS — G89.29 CHRONIC HIP PAIN, LEFT: Primary | ICD-10-CM

## 2024-10-02 ENCOUNTER — HOSPITAL ENCOUNTER (OUTPATIENT)
Dept: MRI IMAGING | Facility: CLINIC | Age: 34
Discharge: HOME | End: 2024-10-02
Attending: FAMILY MEDICINE | Admitting: FAMILY MEDICINE
Payer: COMMERCIAL

## 2024-10-02 DIAGNOSIS — M25.552 CHRONIC HIP PAIN, LEFT: ICD-10-CM

## 2024-10-02 DIAGNOSIS — G89.29 CHRONIC HIP PAIN, LEFT: ICD-10-CM

## 2024-10-02 PROCEDURE — 73721 MRI JNT OF LWR EXTRE W/O DYE: CPT | Mod: 26 | Performed by: RADIOLOGY

## 2024-10-02 PROCEDURE — 73721 MRI JNT OF LWR EXTRE W/O DYE: CPT | Mod: LT

## 2024-10-11 ENCOUNTER — OFFICE VISIT (OUTPATIENT)
Dept: ORTHOPEDICS | Facility: CLINIC | Age: 34
End: 2024-10-11
Payer: COMMERCIAL

## 2024-10-11 DIAGNOSIS — M25.552 CHRONIC HIP PAIN, LEFT: Primary | ICD-10-CM

## 2024-10-11 DIAGNOSIS — G89.29 CHRONIC HIP PAIN, LEFT: Primary | ICD-10-CM

## 2024-10-11 PROCEDURE — 99213 OFFICE O/P EST LOW 20 MIN: CPT | Performed by: FAMILY MEDICINE

## 2024-10-11 NOTE — PROGRESS NOTES
ESTABLISHED PATIENT FOLLOW-UP:  Pain of the Left Hip     HISTORY OF PRESENT ILLNESS  Ms. Robertson is a pleasant 34 year old year old female who presents to clinic today for follow-up of left hip pain.    Date of injury: march 2023  Date last seen: 4/14/23  Following Therapeutic Plan: yes   Pain: no change  Function: no change   Interval History: Was initially having lower back, gluteus and hip pain. Has really been working hard on rehab but still having a lot of pain. Physical therapy for 2 months with no relief.     Pain remains in the groin and medial thigh.     Additional medical/Social/Surgical histories reviewed in [facility] and updated as appropriate.    REVIEW OF SYSTEMS (10/11/2024)  CONSTITUTIONAL: Denies fever and weight loss  GASTROINTESTINAL: Denies abdominal pain, nausea, vomiting  MUSCULOSKELETAL: See HPI  SKIN: Denies any recent rash or lesion  NEUROLOGICAL: Denies numbness or focal weakness     PHYSICAL EXAM  There were no vitals taken for this visit.    General  - normal appearance, in no obvious distress  Musculoskeletal - left hip  - stance: normal gait without limp, no obvious leg length discrepancy   - inspection: no swelling or effusion, normal bone and joint alignment, no obvious deformity  - palpation: no lateral or anterior hip tenderness  - ROM: pain with flexion and internal rotation, normal extension, external rotation  - strength: 5/5 in all planes  - special tests:  (-) FABIEN  (+) FADIR  no pain with axial femoral load  Neuro  - no sensory or motor deficit, grossly normal coordination, normal muscle tone      IMAGING :   MRI HIP WO CON    Impression:     1. Focal at least moderate grade tear in the gluteus medius  trochanteric attachment.     2. Ischiofemoral space distance is in range of the lower normal limit;  although no substantial atrophy or edema of the quadratus femoris  muscle.     3. No substantial left hip degenerative change. No evidence of labral  tear on this nonarthrographic  study.     I have personally reviewed the examination and initial interpretation  and I agree with the findings.     ALEA SY MD (Joe)      ASSESSMENT & PLAN  Ms. Robertson is a 34 year old year old female who presents to clinic today with Pain of the Left Hip    MRI revealing findings including gluteal tendinopathy with tearing of left gluteus medius. Clinical findings remain highly suggestive of labrum or impingement pathology. No evidence for labrum pathology on imaging.  Low but possible consideration for ischiofemoral impingement - no edema to suggest active pathology however.      Refractory to PT course. No evidence today clinically to suggest pain relating to gluteus tear.    Diagnosis:   Chronic pain of left hip  Gluteus meningopathy of left hip    -Diagnostic intraarticular hip injection discussed with corticosteroid, LA only, and /or PRP.  We agreed to pursue corticosteroid/bupivacaine injection  -Renew PT course with one of our hip specialist physical therapists for CHYNA protocol, as well to optimize gluteus strength.  -Follow up: Procedure planned    It was a pleasure seeing Miracle.    Brian Ivan DO, CAM  Primary Care Sports Medicine

## 2024-10-11 NOTE — LETTER
10/11/2024      RE: Miracle Robertson  [address]  [facility] [address]     Dear Colleague,    Thank you for referring your patient, Miracle Robertson, to the [facility] [place]. Please see a copy of my visit note below.    ESTABLISHED PATIENT FOLLOW-UP:  Pain of the Left Hip     HISTORY OF PRESENT ILLNESS  Ms. Robertson is a pleasant 34 year old year old female who presents to clinic today for follow-up of left hip pain.    Date of injury: march 2023  Date last seen: 4/14/23  Following Therapeutic Plan: yes   Pain: no change  Function: no change   Interval History: Was initially having lower back, gluteus and hip pain. Has really been working hard on rehab but still having a lot of pain. Physical therapy for 2 months with no relief.     Pain remains in the groin and medial thigh.     Additional medical/Social/Surgical histories reviewed in [facility] and updated as appropriate.    REVIEW OF SYSTEMS (10/11/2024)  CONSTITUTIONAL: Denies fever and weight loss  GASTROINTESTINAL: Denies abdominal pain, nausea, vomiting  MUSCULOSKELETAL: See HPI  SKIN: Denies any recent rash or lesion  NEUROLOGICAL: Denies numbness or focal weakness     PHYSICAL EXAM  There were no vitals taken for this visit.    General  - normal appearance, in no obvious distress  Musculoskeletal - left hip  - stance: normal gait without limp, no obvious leg length discrepancy   - inspection: no swelling or effusion, normal bone and joint alignment, no obvious deformity  - palpation: no lateral or anterior hip tenderness  - ROM: pain with flexion and internal rotation, normal extension, external rotation  - strength: 5/5 in all planes  - special tests:  (-) FABIEN  (+) FADIR  no pain with axial femoral load  Neuro  - no sensory or motor deficit, grossly normal coordination, normal muscle tone      IMAGING :   MRI HIP WO CON    Impression:     1. Focal at least moderate grade tear in the gluteus medius  trochanteric  attachment.     2. Ischiofemoral space distance is in range of the lower normal limit;  although no substantial atrophy or edema of the quadratus femoris  muscle.     3. No substantial left hip degenerative change. No evidence of labral  tear on this nonarthrographic study.     I have personally reviewed the examination and initial interpretation  and I agree with the findings.     ALEA SY MD (Joe)      ASSESSMENT & PLAN  Ms. Robertson is a 34 year old year old female who presents to clinic today with Pain of the Left Hip    MRI revealing findings including gluteal tendinopathy with tearing of left gluteus medius. Clinical findings remain highly suggestive of labrum or impingement pathology. No evidence for labrum pathology on imaging.  Low but possible consideration for ischiofemoral impingement - no edema to suggest active pathology however.      Refractory to PT course. No evidence today clinically to suggest pain relating to gluteus tear.    Diagnosis:   Chronic pain of left hip  Gluteus meningopathy of left hip    -Diagnostic intraarticular hip injection discussed with corticosteroid, LA only, and /or PRP.  We agreed to pursue corticosteroid/bupivacaine injection  -Renew PT course with one of our hip specialist physical therapists for CHYNA protocol, as well to optimize gluteus strength.  -Follow up: Procedure planned    It was a pleasure seeing Miracle.    Brian Ivan DO, [facility]  Primary Care Sports Medicine      Again, thank you for allowing me to participate in the care of your patient.      Sincerely,    Brian Ivan DO

## 2024-10-23 ENCOUNTER — THERAPY VISIT (OUTPATIENT)
Dept: PHYSICAL THERAPY | Facility: CLINIC | Age: 34
End: 2024-10-23
Payer: COMMERCIAL

## 2024-10-23 DIAGNOSIS — S76.012A TEAR OF LEFT GLUTEUS MEDIUS TENDON: ICD-10-CM

## 2024-10-23 DIAGNOSIS — M25.852 FEMOROACETABULAR IMPINGEMENT OF LEFT HIP: ICD-10-CM

## 2024-10-23 DIAGNOSIS — M25.559 HIP PAIN, UNSPECIFIED LATERALITY: Primary | ICD-10-CM

## 2024-10-23 PROCEDURE — 97140 MANUAL THERAPY 1/> REGIONS: CPT | Mod: GP | Performed by: PHYSICAL THERAPIST

## 2024-10-23 PROCEDURE — 97110 THERAPEUTIC EXERCISES: CPT | Mod: GP | Performed by: PHYSICAL THERAPIST

## 2024-10-23 PROCEDURE — 97161 PT EVAL LOW COMPLEX 20 MIN: CPT | Mod: GP | Performed by: PHYSICAL THERAPIST

## 2024-10-23 ASSESSMENT — ACTIVITIES OF DAILY LIVING (ADL)
PUTTING_ON_SOCKS_AND_SHOES: NO DIFFICULTY AT ALL
STANDING_FOR_15_MINUTES: SLIGHT DIFFICULTY
GETTING INTO AND OUT OF AN AVERAGE CAR: NO DIFFICULTY AT ALL
STEPPING_UP_AND_DOWN_CURBS: SLIGHT DIFFICULTY
PUTTING ON SOCKS AND SHOES: NO DIFFICULTY AT ALL
ROLLING OVER IN BED: MODERATE DIFFICULTY
GOING UP 1 FLIGHT OF STAIRS: NO DIFFICULTY AT ALL
ADL_HIGHEST_POTENTIAL_SCORE: 68
HOW_WOULD_YOU_RATE_YOUR_CURRENT_LEVEL_OF_FUNCTION?: NEARLY NORMAL
WALKING_DOWN_STEEP_HILLS: SLIGHT DIFFICULTY
SITTING_FOR_15_MINUTES: MODERATE DIFFICULTY
TWISTING/PIVOTING_ON_INVOLVED_LEG: SLIGHT DIFFICULTY
STARTING_AND_STOPPING_QUICKLY: SLIGHT DIFFICULTY
GETTING_INTO_AND_OUT_OF_AN_AVERAGE_CAR: NO DIFFICULTY AT ALL
WALKING_INITIALLY: NO DIFFICULTY AT ALL
WALKING_15_MINUTES_OR_GREATER: NO DIFFICULTY AT ALL
GOING DOWN 1 FLIGHT OF STAIRS: SLIGHT DIFFICULTY
HOS_ADL_SCORE(%): 80.88
GOING_DOWN_1_FLIGHT_OF_STAIRS: SLIGHT DIFFICULTY
CUTTING/LATERAL_MOVEMENTS: SLIGHT DIFFICULTY
DEEP_SQUATTING: MODERATE DIFFICULTY
STEPPING UP AND DOWN CURBS: SLIGHT DIFFICULTY
RUNNING_ONE_MILE: SLIGHT DIFFICULTY
ADL_SCORE(%): 0
STANDING FOR 15 MINUTES: SLIGHT DIFFICULTY
LOW_IMPACT_ACTIVITIES_LIKE_FAST_WALKING: NO DIFFICULTY AT ALL
HOW_WOULD_YOU_RATE_YOUR_CURRENT_LEVEL_OF_FUNCTION_DURING_YOUR_SPORTS_RELATED_ACTIVITIES_FROM_0_TO_100_WITH_100_BEING_YOUR_LEVEL_OF_FUNCTION_PRIOR_TO_YOUR_HIP_PROBLEM_AND_0_BEING_THE_INABILITY_TO_PERFORM_ANY_OF_YOUR_USUAL_DAILY_ACTIVITIES?: 50
RECREATIONAL_ACTIVITIES: SLIGHT DIFFICULTY
RECREATIONAL ACTIVITIES: SLIGHT DIFFICULTY
TWISTING/PIVOTING ON INVOLVED LEG: SLIGHT DIFFICULTY
WALKING_FOR_APPROXIMATELY_10_MINUTES: NO DIFFICULTY AT ALL
PLEASE_INDICATE_YOR_PRIMARY_REASON_FOR_REFERRAL_TO_THERAPY:: HIP
JUMPING: SLIGHT DIFFICULTY
WALKING_UP_STEEP_HILLS: SLIGHT DIFFICULTY
SPORTS_COUNT: 9
ADL_TOTAL_ITEM_SCORE: 0
LIGHT_TO_MODERATE_WORK: NO DIFFICULTY AT ALL
GETTING_INTO_AND_OUT_OF_A_BATHTUB: SLIGHT DIFFICULTY
HOW_WOULD_YOU_RATE_YOUR_CURRENT_LEVEL_OF_FUNCTION_DURING_YOUR_USUAL_ACTIVITIES_OF_DAILY_LIVING_FROM_0_TO_100_WITH_100_BEING_YOUR_LEVEL_OF_FUNCTION_PRIOR_TO_YOUR_HIP_PROBLEM_AND_0_BEING_THE_INABILITY_TO_PERFORM_ANY_OF_YOUR_USUAL_DAILY_ACTIVITIES?: 60
GOING_UP_1_FLIGHT_OF_STAIRS: NO DIFFICULTY AT ALL
ADL_COUNT: 17
ROLLING_OVER_IN_BED: MODERATE DIFFICULTY
WALKING_INITIALLY: NO DIFFICULTY AT ALL
DEEP SQUATTING: MODERATE DIFFICULTY
GETTING_INTO_AND_OUT_OF_A_BATHTUB: SLIGHT DIFFICULTY
SPORTS_TOTAL_ITEM_SCORE: 0
HOS_ADL_ITEM_SCORE_TOTAL: 55
WALKING_DOWN_STEEP_HILLS: SLIGHT DIFFICULTY
HEAVY_WORK: SLIGHT DIFFICULTY
SITTING FOR 15 MINUTES: MODERATE DIFFICULTY
LANDING: SLIGHT DIFFICULTY
SPORTS_SCORE(%): 0
WALKING_15_MINUTES_OR_GREATER: NO DIFFICULTY AT ALL
ABILITY_TO_PERFORM_ACTIVITY_WITH_YOUR_NORMAL_TECHNIQUE: SLIGHT DIFFICULTY
HOW_WOULD_YOU_RATE_YOUR_CURRENT_LEVEL_OF_FUNCTION_DURING_YOUR_USUAL_ACTIVITIES_OF_DAILY_LIVING_FROM_0_TO_100_WITH_100_BEING_YOUR_LEVEL_OF_FUNCTION_PRIOR_TO_YOUR_HIP_PROBLEM_AND_0_BEING_THE_INABILITY_TO_PERFORM_ANY_OF_YOUR_USUAL_DAILY_ACTIVITIES?: 60
WALKING_APPROXIMATELY_10_MINUTES: NO DIFFICULTY AT ALL
ABILITY_TO_PARTICIPATE_IN_YOUR_DESIRED_SPORT_AS_LONG_AS_YOU_WOULD_LIKE: MODERATE DIFFICULTY
WALKING_UP_STEEP_HILLS: SLIGHT DIFFICULTY
LIGHT_TO_MODERATE_WORK: NO DIFFICULTY AT ALL
SPORTS_HIGHEST_POTENTIAL_SCORE: 36
HOS_ADL_HIGHEST_POTENTIAL_SCORE: 68
HEAVY_WORK: SLIGHT DIFFICULTY

## 2024-10-23 NOTE — PROGRESS NOTES
PHYSICAL THERAPY EVALUATION  Type of Visit: Evaluation              Subjective       Presenting condition or subjective complaint: (Patient-Rptd) chronic left groin upper hip pain & moderate glute medius tear. Initial onset March 2023- deep groin, post hip/QL pain which was limiting activity and sleep. At that time tried oral steroid and PT. Tried PT with marginal benefit. Background in strength training so she transitioned to self treating to focus on hip abd and add strengthening. Now worst with sitting for prolonged periods- activity doesn't worsen her pain but also not improving.   Date of onset: 10/11/24    Relevant medical history: (Patient-Rptd) Migraines or headaches; Pain at night or rest   Dates & types of surgery:      Prior diagnostic imaging/testing results: (Patient-Rptd) MRI; X-ray     Prior therapy history for the same diagnosis, illness or injury: (Patient-Rptd) Yes (Patient-Rptd) april may 2023 physical therapy    Living Environment  Social support: (Patient-Rptd) With a significant other or spouse   Type of home: (Patient-Rptd) House   Stairs to enter the home: (Patient-Rptd) No       Ramp: (Patient-Rptd) No   Stairs inside the home: (Patient-Rptd) Yes (Patient-Rptd) 8 Is there a railing: (Patient-Rptd) No     Help at home: (Patient-Rptd) None  Equipment owned:       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  Hobbies/Interests: (Patient-Rptd) hiking walks camping television trying new activities    Patient goals for therapy: (Patient-Rptd) pain free sitting,deep squats, longer triathlons, run prolong period,pain free sleeping    Pain assessment: See objective evaluation for additional pain details     Objective   PAIN:   Pain Location: deep groin pain  Pain Quality: Aching and Nagging  Pain Frequency: intermittent  Pain is Exacerbated By: prolonged sitting, prolonged impact activity  Pain is Relieved By: stretch and self adjustment,   Standing Alignment:         Lumbar:  Anterior pelvic tilt  Pelvic:  Iliac crest high L          Gait:       Weight Bearing Status:  WBAT     Deviations:  Lumbar:  Trunk lean LHip:  Excessive flexion L and hip hiking L                                               Hip Evaluation    Hip PROM:  Hip PROM:  Left Hip:     Right Hip:  Normal: L hip PROM inc pain end range hip flex; Flex/ER and FlexIR excursion L hip wnl.                          Hip Strength:    Flexion:   Left:  4+/5    Pain:  Right:  5/5    Pain:                    Extension:  Left:  5/5   Pain:Right:  5/5     Pain:    Abduction:  Left:  4-/5    ++   Pain:weak/painfulRight:  5/5     Pain:  Adduction:  Left: /5     Pain:weak/painful                Hip Special Testing:    Left hip positive for the following special tests:  Fadir/Labrum; Distraction and Marcos (+ for hip flexor restriction L hip)  Left hip negative for the following special tests:  SLR  Right hip negative for the following special tests:  SLR    Hip Palpation:    Left hip tenderness present at:   hip flexors (ttp rectus insertion, inguinal lig, add insertion); Adductors; Iliac Crest and Gluteus Medius  Left hip tenderness not present at:  Greater Trachanter or Bursa    Functional Testing:           Quad:      Bilateral leg squat:   Mild loss of control     Proprioception:    Stork balance test:    Left:    Compensated trendelenberg  Right:  Wnl  % of Uninvolved:                 Assessment & Plan   CLINICAL IMPRESSIONS  Medical Diagnosis: L hip pain; glute med tear    Treatment Diagnosis: L hip pain; glute med tear   Impression/Assessment: Patient is a 34 year old female with L Hip complaints.  The following significant findings have been identified: Pain, Decreased ROM/flexibility, Decreased proprioception, Impaired muscle performance, and Decreased activity tolerance. These impairments interfere with their ability to perform self care tasks, recreational activities, household chores, driving , household  mobility, and community mobility as compared to previous level of function.     Clinical Decision Making (Complexity):  Clinical Presentation: Stable/Uncomplicated  Clinical Presentation Rationale: based on medical and personal factors listed in PT evaluation  Clinical Decision Making (Complexity): Low complexity    PLAN OF CARE  Treatment Interventions:  Interventions: Gait Training, Manual Therapy, Neuromuscular Re-education, Therapeutic Activity, Therapeutic Exercise, Self-Care/Home Management    Long Term Goals     PT Goal 1  Goal Identifier: Ambulation  Goal Description: unrestricted amb at EOD w/o limitation d/t L hip pain  Rationale: to maximize safety and independence within the home;to maximize safety and independence within the community;to maximize safety and independence with self cares;to maximize safety and independence with performance of ADLs and functional tasks  Target Date: 01/21/25      Frequency of Treatment: wkly to bi-monthly  Duration of Treatment: up to 90 d    Education Assessment:   Learner/Method: Patient  Education Comments: edu on clinical findings and prognosis- discussed volume of activity and specificity of activty    Risks and benefits of evaluation/treatment have been explained.   Patient/Family/caregiver agrees with Plan of Care.     Evaluation Time:     PT Eval, Low Complexity Minutes (24509): 15  Signing Clinician: Maryuri Hancock PT

## 2024-11-05 ENCOUNTER — OFFICE VISIT (OUTPATIENT)
Dept: ORTHOPEDICS | Facility: CLINIC | Age: 34
End: 2024-11-05
Payer: COMMERCIAL

## 2024-11-05 DIAGNOSIS — M25.552 CHRONIC HIP PAIN, LEFT: Primary | ICD-10-CM

## 2024-11-05 DIAGNOSIS — G89.29 CHRONIC HIP PAIN, LEFT: Primary | ICD-10-CM

## 2024-11-05 PROCEDURE — 20611 DRAIN/INJ JOINT/BURSA W/US: CPT | Mod: LT | Performed by: FAMILY MEDICINE

## 2024-11-05 RX ORDER — TRIAMCINOLONE ACETONIDE 40 MG/ML
40 INJECTION, SUSPENSION INTRA-ARTICULAR; INTRAMUSCULAR
Status: COMPLETED | OUTPATIENT
Start: 2024-11-05 | End: 2024-11-05

## 2024-11-05 RX ORDER — BUPIVACAINE HYDROCHLORIDE 5 MG/ML
4 INJECTION, SOLUTION EPIDURAL; INTRACAUDAL
Status: COMPLETED | OUTPATIENT
Start: 2024-11-05 | End: 2024-11-05

## 2024-11-05 RX ORDER — LIDOCAINE HYDROCHLORIDE 10 MG/ML
3 INJECTION, SOLUTION EPIDURAL; INFILTRATION; INTRACAUDAL; PERINEURAL
Status: COMPLETED | OUTPATIENT
Start: 2024-11-05 | End: 2024-11-05

## 2024-11-05 RX ADMIN — LIDOCAINE HYDROCHLORIDE 3 ML: 10 INJECTION, SOLUTION EPIDURAL; INFILTRATION; INTRACAUDAL; PERINEURAL at 16:40

## 2024-11-05 RX ADMIN — BUPIVACAINE HYDROCHLORIDE 4 ML: 5 INJECTION, SOLUTION EPIDURAL; INTRACAUDAL at 16:40

## 2024-11-05 RX ADMIN — TRIAMCINOLONE ACETONIDE 40 MG: 40 INJECTION, SUSPENSION INTRA-ARTICULAR; INTRAMUSCULAR at 16:40

## 2024-11-05 NOTE — PROGRESS NOTES
Large Joint Injection: L hip joint    Date/Time: 11/5/2024 4:40 PM    Performed by: Brian Ivan DO  Authorized by: Brian Ivan DO    Indications:  Pain and osteoarthritis  Needle Size:  22 G  Guidance: ultrasound    Approach:  Anterior  Location:  Hip      Site:  L hip joint  Medications:  40 mg triamcinolone 40 MG/ML; 4 mL BUPivacaine (PF) 0.5 %; 3 mL lidocaine (PF) 1 %  Outcome:  Tolerated well, no immediate complications  Procedure discussed: discussed risks, benefits, and alternatives    Consent Given by:  Patient  Timeout: timeout called immediately prior to procedure    Prep: patient was prepped and draped in usual sterile fashion         Steve Foster M.A., LAT, ATC  Certified Athletic Trainer

## 2024-11-05 NOTE — LETTER
2024      RE: Miracle Robertson  [address]  [facility] [address]     Dear Colleague,    Thank you for referring your patient, Miracle Robertson, to the [facility] [place]. Please see a copy of my visit note below.    PROCEDURE ENCOUNTER    [facility]  Orthopedics  Brian Ivan DO  2024     Name: Miracle Robertson  MRN: [id number]  Age: 34 year old  : 1990    Diagnosis: Chronic pain of left hip    Intraarticular Hip Injection - Ultrasound Guided  The patient was informed of the risks and the benefits of the procedure and a written consent was signed.  The patient s left hip was prepped with chlorhexidine in sterile fashion.   Local anesthesia was performed using a 27-gauge 1.5-inch needle to administer 3 mL of 1% lidocaine without epinephrine.  40 mg of triamcinolone suspension was drawn up into a 5 mL syringe with 4 mL of 1% lidocaine.  Injection was performed using sterile technique.  Under ultrasound guidance a 3.5-inch 22-gauge needle was used to enter the left femoracetabular joint.  Anterior approach was used, needle placement was visualized and documented with ultrasound.  Ultrasound visualization was necessary due to decreased joint space in the setting of osteoarthritis.  Injection performed long axis to the probe.  Injection solution visualized within the joint space.  Images were permanently stored for the patient's record.  There were no complications. The patient tolerated the procedure well. There was negligible bleeding.   Miracle to perform bodyweight squats / lunges today for symptom diary.  Additional modified workout in 2 days. Follow up with symptom diary in 1 week.   Continue with PT - working diagnosis remains hip impingement syndrome  Brian Ivan DO CAQSM  Primary Care Sports Medicine  [facility]      Large Joint Injection: L hip joint    Date/Time: 2024 4:40 PM    Performed by: Moncho  Brian CAMPA DO  Authorized by: Brian Ivan DO    Indications:  Pain and osteoarthritis  Needle Size:  22 G  Guidance: ultrasound    Approach:  Anterior  Location:  Hip      Site:  L hip joint  Medications:  40 mg triamcinolone 40 MG/ML; 4 mL BUPivacaine (PF) 0.5 %; 3 mL lidocaine (PF) 1 %  Outcome:  Tolerated well, no immediate complications  Procedure discussed: discussed risks, benefits, and alternatives    Consent Given by:  Patient  Timeout: timeout called immediately prior to procedure    Prep: patient was prepped and draped in usual sterile fashion         Steve Foster M.A., LAT, ATC  Certified Athletic Trainer            Again, thank you for allowing me to participate in the care of your patient.      Sincerely,    Brian Ivan DO

## 2024-11-05 NOTE — PROGRESS NOTES
PROCEDURE ENCOUNTER    [facility]  Orthopedics  Brian Ivan DO  2024     Name: Miracle Robertson  MRN: [id number]  Age: 34 year old  : 1990    Diagnosis: Chronic pain of left hip    Intraarticular Hip Injection - Ultrasound Guided  The patient was informed of the risks and the benefits of the procedure and a written consent was signed.  The patient s left hip was prepped with chlorhexidine in sterile fashion.   Local anesthesia was performed using a 27-gauge 1.5-inch needle to administer 3 mL of 1% lidocaine without epinephrine.  40 mg of triamcinolone suspension was drawn up into a 5 mL syringe with 4 mL of 1% lidocaine.  Injection was performed using sterile technique.  Under ultrasound guidance a 3.5-inch 22-gauge needle was used to enter the left femoracetabular joint.  Anterior approach was used, needle placement was visualized and documented with ultrasound.  Ultrasound visualization was necessary due to decreased joint space in the setting of osteoarthritis.  Injection performed long axis to the probe.  Injection solution visualized within the joint space.  Images were permanently stored for the patient's record.  There were no complications. The patient tolerated the procedure well. There was negligible bleeding.   Miracle to perform bodyweight squats / lunges today for symptom diary.  Additional modified workout in 2 days. Follow up with symptom diary in 1 week.   Continue with PT - working diagnosis remains hip impingement syndrome  Brian Ivan DO CAQSM  Primary Care Sports Medicine  [facility]

## 2024-11-05 NOTE — NURSING NOTE
[address] [address]  Dept: [phone]  ______________________________________________________________________________    Patient: Miracle Robertson   : 1990   MRN: [id number]   2024    INVASIVE PROCEDURE SAFETY CHECKLIST    Date:  24  Procedure: Left hip IA USG CSI  Patient Name: Miracle Robertson  MRN: [id number]  YOB: 1990    Action: Complete sections as appropriate. Any discrepancy results in a HARD COPY until resolved.     PRE PROCEDURE:  Patient ID verified with 2 identifiers (name and  or MRN): Yes  Procedure and site verified with patient/designee (when able): Yes  Accurate consent documentation in medical record: Yes  H&P (or appropriate assessment) documented in medical record: Yes  H&P must be up to 20 days prior to procedure and updates within 24 hours of procedure as applicable: NA  Relevant diagnostic and radiology test results appropriately labeled and displayed as applicable: Yes  Procedure site(s) marked with provider initials: NA    TIMEOUT:  Time-Out performed immediately prior to starting procedure, including verbal and active participation of all team members addressing the following:Yes  * Correct patient identify  * Confirmed that the correct side and site are marked  * An accurate procedure consent form  * Agreement on the procedure to be done  * Correct patient position  * Relevant images and results are properly labeled and appropriately displayed  * The need to administer antibiotics or fluids for irrigation purposes during the procedure as applicable   * Safety precautions based on patient history or medication use    DURING PROCEDURE: Verification of correct person, site, and procedures any time the responsibility for care of the patient is transferred to another member of the care team.       Prior to injection, verified patient identity using patient's name and date of birth.  Due  to injection administration, patient instructed to remain in clinic for 15 minutes  afterwards, and to report any adverse reaction to me immediately.    Joint injection was performed.      Lidocaine Amount Wasted:  Yes: 1 mg/ml   Vial/Syringe: Multi dose vial  Expiration Date:  4/1/28      Bupivacaine Amount Wasted:  Yes: 6 mg/ml   Vial/Syringe: Multi dose vial  Expiration Date:  7/1/26    Steve Foster, [facility]  November 5, 2024

## 2024-11-06 ENCOUNTER — THERAPY VISIT (OUTPATIENT)
Dept: PHYSICAL THERAPY | Facility: CLINIC | Age: 34
End: 2024-11-06
Payer: COMMERCIAL

## 2024-11-06 DIAGNOSIS — M25.559 HIP PAIN, UNSPECIFIED LATERALITY: ICD-10-CM

## 2024-11-06 DIAGNOSIS — S76.012A TEAR OF LEFT GLUTEUS MEDIUS TENDON, INITIAL ENCOUNTER: ICD-10-CM

## 2024-11-06 DIAGNOSIS — M25.852 FEMOROACETABULAR IMPINGEMENT OF LEFT HIP: Primary | ICD-10-CM

## 2024-11-06 PROCEDURE — 97140 MANUAL THERAPY 1/> REGIONS: CPT | Mod: GP | Performed by: PHYSICAL THERAPIST

## 2024-11-06 PROCEDURE — 97110 THERAPEUTIC EXERCISES: CPT | Mod: GP | Performed by: PHYSICAL THERAPIST

## 2024-11-12 DIAGNOSIS — Z30.41 ENCOUNTER FOR SURVEILLANCE OF CONTRACEPTIVE PILLS: ICD-10-CM

## 2024-11-12 RX ORDER — DESOGESTREL AND ETHINYL ESTRADIOL 0.15-0.03
1 KIT ORAL DAILY
Qty: 84 TABLET | Refills: 0 | Status: SHIPPED | OUTPATIENT
Start: 2024-11-12

## 2024-11-13 ENCOUNTER — THERAPY VISIT (OUTPATIENT)
Dept: PHYSICAL THERAPY | Facility: CLINIC | Age: 34
End: 2024-11-13
Payer: COMMERCIAL

## 2024-11-13 DIAGNOSIS — S76.012A TEAR OF LEFT GLUTEUS MEDIUS TENDON, INITIAL ENCOUNTER: ICD-10-CM

## 2024-11-13 DIAGNOSIS — M25.852 FEMOROACETABULAR IMPINGEMENT OF LEFT HIP: Primary | ICD-10-CM

## 2024-11-13 DIAGNOSIS — M25.559 HIP PAIN, UNSPECIFIED LATERALITY: ICD-10-CM

## 2024-11-13 PROCEDURE — 97140 MANUAL THERAPY 1/> REGIONS: CPT | Mod: GP | Performed by: PHYSICAL THERAPIST

## 2024-11-13 PROCEDURE — 97112 NEUROMUSCULAR REEDUCATION: CPT | Mod: GP | Performed by: PHYSICAL THERAPIST

## 2024-11-13 PROCEDURE — 97110 THERAPEUTIC EXERCISES: CPT | Mod: GP | Performed by: PHYSICAL THERAPIST

## 2024-11-14 ENCOUNTER — OFFICE VISIT (OUTPATIENT)
Dept: ORTHOPEDICS | Facility: CLINIC | Age: 34
End: 2024-11-14
Payer: COMMERCIAL

## 2024-11-14 ENCOUNTER — ANCILLARY PROCEDURE (OUTPATIENT)
Dept: GENERAL RADIOLOGY | Facility: CLINIC | Age: 34
End: 2024-11-14
Attending: PHYSICIAN ASSISTANT
Payer: COMMERCIAL

## 2024-11-14 VITALS
WEIGHT: 157 LBS | BODY MASS INDEX: 26.16 KG/M2 | DIASTOLIC BLOOD PRESSURE: 72 MMHG | SYSTOLIC BLOOD PRESSURE: 118 MMHG | HEIGHT: 65 IN

## 2024-11-14 DIAGNOSIS — M79.672 ACUTE FOOT PAIN, LEFT: ICD-10-CM

## 2024-11-14 DIAGNOSIS — M79.672 ACUTE FOOT PAIN, LEFT: Primary | ICD-10-CM

## 2024-11-14 PROCEDURE — 73630 X-RAY EXAM OF FOOT: CPT | Mod: TC | Performed by: RADIOLOGY

## 2024-11-14 PROCEDURE — 99203 OFFICE O/P NEW LOW 30 MIN: CPT | Performed by: PHYSICIAN ASSISTANT

## 2024-11-14 NOTE — PROGRESS NOTES
"ASSESSMENT & PLAN  Miracle Robertson is seen today for her left foot pain.  We discussed that her symptoms are consistent more than her plantar fascia.  Discussed to modify her activities and to start utilizing Tylenol and ibuprofen as well as topical creams for pain.  She can continue to use ice and heat.  She was given some recommendations for other types of foot inserts that she may do to help with her symptoms.  Exercises printed out for her today.  We discussed that should her symptoms fail to improve or worsen to follow-up with her podiatry staff for further evaluation and treatment.  She was understanding of this and agreement.  All questions answered.    [place] [facility]    -----  Chief Complaint   Patient presents with    Left Foot - Pain       SUBJECTIVE  Miracle Robertson is a/an 34 year old female who is seen as a WALK IN patient for evaluation of left foot.  Onset was 11/09/2024. Pain is located left foot plantar surface; possible over use on peloton bike, heavy training and pickle ball. Symptoms are worsened by putting shoes on, walking and weight bearing.  She has tried ice and elevation. She did buy some shoe inserts but notes pain with this. Associated symptoms include  feeling of instability, tingling, and redness/warmth .    The patient is seen alone    Prior injury/Surgical history of affected joint: hip and stress fracture in the past  Social History/Occupation: strength     REVIEW OF SYSTEMS:  Pertinent positives/negative: As stated above in HPI    OBJECTIVE:  /72   Ht 1.651 m (5' 5\")   Wt 71.2 kg (157 lb)   BMI 26.13 kg/m     General: Alert and in no distress  Skin: no visable rashes  CV: Extremities appear well perfused   Resp: normal respiratory effort, no conversational dyspnea   Psych: normal mood, affect  MSK: Left ankle/foot exam:    Inspection:  There is no evidence of open wounds.   There is no evidence of erythema or " infection  There is no appreciable swelling or synovitis.    Palpation:  There is no palpable fluctuance  There is tenderness to palpation at plantar fasica region  There is no tenderness to palpation at achilles, base of the fifth metatarsal    Strength:  Dorsiflexion: 5/5  Plantarflexion: 5/5  Inversion: 5/5  Eversion: 5/5    Sensory:  Intact to light touch    Dorsalis pedis pulse is palpable and equal to contralateral side    Range of Motion:  Flexion: 20 degrees  Extension: 50 degrees    Examination Maneuvers:  Negative caban sign  Negative homans sign         RADIOLOGY:  Final results and radiologist's interpretation available in the [facility] health record.  Images below were personally reviewed and discussed with the patient in the office today.  My personal interpretation of the performed imaging: Left foot x-rays showed no fracture.

## 2024-11-14 NOTE — PATIENT INSTRUCTIONS
1. Acute foot pain, left        - Left foot examine + for plantar fascia pain. Likely an overuse condition from recent exercise change. Imaging reassuring for no obvious fracture  Recommendations  - OTC Tylenol 1000 mg and Ibuprofen 600 mg every 8 hours for pain.   -Topical creams such as Voltaren gel, Asperecreme, Biofreeze, china gel, or blue emu.   - Apply ice first 72 hours post injury, then alternate heat and ice  -Compressive wrap  -Custom orthotics  -Plantar exercises  -Follow up with Dr Ivan or Podiatry if symptoms worsen or fail to improve    -Call direct clinic number [[phone]] at any time with questions or concerns.    -Orthopedic Walk in Monday through Thursday 8 am to 6 pm, Friday 8 am to 5 pm, Saturday 8 am to 12 pm at [address] [facility] [address] [place].

## 2024-11-14 NOTE — LETTER
"11/14/2024      Miracle Robertson  [address]  [facility] [address]      Dear Colleague,    Thank you for referring your patient, Miracle Robertson, to the [facility] [place]. Please see a copy of my visit note below.    ASSESSMENT & PLAN  Miracle Robertson is seen today for her left foot pain.  We discussed that her symptoms are consistent more than her plantar fascia.  Discussed to modify her activities and to start utilizing Tylenol and ibuprofen as well as topical creams for pain.  She can continue to use ice and heat.  She was given some recommendations for other types of foot inserts that she may do to help with her symptoms.  Exercises printed out for her today.  We discussed that should her symptoms fail to improve or worsen to follow-up with her podiatry staff for further evaluation and treatment.  She was understanding of this and agreement.  All questions answered.    Florida Roldan PA-C  [facility]    -----  Chief Complaint   Patient presents with     Left Foot - Pain       SUBJECTIVE  Miracle Robertson is a/an 34 year old female who is seen as a WALK IN patient for evaluation of left foot.  Onset was 11/09/2024. Pain is located left foot plantar surface; possible over use on peloton bike, heavy training and pickle ball. Symptoms are worsened by putting shoes on, walking and weight bearing.  She has tried ice and elevation. She did buy some shoe inserts but notes pain with this. Associated symptoms include  feeling of instability, tingling, and redness/warmth .    The patient is seen alone    Prior injury/Surgical history of affected joint: hip and stress fracture in the past  Social History/Occupation: strength     REVIEW OF SYSTEMS:  Pertinent positives/negative: As stated above in HPI    OBJECTIVE:  /72   Ht 1.651 m (5' 5\")   Wt 71.2 kg (157 lb)   BMI 26.13 kg/m     General: Alert and in no distress  Skin: no visable " rashes  CV: Extremities appear well perfused   Resp: normal respiratory effort, no conversational dyspnea   Psych: normal mood, affect  MSK: Left ankle/foot exam:    Inspection:  There is no evidence of open wounds.   There is no evidence of erythema or infection  There is no appreciable swelling or synovitis.    Palpation:  There is no palpable fluctuance  There is tenderness to palpation at plantar fasica region  There is no tenderness to palpation at achilles, base of the fifth metatarsal    Strength:  Dorsiflexion: 5/5  Plantarflexion: 5/5  Inversion: 5/5  Eversion: 5/5    Sensory:  Intact to light touch    Dorsalis pedis pulse is palpable and equal to contralateral side    Range of Motion:  Flexion: 20 degrees  Extension: 50 degrees    Examination Maneuvers:  Negative caban sign  Negative homans sign         RADIOLOGY:  Final results and radiologist's interpretation available in the [facility] health record.  Images below were personally reviewed and discussed with the patient in the office today.  My personal interpretation of the performed imaging: Left foot x-rays showed no fracture.        Again, thank you for allowing me to participate in the care of your patient.        Sincerely,        Florida Roldan PA-C

## 2024-12-04 ENCOUNTER — THERAPY VISIT (OUTPATIENT)
Dept: PHYSICAL THERAPY | Facility: CLINIC | Age: 34
End: 2024-12-04
Payer: COMMERCIAL

## 2024-12-04 DIAGNOSIS — M25.852 FEMOROACETABULAR IMPINGEMENT OF LEFT HIP: Primary | ICD-10-CM

## 2024-12-04 DIAGNOSIS — M25.559 HIP PAIN, UNSPECIFIED LATERALITY: ICD-10-CM

## 2024-12-04 DIAGNOSIS — S76.012D TEAR OF LEFT GLUTEUS MEDIUS TENDON, SUBSEQUENT ENCOUNTER: ICD-10-CM

## 2024-12-04 PROCEDURE — 97530 THERAPEUTIC ACTIVITIES: CPT | Mod: GP | Performed by: PHYSICAL THERAPIST

## 2024-12-04 PROCEDURE — 97110 THERAPEUTIC EXERCISES: CPT | Mod: GP | Performed by: PHYSICAL THERAPIST

## 2024-12-11 ENCOUNTER — THERAPY VISIT (OUTPATIENT)
Dept: PHYSICAL THERAPY | Facility: CLINIC | Age: 34
End: 2024-12-11
Payer: COMMERCIAL

## 2024-12-11 DIAGNOSIS — M25.559 HIP PAIN, UNSPECIFIED LATERALITY: ICD-10-CM

## 2024-12-11 DIAGNOSIS — S76.012D TEAR OF LEFT GLUTEUS MEDIUS TENDON, SUBSEQUENT ENCOUNTER: ICD-10-CM

## 2024-12-11 DIAGNOSIS — M25.852 FEMOROACETABULAR IMPINGEMENT OF LEFT HIP: Primary | ICD-10-CM

## 2024-12-11 PROCEDURE — 97140 MANUAL THERAPY 1/> REGIONS: CPT | Mod: GP | Performed by: PHYSICAL THERAPIST

## 2024-12-11 PROCEDURE — 97110 THERAPEUTIC EXERCISES: CPT | Mod: GP | Performed by: PHYSICAL THERAPIST

## 2024-12-12 ENCOUNTER — ANCILLARY PROCEDURE (OUTPATIENT)
Dept: MRI IMAGING | Facility: CLINIC | Age: 34
End: 2024-12-12
Attending: FAMILY MEDICINE
Payer: COMMERCIAL

## 2024-12-12 DIAGNOSIS — M54.50 LOW BACK PAIN, UNSPECIFIED BACK PAIN LATERALITY, UNSPECIFIED CHRONICITY, UNSPECIFIED WHETHER SCIATICA PRESENT: ICD-10-CM

## 2024-12-12 DIAGNOSIS — R10.30 GROIN PAIN, UNSPECIFIED LATERALITY: ICD-10-CM

## 2024-12-12 DIAGNOSIS — M25.559 HIP PAIN, UNSPECIFIED LATERALITY: ICD-10-CM

## 2024-12-12 DIAGNOSIS — M54.16 LUMBAR BACK PAIN WITH RADICULOPATHY AFFECTING LEFT LOWER EXTREMITY: ICD-10-CM

## 2024-12-12 PROCEDURE — 72148 MRI LUMBAR SPINE W/O DYE: CPT

## 2024-12-18 ENCOUNTER — THERAPY VISIT (OUTPATIENT)
Dept: PHYSICAL THERAPY | Facility: CLINIC | Age: 34
End: 2024-12-18
Payer: COMMERCIAL

## 2024-12-18 DIAGNOSIS — M25.559 HIP PAIN, UNSPECIFIED LATERALITY: ICD-10-CM

## 2024-12-18 DIAGNOSIS — M25.852 FEMOROACETABULAR IMPINGEMENT OF LEFT HIP: Primary | ICD-10-CM

## 2024-12-18 DIAGNOSIS — S76.012D TEAR OF LEFT GLUTEUS MEDIUS TENDON, SUBSEQUENT ENCOUNTER: ICD-10-CM

## 2024-12-18 PROCEDURE — 97140 MANUAL THERAPY 1/> REGIONS: CPT | Mod: GP | Performed by: PHYSICAL THERAPIST

## 2024-12-18 PROCEDURE — 97110 THERAPEUTIC EXERCISES: CPT | Mod: GP | Performed by: PHYSICAL THERAPIST

## 2024-12-26 ENCOUNTER — TELEPHONE (OUTPATIENT)
Dept: OPHTHALMOLOGY | Facility: CLINIC | Age: 34
End: 2024-12-26
Payer: COMMERCIAL

## 2024-12-26 NOTE — TELEPHONE ENCOUNTER
I called and left message for Miracle to give us a day and time that works to have her come in and see Dr Navas.  I asked Miracle to call or mychart back.

## 2024-12-30 ENCOUNTER — OFFICE VISIT (OUTPATIENT)
Dept: OPHTHALMOLOGY | Facility: CLINIC | Age: 34
End: 2024-12-30
Attending: OPHTHALMOLOGY
Payer: COMMERCIAL

## 2024-12-30 DIAGNOSIS — Z15.89 HLA B27 (HLA B27 POSITIVE): ICD-10-CM

## 2024-12-30 DIAGNOSIS — H20.022 RECURRENT IRITIS OF LEFT EYE: Primary | ICD-10-CM

## 2024-12-30 PROCEDURE — G0463 HOSPITAL OUTPT CLINIC VISIT: HCPCS | Performed by: OPHTHALMOLOGY

## 2024-12-30 RX ORDER — METHYLPREDNISOLONE 4 MG/1
TABLET ORAL
Qty: 21 TABLET | Refills: 0 | Status: SHIPPED | OUTPATIENT
Start: 2024-12-30

## 2024-12-30 RX ORDER — PREDNISOLONE ACETATE 10 MG/ML
SUSPENSION/ DROPS OPHTHALMIC
Qty: 10 ML | Refills: 3 | Status: SHIPPED | OUTPATIENT
Start: 2024-12-30

## 2024-12-30 ASSESSMENT — VISUAL ACUITY
OS_CC: 20/20
OD_CC: 20/20
METHOD: SNELLEN - LINEAR
OS_CC+: -3
CORRECTION_TYPE: CONTACTS

## 2024-12-30 ASSESSMENT — CONF VISUAL FIELD
OS_INFERIOR_TEMPORAL_RESTRICTION: 0
OS_SUPERIOR_TEMPORAL_RESTRICTION: 0
OS_NORMAL: 1
OD_SUPERIOR_TEMPORAL_RESTRICTION: 0
OD_INFERIOR_NASAL_RESTRICTION: 0
OD_INFERIOR_TEMPORAL_RESTRICTION: 0
OD_SUPERIOR_NASAL_RESTRICTION: 0
OD_NORMAL: 1
METHOD: COUNTING FINGERS
OS_SUPERIOR_NASAL_RESTRICTION: 0
OS_INFERIOR_NASAL_RESTRICTION: 0

## 2024-12-30 ASSESSMENT — CUP TO DISC RATIO
OS_RATIO: 0.25
OD_RATIO: 0.25

## 2024-12-30 ASSESSMENT — TONOMETRY
OD_IOP_MMHG: 12
OS_IOP_MMHG: 12
IOP_METHOD: TONOPEN

## 2024-12-30 ASSESSMENT — EXTERNAL EXAM - RIGHT EYE: OD_EXAM: NORMAL

## 2024-12-30 ASSESSMENT — EXTERNAL EXAM - LEFT EYE: OS_EXAM: NORMAL

## 2024-12-30 NOTE — PATIENT INSTRUCTIONS
No drops needed right eye   For the steroid Left eye, let's try this plan: Use 1 drop 3x/day for one week, then 1 drop 2x/day for one week, then 1 drop daily for one week then stop  We sent a prescription for a 6 day Medrol dose pack of oral steroid to see if this can help hip inflammation (along with eye, although not specifically needed for eye)  You could consider some supplement such as Turmeric (as Circumin capsule) if you interested

## 2024-12-30 NOTE — NURSING NOTE
Chief Complaints and History of Present Illnesses   Patient presents with    Iritis Follow Up     Recurrent iritis     Chief Complaint(s) and History of Present Illness(es)       Iritis Follow Up              Laterality: left eye    Course: gradually improving    Associated symptoms: photophobia.  Negative for eye pain, redness and tearing    Treatments tried: eye drops    Pain scale: 0/10    Comments: Recurrent iritis              Comments    She states that her left eye flared on the 23rd.  She got a pressure feeling, redness and light sensitivity in her left eye.  She first started allergy eye drops on the 23rd and then on the 25th steroid eye drops.  She is currently using Prednisolone drops twice a day in her left eye.  All symptoms improved with use of steroid drops.      Ghazal Herndon, COT 7:57 AM  December 30, 2024

## 2024-12-30 NOTE — LETTER
12/30/2024       RE: Miracle Robertson  [address]  [facility] [address]     Dear Colleague,    Thank you for referring your patient, Miracle Robertson, to the [facility] [place] at [facility]. Please see a copy of my visit note below.    Chief Complaint/Presenting Concern:  Uveitis visit    Interval History of Present Ocular Illness:  Miracle Robertson is a 34 year old patient who returns for follow up of her recurrent iritis. At our last visit in March 2024 we tapered drops left eye.    Miracle reported some symptoms in the left eye and we messaged about restarting drops and coming for a check today. She does report things are better on drops 2-3x/day left eye. Right eye still fine    Interval Updates to Medical/Family/Social History:    Miracle followed up Dr. Downey in Rheumatology who did not find any specific indication for systemic treatment  Had nice trip to [place]  Left hip capsule issue being addressed with PT and has even tried injections. No surgery currently planned    Relevant Review of Systems Updates:  As above and fatigue which is most notable during menstrual periods but has been more intense recently with eye flare.    IMPRESSION: MRI Lumbar spine 12/12/24--report  1.  No spinal stenosis or foraminal narrowing at any lumbar level.  2.  Satisfactory lumbar vertebral body height. Very low-grade retrolisthesis L5-S1.  3.  No disc herniations at any lumbar level. No marrow or ligamentous edema.  4.  Lower cord conus medullaris and roots of the cauda equina appear satisfactory overall.    IMPRESSION from MRI Sacroiliac joint 7/12/24  No MRI findings to suggest sacroiliitis.     Labs: May 2024--reviewed: C4, C3, Anti Wang, anti ds DNA, RNP     Current eye related medications: Prednisolone 2x/day left eye    No Imaging     Assessment:     1. Recurrent iritis of left eye (Primary)  Symptoms with some cells today    2. HLA B27 (HLA B27  positive)  Without obvious systemic associations    Plan/Recommendations:    Discussed findings with patient. There is some inflammation in the left eye which has not resolved. We will modify drop frequency as below  Eye pressure is normal in each eye and the right eye remains uninvolved  Recommend additional testing:None at this time given likely association with HLA B27 positivity and negative systemic inflammatory evaluation with Dr. Downey  No drops needed right eye   For the steroid Left eye, let's try this plan: Use 1 drop 3x/day for one week, then 1 drop 2x/day for one week, then 1 drop daily for one week then stop  We sent a prescription for a 6 day Medrol dose pack of oral steroid to see if this can help hip inflammation (along with eye, although not specifically needed for eye)  You could consider some supplement such as Turmeric (as Circumin capsule) if you interested    RTC PRN    Physician Attestation    Attending Physician Attestation:  Complete documentation of historical and exam elements from today's encounter can be found in the full encounter summary report (not reduplicated in this progress note). I personally obtained the chief complaint(s) and history of present illness. I confirmed and edited as necessary the review of systems, past medical/surgical history, family history, social history, and examination findings as documented by others; and I examined the patient myself. I personally reviewed the relevant tests, images, and reports as documented above. I formulated and edited as necessary the assessment and plan and discussed the findings and management plan with the patient and family members present at the time of this visit.  Noel Navas M.D., Uveitis and Medical Retina, December 30, 2024     Again, thank you for allowing me to participate in the care of your patient.      Sincerely,    Noel Navas MD  Uveitis and Medical Retina    Department of Ophthalmology  & Visual Neurosciences  [facility]

## 2024-12-30 NOTE — PROGRESS NOTES
Chief Complaint/Presenting Concern:  Uveitis visit    Interval History of Present Ocular Illness:  Miracle Robertson is a 34 year old patient who returns for follow up of her recurrent iritis. At our last visit in March 2024 we tapered drops left eye.    Miracle reported some symptoms in the left eye and we messaged about restarting drops and coming for a check today. She does report things are better on drops 2-3x/day left eye. Right eye still fine    Interval Updates to Medical/Family/Social History:    Miracle followed up Dr. Downey in Rheumatology who did not find any specific indication for systemic treatment  Had nice trip to [place]  Left hip capsule issue being addressed with PT and has even tried injections. No surgery currently planned    Relevant Review of Systems Updates:  As above and fatigue which is most notable during menstrual periods but has been more intense recently with eye flare.    IMPRESSION: MRI Lumbar spine 12/12/24--report  1.  No spinal stenosis or foraminal narrowing at any lumbar level.  2.  Satisfactory lumbar vertebral body height. Very low-grade retrolisthesis L5-S1.  3.  No disc herniations at any lumbar level. No marrow or ligamentous edema.  4.  Lower cord conus medullaris and roots of the cauda equina appear satisfactory overall.    IMPRESSION from MRI Sacroiliac joint 7/12/24  No MRI findings to suggest sacroiliitis.     Labs: May 2024--reviewed: C4, C3, Anti Wang, anti ds DNA, RNP     Current eye related medications: Prednisolone 2x/day left eye    No Imaging     Assessment:     1. Recurrent iritis of left eye (Primary)  Symptoms with some cells today    2. HLA B27 (HLA B27 positive)  Without obvious systemic associations    Plan/Recommendations:    Discussed findings with patient. There is some inflammation in the left eye which has not resolved. We will modify drop frequency as below  Eye pressure is normal in each eye and the right eye remains uninvolved  Recommend additional  testing:None at this time given likely association with HLA B27 positivity and negative systemic inflammatory evaluation with Dr. Downey  No drops needed right eye   For the steroid Left eye, let's try this plan: Use 1 drop 3x/day for one week, then 1 drop 2x/day for one week, then 1 drop daily for one week then stop  We sent a prescription for a 6 day Medrol dose pack of oral steroid to see if this can help hip inflammation (along with eye, although not specifically needed for eye)  You could consider some supplement such as Turmeric (as Circumin capsule) if you interested    RTC PRN    Physician Attestation     Attending Physician Attestation:  Complete documentation of historical and exam elements from today's encounter can be found in the full encounter summary report (not reduplicated in this progress note). I personally obtained the chief complaint(s) and history of present illness. I confirmed and edited as necessary the review of systems, past medical/surgical history, family history, social history, and examination findings as documented by others; and I examined the patient myself. I personally reviewed the relevant tests, images, and reports as documented above. I formulated and edited as necessary the assessment and plan and discussed the findings and management plan with the patient and family members present at the time of this visit.  Noel Navas M.D., Uveitis and Medical Retina, December 30, 2024

## 2024-12-31 ENCOUNTER — THERAPY VISIT (OUTPATIENT)
Dept: PHYSICAL THERAPY | Facility: CLINIC | Age: 34
End: 2024-12-31
Payer: COMMERCIAL

## 2024-12-31 DIAGNOSIS — M25.559 HIP PAIN, UNSPECIFIED LATERALITY: ICD-10-CM

## 2024-12-31 DIAGNOSIS — S76.012D TEAR OF LEFT GLUTEUS MEDIUS TENDON, SUBSEQUENT ENCOUNTER: ICD-10-CM

## 2024-12-31 DIAGNOSIS — M25.852 FEMOROACETABULAR IMPINGEMENT OF LEFT HIP: Primary | ICD-10-CM

## 2024-12-31 PROCEDURE — 97140 MANUAL THERAPY 1/> REGIONS: CPT | Mod: GP | Performed by: PHYSICAL THERAPIST

## 2024-12-31 PROCEDURE — 97110 THERAPEUTIC EXERCISES: CPT | Mod: GP | Performed by: PHYSICAL THERAPIST

## 2025-01-07 ENCOUNTER — NURSE TRIAGE (OUTPATIENT)
Dept: INTERNAL MEDICINE | Facility: CLINIC | Age: 35
End: 2025-01-07
Payer: COMMERCIAL

## 2025-01-07 ENCOUNTER — MYC MEDICAL ADVICE (OUTPATIENT)
Dept: INTERNAL MEDICINE | Facility: CLINIC | Age: 35
End: 2025-01-07
Payer: COMMERCIAL

## 2025-01-07 PROBLEM — M25.559 HIP PAIN, UNSPECIFIED LATERALITY: Status: RESOLVED | Noted: 2024-10-23 | Resolved: 2025-01-07

## 2025-01-07 PROBLEM — S76.012A TEAR OF LEFT GLUTEUS MEDIUS TENDON: Status: RESOLVED | Noted: 2024-10-23 | Resolved: 2025-01-07

## 2025-01-07 PROBLEM — M25.852 FEMOROACETABULAR IMPINGEMENT OF LEFT HIP: Status: RESOLVED | Noted: 2024-10-23 | Resolved: 2025-01-07

## 2025-01-07 PROBLEM — F33.42 RECURRENT MAJOR DEPRESSIVE DISORDER, IN FULL REMISSION: Status: RESOLVED | Noted: 2024-04-02 | Resolved: 2025-01-07

## 2025-01-07 NOTE — TELEPHONE ENCOUNTER
"Pt refused Disposition - to be seen TODAY - and given limited OV times pt was directed to . Triaged connected with pt's PCP to discuss alternative, and was able to schedule the following with PCP's consent --    Jan 08, 2025 1:30 PM  (Arrive by 1:10 PM)  Provider Visit with Margo Mane MD  [facility] ([facility] ) [phone]     Florecita Lemon RN    ~~~~~~~~~~~~~~~~~~~~~    1. ONSET: \"When did the cough begin?\"       1/4   2. SEVERITY: \"How bad is the cough today?\"       abnormal   3. SPUTUM: \"Describe the color of your sputum\" (e.g., none, dry cough; clear, white, yellow, green)      Intermittently - clear colored   4. HEMOPTYSIS: \"Are you coughing up any blood?\" If Yes, ask: \"How much?\" (e.g., flecks, streaks, tablespoons, etc.)      Denies  5. DIFFICULTY BREATHING: \"Are you having difficulty breathing?\" If Yes, ask: \"How bad is it?\" (e.g., mild, moderate, severe)       SOB on exertion, abnormal   6. FEVER: \"Do you have a fever?\" If Yes, ask: \"What is your temperature, how was it measured, and when did it start?\"      Fever present over weekend (99.8F) and at nights  7. CARDIAC HISTORY: \"Do you have any history of heart disease?\" (e.g., heart attack, congestive heart failure)       Denies  8. LUNG HISTORY: \"Do you have any history of lung disease?\"  (e.g., pulmonary embolus, asthma, emphysema)      Denies  9. PE RISK FACTORS: \"Do you have a history of blood clots?\" (or: recent major surgery, recent prolonged travel, bedridden)      Denies - hx of autoimmune disorder  10. OTHER SYMPTOMS: \"Do you have any other symptoms?\" (e.g., runny nose, wheezing, chest pain)        Decreased appetite, poor sleep, fatigue, chest heaviness   11. PREGNANCY: \"Is there any chance you are pregnant?\" \"When was your last menstrual period?\"        Denies  12. TRAVEL: \"Have you traveled out of the country in the last month?\" (e.g., travel history, " exposures)        Denies  13. MEDICATIONS       Mucinex, Nyquil, increasing fluid    ~~~~~~~~~~~~~~~~~~~~~    Reason for Disposition   SEVERE coughing spells (e.g., whooping sound after coughing, vomiting after coughing)   Fever present > 3 days (72 hours)    Additional Information   Negative: Bluish (or gray) lips or face   Negative: SEVERE difficulty breathing (e.g., struggling for each breath, speaks in single words)   Negative: Rapid onset of cough and has hives   Negative: Coughing started suddenly after medicine, an allergic food or bee sting   Negative: Difficulty breathing after exposure to flames, smoke, or fumes   Negative: Sounds like a life-threatening emergency to the triager   Negative: Previous asthma attacks and this feels like asthma attack   Negative: Dry cough (non-productive; no sputum or minimal clear sputum) and within 14 days of COVID-19 Exposure   Negative: MODERATE difficulty breathing (e.g., speaks in phrases, SOB even at rest, pulse 100-120) and still present when not coughing   Negative: Chest pain present when not coughing   Negative: Passed out (e.g., fainted, lost consciousness, blacked out and was not responding)   Negative: Patient sounds very sick or weak to the triager   Negative: MILD difficulty breathing (e.g., minimal/no SOB at rest, SOB with walking, pulse <100) and still present when not coughing   Negative: Coughed up > 1 tablespoon (15 ml) blood (Exception: Blood-tinged sputum.)   Negative: Fever > 103 F (39.4 C)   Negative: Fever > 101 F (38.3 C) and over 60 years of age   Negative: Fever > 100 F (37.8 C) and has diabetes mellitus or a weak immune system (e.g., HIV positive, cancer chemotherapy, organ transplant, splenectomy, chronic steroids)   Negative: Fever > 100 F (37.8 C) and bedridden (e.g., CVA, chronic illness, recovering from surgery)   Negative: Increasing ankle swelling   Negative: Wheezing is present    Protocols used: Cough-A-OH

## 2025-01-08 ENCOUNTER — OFFICE VISIT (OUTPATIENT)
Dept: INTERNAL MEDICINE | Facility: CLINIC | Age: 35
End: 2025-01-08
Payer: COMMERCIAL

## 2025-01-08 VITALS
TEMPERATURE: 97.5 F | DIASTOLIC BLOOD PRESSURE: 70 MMHG | SYSTOLIC BLOOD PRESSURE: 118 MMHG | BODY MASS INDEX: 26.96 KG/M2 | OXYGEN SATURATION: 100 % | HEART RATE: 76 BPM | WEIGHT: 162 LBS | RESPIRATION RATE: 19 BRPM

## 2025-01-08 DIAGNOSIS — J06.9 UPPER RESPIRATORY TRACT INFECTION, UNSPECIFIED TYPE: Primary | ICD-10-CM

## 2025-01-08 LAB
FLUAV RNA SPEC QL NAA+PROBE: POSITIVE
FLUBV RNA RESP QL NAA+PROBE: NEGATIVE
RSV RNA SPEC NAA+PROBE: NEGATIVE
SARS-COV-2 RNA RESP QL NAA+PROBE: NEGATIVE

## 2025-01-08 PROCEDURE — 87637 SARSCOV2&INF A&B&RSV AMP PRB: CPT | Performed by: INTERNAL MEDICINE

## 2025-01-08 PROCEDURE — 99213 OFFICE O/P EST LOW 20 MIN: CPT | Performed by: INTERNAL MEDICINE

## 2025-01-08 RX ORDER — CODEINE PHOSPHATE AND GUAIFENESIN 10; 100 MG/5ML; MG/5ML
1-2 SOLUTION ORAL EVERY 4 HOURS PRN
Qty: 118 ML | Refills: 0 | Status: SHIPPED | OUTPATIENT
Start: 2025-01-08

## 2025-01-08 NOTE — PROGRESS NOTES
ASSESSMENT/PLAN                                                      (J06.9) Upper respiratory tract infection, unspecified type  (primary encounter diagnosis)  Plan: testing for influenza, COVID-19, and RSV today; supportive care measures recommended; Robitussin AC prescribed; if symptoms worsen, change, or do not improve, patient to contact MD. Margo Mane MD   [place] [address] [address] [place] [id number]  T: [phone], F: [phone]    SUBJECTIVE                                                      Miracle Robertson is a very pleasant 34 year old female who presents with cold symptoms:    Symptoms started on Saturday with a cough and chest heaviness/burning sensation.  She developed low-grade fevers and chills on Sunday. More recently she has developed ear plugging, sinus congestion, and headaches.  Associated rhinitis and lethargy. No sore throat.    Potential sick contacts last week (clients with recent URIs).      Has been using OTC Mucinex with modest relief of symptoms.    OBJECTIVE                                                      /70 (BP Location: Left arm, Patient Position: Sitting, Cuff Size: Adult Large)   Pulse 76   Temp 97.5  F (36.4  C) (Temporal)   Resp 19   Wt 73.5 kg (162 lb)   LMP 12/16/2024 (Approximate)   SpO2 100%   BMI 26.96 kg/m    Constitutional: tired-appearing  Head, Ears, Nose, and Mouth: normocephalic, atraumatic; normal external auditory canal and pinna; tympanic membranes visualized and normal; nasal septum straight; no oropharyngeal lesions or ulcers; mild posterior oropharynx erythema  Neck: supple, symmetric, no thyromegaly or lymphadenopathy  Respiratory: normal respiratory effort; clear to auscultation bilaterally  Musculoskeletal: normal gait and station  Psych: normal judgment and insight; normal mood and affect; recent and remote memory intact    ---    (Note documentation was completed, in part, with [employer] voice-recognition  software. Documentation was reviewed, but some grammatical, spelling, and word errors may remain.)

## 2025-01-15 ENCOUNTER — THERAPY VISIT (OUTPATIENT)
Dept: PHYSICAL THERAPY | Facility: CLINIC | Age: 35
End: 2025-01-15
Payer: COMMERCIAL

## 2025-01-15 DIAGNOSIS — S76.012D TEAR OF LEFT GLUTEUS MEDIUS TENDON, SUBSEQUENT ENCOUNTER: ICD-10-CM

## 2025-01-15 DIAGNOSIS — M25.852 FEMOROACETABULAR IMPINGEMENT OF LEFT HIP: Primary | ICD-10-CM

## 2025-01-15 DIAGNOSIS — R39.9 LOWER URINARY TRACT SYMPTOMS (LUTS): Primary | ICD-10-CM

## 2025-01-15 DIAGNOSIS — M25.559 HIP PAIN, UNSPECIFIED LATERALITY: ICD-10-CM

## 2025-02-12 ENCOUNTER — THERAPY VISIT (OUTPATIENT)
Dept: PHYSICAL THERAPY | Facility: CLINIC | Age: 35
End: 2025-02-12
Payer: COMMERCIAL

## 2025-02-12 DIAGNOSIS — S76.012D TEAR OF LEFT GLUTEUS MEDIUS TENDON, SUBSEQUENT ENCOUNTER: ICD-10-CM

## 2025-02-12 DIAGNOSIS — M25.852 FEMOROACETABULAR IMPINGEMENT OF LEFT HIP: Primary | ICD-10-CM

## 2025-02-12 DIAGNOSIS — M25.559 HIP PAIN, UNSPECIFIED LATERALITY: ICD-10-CM

## 2025-02-12 PROCEDURE — 97110 THERAPEUTIC EXERCISES: CPT | Mod: GP | Performed by: PHYSICAL THERAPIST

## 2025-02-17 DIAGNOSIS — F33.42 RECURRENT MAJOR DEPRESSIVE DISORDER, IN FULL REMISSION: ICD-10-CM

## 2025-02-17 DIAGNOSIS — F41.1 GAD (GENERALIZED ANXIETY DISORDER): ICD-10-CM

## 2025-02-17 RX ORDER — VENLAFAXINE HYDROCHLORIDE 75 MG/1
CAPSULE, EXTENDED RELEASE ORAL
Qty: 90 CAPSULE | Refills: 3 | OUTPATIENT
Start: 2025-02-17

## 2025-02-18 ENCOUNTER — NURSE TRIAGE (OUTPATIENT)
Dept: INTERNAL MEDICINE | Facility: CLINIC | Age: 35
End: 2025-02-18
Payer: COMMERCIAL

## 2025-02-18 DIAGNOSIS — R11.0 NAUSEA: ICD-10-CM

## 2025-02-18 DIAGNOSIS — S09.90XA TRAUMATIC INJURY OF HEAD, INITIAL ENCOUNTER: Primary | ICD-10-CM

## 2025-02-24 NOTE — TELEPHONE ENCOUNTER
Patient Contact    Attempt # 2 via phone    Was call answered?  No.  Left message on voicemail with information to call clinic back.    Postponing encounter to tomorrow 2/25 for followup.    Florecita Lemon RN

## 2025-02-24 NOTE — TELEPHONE ENCOUNTER
Patient Contact    Attempt # 1 via MC    Leaving incident open for continued attempts to contact pt    Florecita Lemon RN

## 2025-02-24 NOTE — TELEPHONE ENCOUNTER
Patient Contact    Attempt # 1    Was call answered?  No.  Left message on voicemail with information to call clinic back.    Upon callback, please TRIAGE patient for reported s/s in MC.    Florecita Lemon RN

## 2025-02-25 NOTE — TELEPHONE ENCOUNTER
Patient Contact    Attempt # 3    Was call answered?  No.  Left message on voicemail with information to call back.     (2 attempts made on same day yesterday so should have one more outreach attempt tomorrow 2-26)    Odalys Nassar RN

## 2025-02-26 NOTE — TELEPHONE ENCOUNTER
Reason for Disposition    Sounds like a serious injury to the triager    Additional Information    Negative: ACUTE NEURO SYMPTOM and symptom present now (Definition: Difficult to awaken OR confused thinking and talking OR slurred speech OR weakness of arms or legs OR unsteady walking.)    Negative: Knocked out (unconscious) > 1 minute    Negative: Seizure (convulsion) occurred  (Exception: Prior history of seizures and now alert and without Acute Neuro Symptoms.)    Negative: Neck pain after dangerous injury (e.g., MVA, diving, trampoline, contact sports, fall > 10 feet or 3 meters)  (Exception: Neck pain began > 1 hour after injury.)    Negative: Major bleeding (actively dripping or spurting) that can't be stopped    Negative: Penetrating head injury (e.g., knife, gunshot wound, metal object)    Negative: Sounds like a life-threatening emergency to the triager    Negative: Diagnosed with a concussion within last 14 days    Negative: Can't remember what happened (amnesia)    Negative: Vomiting once or more    Negative: Loss of vision or double vision is present now    Negative: Watery or blood-tinged fluid dripping from the nose or ears    Negative: ACUTE NEURO SYMPTOM and now fine  (Definition: Difficult to awaken OR confused thinking and talking OR slurred speech OR weakness of arms or legs OR unsteady walking.)    Negative: Knocked out (unconscious) < 1 minute and now fine    Negative: SEVERE headache    Negative: Dangerous injury (e.g., MVA, diving, trampoline, contact sports, fall > 10 feet or 3 meters) or severe blow from hard object (e.g., golf club or baseball bat)    Negative: Large swelling or bruise (> 2 inches or 5 cm)    Negative: Skin is split open or gaping (length > 1/2 inch or 12 mm)    Negative: Bleeding won't stop after 10 minutes of direct pressure (using correct technique)    Negative: Taking Coumadin (warfarin) or other strong blood thinner, or known bleeding disorder (e.g.,  thrombocytopenia)    Negative: Age over 64 years with an area of head swelling or bruise    Protocols used: Head Injury-A-OH

## 2025-02-26 NOTE — TELEPHONE ENCOUNTER
"Nurse Triage SBAR    Is this a 2nd Level Triage? YES, LICENSED PRACTITIONER REVIEW IS REQUIRED    Situation: Pt sent in a [employer] message about hitting her head while snowboarding.     Background: Pt had chiropractic and message work done on her neck and shoulders that have helped with the neck and shoulder pain.     Not on blood thinners.   Assessment: Pt was snowboarding on 2/16/25 and fell and hit her head about 3 times. Pt stated the last time she hit her head, she felt she got whip lash. Pt was wearing a helmet. Pt did have neck pain right after but it has since gotten better after going to a chiropractor and getting a message. Pt does get HA when looking at screens (pt has to look at screens for work). Pt had nausea last week. Current neck/shoulder pain is 5/10. No open areas of skin, bruising or swelling on head.     Pt is currently alert to person, place, situation.     Pt denies losing consciousness after falls.        Protocol Recommended Disposition:   Call ADS/Go to ED/UCC Now (Or To Office with PCP Approval)    Recommendation: Should pt be seen for this? Pt stated she wants to get back out and snowboard but wants to make sure she is ok first.     Routing to PCP to review and advise.     Please call pt back with PCPs recommendations.     Routed to provider    Does the patient meet one of the following criteria for ADS visit consideration? 16+ years old, with an [facility] PCP     TIP  Providers, please consider if this condition is appropriate for management at one of our Acute and Diagnostic Services sites.     If patient is a good candidate, please use dotphrase <dot>triageresponse and select Refer to ADS to document.       1. MECHANISM: \"How did the injury happen?\" For falls, ask: \"What height did you fall from?\" and \"What surface did you fall against?\"       Snowboarding   2. ONSET: \"When did the injury happen?\" (e.g., minutes, hours ago)       2/16/25  3. NEUROLOGIC SYMPTOMS: \"Was there any loss of " "consciousness?\" \"Are there any other neurological symptoms?\"       HA when she looks at screens (has to do this for job)  4. MENTAL STATUS: \"Does the person know who they are, who you are, and where they are?\"       Pt alert to person, location and who they are talking too   5. LOCATION: \"What part of the head was hit?\"       Back of head   6. SCALP APPEARANCE: \"What does the scalp look like? Is it bleeding now?\" If Yes, ask: \"Is it difficult to stop?\"       No active bleeding   7. SIZE: For cuts, bruises, or swelling, ask: \"How large is it?\" (e.g., inches or centimeters)       No  8. PAIN: \"Is there any pain?\" If Yes, ask: \"How bad is it?\" (Scale 0-10; or none, mild, moderate, severe)      Neck & shoulders 5/10  9. TETANUS: For any breaks in the skin, ask: \"When was the last tetanus booster?\"      No  10. BLOOD THINNERS: \"Do you take any blood thinners?\" (e.g., aspirin, clopidogrel / Plavix, coumadin, heparin). Notes: Other strong blood thinners include: Arixtra (fondaparinux), Eliquis (apixaban), Pradaxa (dabigatran), and Xarelto (rivaroxaban).        No  11. OTHER SYMPTOMS: \"Do you have any other symptoms?\" (e.g., neck pain, vomiting)        Nausea last week, neck pain (has gotten chrio and massage work done that has helped)   12. PREGNANCY: \"Is there any chance you are pregnant?\" \"When was your last menstrual period?\"        No  Reason for Disposition   Dangerous injury (e.g., MVA, diving, trampoline, contact sports, fall > 10 feet or 3 meters) or severe blow from hard object (e.g., golf club or baseball bat)    Additional Information   Negative: ACUTE NEURO SYMPTOM and symptom present now (Definition: Difficult to awaken OR confused thinking and talking OR slurred speech OR weakness of arms or legs OR unsteady walking.)   Negative: Knocked out (unconscious) > 1 minute   Negative: Seizure (convulsion) occurred  (Exception: Prior history of seizures and now alert and without Acute Neuro Symptoms.)   Negative: " Neck pain after dangerous injury (e.g., MVA, diving, trampoline, contact sports, fall > 10 feet or 3 meters)  (Exception: Neck pain began > 1 hour after injury.)   Negative: Major bleeding (actively dripping or spurting) that can't be stopped   Negative: Penetrating head injury (e.g., knife, gunshot wound, metal object)   Negative: Sounds like a life-threatening emergency to the triager   Negative: Diagnosed with a concussion within last 14 days   Negative: Can't remember what happened (amnesia)   Negative: Vomiting once or more   Negative: Loss of vision or double vision is present now   Negative: Watery or blood-tinged fluid dripping from the nose or ears   Negative: ACUTE NEURO SYMPTOM and now fine  (Definition: Difficult to awaken OR confused thinking and talking OR slurred speech OR weakness of arms or legs OR unsteady walking.)   Negative: Knocked out (unconscious) < 1 minute and now fine   Negative: SEVERE headache    Protocols used: Head Injury-A-[place]

## 2025-02-26 NOTE — TELEPHONE ENCOUNTER
Attempt #4: Left Message - on call back, triage     Writer left message for patient requesting that they return call to discuss message below:

## 2025-03-03 ENCOUNTER — ANCILLARY PROCEDURE (OUTPATIENT)
Dept: CT IMAGING | Facility: CLINIC | Age: 35
End: 2025-03-03
Attending: INTERNAL MEDICINE
Payer: COMMERCIAL

## 2025-03-03 DIAGNOSIS — S09.90XA TRAUMATIC INJURY OF HEAD, INITIAL ENCOUNTER: ICD-10-CM

## 2025-03-03 DIAGNOSIS — R11.0 NAUSEA: ICD-10-CM

## 2025-03-03 PROCEDURE — 70450 CT HEAD/BRAIN W/O DYE: CPT

## 2025-04-20 PROBLEM — M25.559 HIP PAIN, UNSPECIFIED LATERALITY: Status: RESOLVED | Noted: 2025-02-12 | Resolved: 2025-04-20

## 2025-04-20 PROBLEM — M25.852 FEMOROACETABULAR IMPINGEMENT OF LEFT HIP: Status: RESOLVED | Noted: 2025-02-12 | Resolved: 2025-04-20

## 2025-04-20 PROBLEM — S76.012D TEAR OF LEFT GLUTEUS MEDIUS TENDON, SUBSEQUENT ENCOUNTER: Status: RESOLVED | Noted: 2025-02-12 | Resolved: 2025-04-20

## 2025-04-21 ENCOUNTER — OFFICE VISIT (OUTPATIENT)
Dept: INTERNAL MEDICINE | Facility: CLINIC | Age: 35
End: 2025-04-21
Payer: COMMERCIAL

## 2025-04-21 VITALS
HEIGHT: 65 IN | HEART RATE: 86 BPM | TEMPERATURE: 98.1 F | DIASTOLIC BLOOD PRESSURE: 71 MMHG | OXYGEN SATURATION: 99 % | WEIGHT: 159.6 LBS | BODY MASS INDEX: 26.59 KG/M2 | SYSTOLIC BLOOD PRESSURE: 104 MMHG | RESPIRATION RATE: 16 BRPM

## 2025-04-21 DIAGNOSIS — F33.42 RECURRENT MAJOR DEPRESSIVE DISORDER, IN FULL REMISSION: ICD-10-CM

## 2025-04-21 DIAGNOSIS — H20.022 RECURRENT IRITIS, LEFT: ICD-10-CM

## 2025-04-21 DIAGNOSIS — F41.1 GAD (GENERALIZED ANXIETY DISORDER): ICD-10-CM

## 2025-04-21 DIAGNOSIS — Z15.89 HLA B27 (HLA B27 POSITIVE): ICD-10-CM

## 2025-04-21 DIAGNOSIS — J30.2 SEASONAL ALLERGIES: ICD-10-CM

## 2025-04-21 DIAGNOSIS — G43.009 MIGRAINE WITHOUT AURA AND WITHOUT STATUS MIGRAINOSUS, NOT INTRACTABLE: ICD-10-CM

## 2025-04-21 DIAGNOSIS — Z30.41 ENCOUNTER FOR SURVEILLANCE OF CONTRACEPTIVE PILLS: ICD-10-CM

## 2025-04-21 DIAGNOSIS — Z00.00 ROUTINE HISTORY AND PHYSICAL EXAMINATION OF ADULT: Primary | ICD-10-CM

## 2025-04-21 PROCEDURE — 99214 OFFICE O/P EST MOD 30 MIN: CPT | Mod: 25 | Performed by: INTERNAL MEDICINE

## 2025-04-21 PROCEDURE — 99395 PREV VISIT EST AGE 18-39: CPT | Performed by: INTERNAL MEDICINE

## 2025-04-21 PROCEDURE — 96127 BRIEF EMOTIONAL/BEHAV ASSMT: CPT | Performed by: INTERNAL MEDICINE

## 2025-04-21 PROCEDURE — 3074F SYST BP LT 130 MM HG: CPT | Performed by: INTERNAL MEDICINE

## 2025-04-21 PROCEDURE — 3078F DIAST BP <80 MM HG: CPT | Performed by: INTERNAL MEDICINE

## 2025-04-21 RX ORDER — VENLAFAXINE HYDROCHLORIDE 75 MG/1
75 CAPSULE, EXTENDED RELEASE ORAL EVERY MORNING
Qty: 90 CAPSULE | Refills: 3 | Status: SHIPPED | OUTPATIENT
Start: 2025-04-21

## 2025-04-21 RX ORDER — FLUTICASONE PROPIONATE 50 MCG
2 SPRAY, SUSPENSION (ML) NASAL DAILY
Qty: 16 G | Refills: 3 | Status: SHIPPED | OUTPATIENT
Start: 2025-04-21

## 2025-04-21 RX ORDER — DESOGESTREL AND ETHINYL ESTRADIOL 0.15-0.03
1 KIT ORAL DAILY
Qty: 84 TABLET | Refills: 3 | Status: SHIPPED | OUTPATIENT
Start: 2025-04-21

## 2025-04-21 RX ORDER — PREDNISOLONE ACETATE 10 MG/ML
SUSPENSION/ DROPS OPHTHALMIC
Qty: 10 ML | Refills: 3 | Status: SHIPPED | OUTPATIENT
Start: 2025-04-21

## 2025-04-21 RX ORDER — SUMATRIPTAN 5 MG/1
1 SPRAY NASAL PRN
Qty: 1 EACH | Refills: 3 | Status: SHIPPED | OUTPATIENT
Start: 2025-04-21

## 2025-04-21 SDOH — HEALTH STABILITY: PHYSICAL HEALTH: ON AVERAGE, HOW MANY MINUTES DO YOU ENGAGE IN EXERCISE AT THIS LEVEL?: 40 MIN

## 2025-04-21 SDOH — HEALTH STABILITY: PHYSICAL HEALTH: ON AVERAGE, HOW MANY DAYS PER WEEK DO YOU ENGAGE IN MODERATE TO STRENUOUS EXERCISE (LIKE A BRISK WALK)?: 5 DAYS

## 2025-04-21 ASSESSMENT — PATIENT HEALTH QUESTIONNAIRE - PHQ9
SUM OF ALL RESPONSES TO PHQ QUESTIONS 1-9: 6
SUM OF ALL RESPONSES TO PHQ QUESTIONS 1-9: 6
10. IF YOU CHECKED OFF ANY PROBLEMS, HOW DIFFICULT HAVE THESE PROBLEMS MADE IT FOR YOU TO DO YOUR WORK, TAKE CARE OF THINGS AT HOME, OR GET ALONG WITH OTHER PEOPLE: SOMEWHAT DIFFICULT

## 2025-04-21 ASSESSMENT — SOCIAL DETERMINANTS OF HEALTH (SDOH): HOW OFTEN DO YOU GET TOGETHER WITH FRIENDS OR RELATIVES?: TWICE A WEEK

## 2025-04-21 NOTE — PROGRESS NOTES
ASSESSMENT/PLAN                                                       (Z00.00) Routine history and physical examination of adult  (primary encounter diagnosis)  Comment: PMH, PSH, FH, SH, medications, allergies, immunizations, and preventative health measures reviewed and updated as appropriate.  Plan: see below for plans.      (F41.1) ODETTE (generalized anxiety disorder)  (F33.42) Recurrent major depressive disorder, in full remission  Comment: well-controlled on Effexor.    Plan: continue present management; refills provided.     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  Plan: continue present management; refills provided.     (Z15.89) HLA B27 (HLA B27 positive)  Comment: without obvious systemic associations.    (Z30.41) Encounter for surveillance of contraceptive pills  Plan: refills of OCP provided.    (J30.2) Seasonal allergies  Comment: well-controlled on Flonase and Claritin.  Plan: continue present management; refills provided.     (G43.009) Migraine without aura and without status migrainosus, not intractable  Plan: TRIAL of Imitrex nasal spray as needed; if symptoms worsen, change, or do not improve, patient to contact MD.      Margo Mane MD   [address] [address]  T: [phone], F: [phone]    SUBJECTIVE                                                      Miracle Robertson is a very pleasant 34 year old female who presents for a physical.    ROS:  Constitutional: no unintentional weight loss or gain reported; no fevers, chills, or sweats  reported    Cardiovascular: no chest pain, palpitations, or edema reported  Respiratory: no cough, wheezing, shortness of breath, or dyspnea on exertion reported  Gastrointestinal: no nausea, vomiting, constipation, diarrhea, or abdominal pain reported  Genitourinary: no urinary frequency, urgency, dysuria, or hematuria  reported  Integumentary: no rash or pruritus reported  Musculoskeletal: no back pain, muscle pain, joint pain, or joint swelling reported  Neurologic: occasional migraines   hematologic: no easy bruising or bleeding reported  Endocrine: no heat or cold intolerance reported; no polyuria or polydipsia reported  Psychiatric: see PMH below    Past Medical History:   Diagnosis Date    ODETTE (generalized anxiety disorder)     HLA B27 (HLA B27 positive)     MDD (major depressive disorder)     Recurrent iritis, left      Past Surgical History:   Procedure Laterality Date    NO HISTORY OF SURGERY       Family History   Problem Relation Age of Onset    Deep Vein Thrombosis (DVT) Mother     Breast Cancer Maternal Grandmother     Diabetes Type 2  Paternal Grandfather     Prostate Cancer Paternal Grandfather     Deep Vein Thrombosis (DVT) Brother     Cerebrovascular Disease No family hx of     Myocardial Infarction No family hx of     Coronary Artery Disease Early Onset No family hx of     Colon Cancer No family hx of     Ovarian Cancer No family hx of     Glaucoma No family hx of      Social History     Occupational History    Occupation: [employer]   Tobacco Use    Smoking status: Never    Smokeless tobacco: Never   Vaping Use    Vaping status: Never Used   Substance and Sexual Activity    Alcohol use: Yes     Comment: 0-1 drink/week    Drug use: No    Sexual activity: Yes     Partners: Male     Birth control/protection: Pill   Social History Narrative    In a relationship.    No children.    Regular exercise through job.      No Known Allergies    Current Outpatient Medications   Medication Sig    Biotin w/ Vitamins C & E (HAIR SKIN & NAILS GUMMIES PO) Take by mouth.    desogestrel-ethinyl estradiol (ISIBLOOM) 0.15-30 MG-MCG tablet TAKE 1 TABLET BY MOUTH DAILY    fluticasone (FLONASE) 50 MCG/ACT nasal spray Spray 2 sprays into both nostrils daily. SHAKE LIQUID AND USE    MAGNESIUM PO Take by mouth.     prednisoLONE acetate (PRED FORTE) 1 % ophthalmic suspension Left eye: 1 drop 3x/day for 3 days. Call/message if not better.    venlafaxine (EFFEXOR XR) 75 MG 24 hr capsule Take 1 capsule (75 mg) by mouth every morning     Immunization History   Administered Date(s) Administered    COVID-19 Bivalent 12+ (Pfizer) 11/14/2022    COVID-19 MONOVALENT 12+ (Pfizer) 11/03/2021    COVID-19 Vaccine (Norma) 04/07/2021    DTAP (<7y) 11/19/1993, 04/15/1997    Flu, Unspecified 01/30/2017    HPV Quadrivalent 09/07/2007, 11/08/2007, 03/17/2008    Hepatitis B, Peds (Engerix-B/Recombivax HB) 07/17/1997, 08/20/1997, 03/30/1998    Historic Hib Hib-titer 1992, 1992, 12/17/1993    Historical DTP/aP 1992, 1992, 1992    Hpv, Unspecified  09/07/2007, 11/08/2007, 03/17/2008    Influenza (H1N1) 01/19/2010    Influenza (IIV3) PF 10/23/2015    Influenza (prior to 2024) 01/30/2017    Influenza Vaccine >6 months,quad, PF 02/20/2018, 01/20/2020, 11/18/2020, 11/03/2021, 11/14/2022    MMR (MMRII) 08/06/1993, 04/15/1997    Meningococcal ACWY (Menactra ) 01/19/2010    Meningococcal ACWY (Menveo ) 01/19/2010    Meningococcal,unspecified 01/19/2010    OPV, trivalent, live 04/15/1997    OPV, unspecified 1992, 1992, 11/19/1993    Poliovirus, inactivated (IPV) 04/15/1997    TDAP Vaccine (Adacel) 06/15/2007, 03/21/2019    Tdap (Adult) Unspecified Formulation 02/20/2018    Varicella (Varivax) 06/16/1994     PREVENTATIVE HEALTH                                                      BMI: overweight  Blood pressure: within normal limits   Breast CA screening: not medically indicated at this time   Cervical CA screening: up to date   Colon CA screening: not medically indicated at this time   Lung CA screening: n/a   Dexa: not medically indicated at this time   Screening cholesterol: not medically indicated at this time   Screening diabetes: not medically indicated at this time   STD testing: no risk factors  "present  Alcohol misuse screening: alcohol use reviewed - no intervention indicated at this time  Immunizations: reviewed;  COVID-19 booster DUE - previously declined (not discussed at today's visit)    OBJECTIVE                                                      /71   Pulse 86   Temp 98.1  F (36.7  C) (Temporal)   Resp 16   Ht 1.651 m (5' 5\")   Wt 72.4 kg (159 lb 9.6 oz)   SpO2 99%   BMI 26.56 kg/m    Constitutional: well-appearing  Head, Ears, and Eyes: normocephalic; normal external auditory canal and pinna; tympanic membranes visualized and normal; normal lids and conjunctivae  Neck: supple, symmetric, no thyromegaly or lymphadenopathy  Respiratory: normal respiratory effort; clear to auscultation bilaterally  Cardiovascular: regular rate and rhythm; no edema  Gastrointestinal: soft, non-tender, and non-distended; no organomegaly or masses  Musculoskeletal: normal gait and station  Psych: normal judgment and insight; normal mood and affect; recent and remote memory intact    ---  (Note was completed, in part, with [employer] voice-recognition software. Documentation was reviewed, but some grammatical, spelling, and word errors may remain.)    "

## 2025-04-28 ENCOUNTER — LAB (OUTPATIENT)
Dept: LAB | Facility: CLINIC | Age: 35
End: 2025-04-28
Payer: COMMERCIAL

## 2025-04-28 DIAGNOSIS — J02.9 SORE THROAT: ICD-10-CM

## 2025-04-28 LAB
DEPRECATED S PYO AG THROAT QL EIA: NEGATIVE
S PYO DNA THROAT QL NAA+PROBE: NOT DETECTED

## 2025-04-28 PROCEDURE — 87651 STREP A DNA AMP PROBE: CPT

## 2025-06-18 ENCOUNTER — OFFICE VISIT (OUTPATIENT)
Dept: ORTHOPEDICS | Facility: CLINIC | Age: 35
End: 2025-06-18
Payer: COMMERCIAL

## 2025-06-18 DIAGNOSIS — G89.29 CHRONIC HIP PAIN, LEFT: Primary | ICD-10-CM

## 2025-06-18 DIAGNOSIS — M25.531 CHRONIC PAIN OF RIGHT WRIST: ICD-10-CM

## 2025-06-18 DIAGNOSIS — M25.552 CHRONIC HIP PAIN, LEFT: Primary | ICD-10-CM

## 2025-06-18 DIAGNOSIS — G89.29 CHRONIC PAIN OF RIGHT WRIST: ICD-10-CM

## 2025-06-18 PROCEDURE — 99213 OFFICE O/P EST LOW 20 MIN: CPT | Performed by: FAMILY MEDICINE

## 2025-06-18 NOTE — PATIENT INSTRUCTIONS
[facility] / [place]  MD Dr. Josiane Turner MD    [facility]   Dr. Cuong Griffith MD    PRP  800/session

## 2025-06-18 NOTE — PROGRESS NOTES
CHIEF COMPLAINT:  No chief complaint on file.       HISTORY OF PRESENT ILLNESS  Ms. Robertson is a pleasant 35 year old year old female who presents to clinic today with right wrist and left hip pain.  Miracle explains that she has right wrist pain since November. She is unable to put pressure/weightbearing on her wrist and references numbness/discomfort when her wrist is placed in extension. The left hip started while she was snowboarding, running and squatting which she attributes her pain to overuse.     RIGHT WRIST  Onset: gradual  Location: right wrist  Quality:  stabbing, sharp, and numb  Duration: 8 months   Severity: 6/10 at worst  Timing:intermittent episodes with pressure/weightbearing  Modifying factors:  resting and non-use makes it better, movement and use makes it worse  Associated signs & symptoms: pain and tenderness  Previous similar pain: No  Treatments to date: Comfort Cool bracing, activity modifications, NSAIDs.    LEFT HIP  Onset: gradual  Location: left hip, anteriorly.  Quality:  aching and dull  Duration: 2 years, 3 months  Severity: 8/10 at worst  Timing: intermittent episodes with sitting for long periods, lateral lunges  Modifying factors:  resting and non-use makes it better, movement and use makes it worse  Associated signs & symptoms: pain and tenderness  Previous similar pain: No  Treatments to date: MRI, Physical Therapy extensively since fall 2024 11 visits and now enrolled in virtual PT, Shockwave, Cortisone Hip IA - 11/2024 ( No relief ),     Additional history: as documented    Review of Systems:   A 10-point review of systems was obtained and is negative except for as noted in the HPI.     MEDICAL HISTORY  Patient Active Problem List   Diagnosis    ODETTE (generalized anxiety disorder)    MDD (major depressive disorder)    Recurrent iritis, left    HLA B27 (HLA B27 positive)    Migraine without aura       Current Outpatient Medications   Medication Sig Dispense Refill     desogestrel-ethinyl estradiol (ISIBLOOM) 0.15-30 MG-MCG tablet Take 1 tablet by mouth daily. 84 tablet 3    fluticasone (FLONASE) 50 MCG/ACT nasal spray Spray 2 sprays into both nostrils daily. SHAKE LIQUID AND USE 16 g 3    MAGNESIUM PO Take by mouth.      prednisoLONE acetate (PRED FORTE) 1 % ophthalmic suspension Left eye: 1 drop 3x/day for 3 days. Call/message if not better. 10 mL 3    SUMAtriptan (IMITREX) 5 MG/ACT nasal spray Spray 1 spray in nostril as needed for migraine. May repeat in 30-60 minutes if continued symptoms. Max 8 sprays/24 hours. 1 each 3    venlafaxine (EFFEXOR XR) 75 MG 24 hr capsule Take 1 capsule (75 mg) by mouth every morning. 90 capsule 3       No Known Allergies    Family History   Problem Relation Age of Onset    Deep Vein Thrombosis (DVT) Mother     Breast Cancer Maternal Grandmother     Diabetes Type 2  Paternal Grandfather     Prostate Cancer Paternal Grandfather     Deep Vein Thrombosis (DVT) Brother     Cerebrovascular Disease No family hx of     Myocardial Infarction No family hx of     Coronary Artery Disease Early Onset No family hx of     Colon Cancer No family hx of     Ovarian Cancer No family hx of     Glaucoma No family hx of        Additional medical/Social/Surgical histories reviewed in [facility] and updated as appropriate.       PHYSICAL EXAM  There were no vitals taken for this visit.    General- AOX4, NAD  Musculoskeletal -  Right wrist  Inspection: normal joint alignment, no obvious deformity, no swelling  Palpation: No bony or soft tissue tenderness over dorsal wrist compartments.  Tenderness at radiocarpal region. Tenderness adjacent FCR. No tenderness at ECU or ECR.  No tenderness at the anatomical snuffbox, radial or ulnar styloid.   ROM:  Approximately 90  of wrist flexion, 70  wrist extension, 30 ulnar deviation and 15  radial deviation without pain.  Normal supination and pronation.   Strength: 5/5  strength, 5/5 flexion, extension, pronation, supination,  adduction, abduction  Special tests:  (-) Tinel's  (-) Finkelstein  (-) Phalen  (-) May click test  (-) ulnar impaction  (+) Chair liftoff test  (-) Fovea sign  (-) TFCC grind test  Neuro  - no sensory or motor deficit, grossly normal coordination, normal muscle tone  Musculoskeletal - left hip  - stance: normal gait without limp, no obvious leg length discrepancy   - inspection: no swelling or effusion, normal bone and joint alignment, no obvious deformity  - palpation: no lateral or anterior hip tenderness  - ROM: pain with flexion and internal rotation, normal extension, external rotation  - strength: 5/5 in all planes  - special tests:  (-) FABIEN  (+) FADIR  no pain with axial femoral load  Neuro  - no sensory or motor deficit, grossly normal coordination, normal muscle tone    IMAGING :   MR 10/2/24 left hip  Impression:     1. Focal at least moderate grade tear in the gluteus medius  trochanteric attachment.     2. Ischiofemoral space distance is in range of the lower normal limit;  although no substantial atrophy or edema of the quadratus femoris  muscle.     3. No substantial left hip degenerative change. No evidence of labral  tear on this nonarthrographic study.     I have personally reviewed the examination and initial interpretation  and I agree with the findings.     ALEA (Onur SY MD        ASSESSMENT & PLAN  Ms. Robertson is a 35 year old year old female who presents to clinic today for follow up regarding chronic right wrist pain and left hip pain.    Persistent left hip pain despite US guided diagnostic/therapeutic hip IA injection, extensive PT, chiro, dry needling, shockwave therapy, activity modification and relative rest. Pain continues to reside anteriorly about hip.  MR with gluteus medius tear and no IA pathology.    Concern right wrist pain consistent with dorsal wrist impingement and/or FCR tendon injury. Less likely early DJD.    Diagnosis:   Chronic pain of left hip  Gluteus  medius tear of left hip  Chronic pain right wrist    -Referral to a sports hip specialist to review case and provide recommendations  -Consideration for PRP gluteus tear discussed however no evidence to suggest source of ongoing pain  -Right wrist MRI w/o con and bracing options reviewed  -Follow up to discuss wrist MRI after completed    It was a pleasure seeing Miracle today.    Brian Ivan DO, RACHEL    Primary Care Sports Medicine  Department of Orthopedic Surgery  [facility]

## 2025-06-18 NOTE — LETTER
6/18/2025      Miracle Robertson  [address]  [facility] [address]      Dear Colleague,    Thank you for referring your patient, Miracle Robertson, to the [facility] [place]. Please see a copy of my visit note below.    CHIEF COMPLAINT:  No chief complaint on file.       HISTORY OF PRESENT ILLNESS  Ms. Robertson is a pleasant 35 year old year old female who presents to clinic today with right wrist and left hip pain.  Miracle explains that she has right wrist pain since November. She is unable to put pressure/weightbearing on her wrist and references numbness/discomfort when her wrist is placed in extension. The left hip started while she was snowboarding, running and squatting which she attributes her pain to overuse.     RIGHT WRIST  Onset: gradual  Location: right wrist  Quality:  stabbing, sharp, and numb  Duration: 8 months   Severity: 6/10 at worst  Timing:intermittent episodes with pressure/weightbearing  Modifying factors:  resting and non-use makes it better, movement and use makes it worse  Associated signs & symptoms: pain and tenderness  Previous similar pain: No  Treatments to date: Comfort Cool bracing, activity modifications, NSAIDs.    LEFT HIP  Onset: gradual  Location: left hip, anteriorly.  Quality:  aching and dull  Duration: 2 years, 3 months  Severity: 8/10 at worst  Timing: intermittent episodes with sitting for long periods, lateral lunges  Modifying factors:  resting and non-use makes it better, movement and use makes it worse  Associated signs & symptoms: pain and tenderness  Previous similar pain: No  Treatments to date: MRI, Physical Therapy extensively since fall 2024 11 visits and now enrolled in virtual PT, Shockwave, Cortisone Hip IA - 11/2024 ( No relief ),     Additional history: as documented    Review of Systems:   A 10-point review of systems was obtained and is negative except for as noted in the HPI.     MEDICAL HISTORY  Patient Active Problem List    Diagnosis     ODETTE (generalized anxiety disorder)     MDD (major depressive disorder)     Recurrent iritis, left     HLA B27 (HLA B27 positive)     Migraine without aura       Current Outpatient Medications   Medication Sig Dispense Refill     desogestrel-ethinyl estradiol (ISIBLOOM) 0.15-30 MG-MCG tablet Take 1 tablet by mouth daily. 84 tablet 3     fluticasone (FLONASE) 50 MCG/ACT nasal spray Spray 2 sprays into both nostrils daily. SHAKE LIQUID AND USE 16 g 3     MAGNESIUM PO Take by mouth.       prednisoLONE acetate (PRED FORTE) 1 % ophthalmic suspension Left eye: 1 drop 3x/day for 3 days. Call/message if not better. 10 mL 3     SUMAtriptan (IMITREX) 5 MG/ACT nasal spray Spray 1 spray in nostril as needed for migraine. May repeat in 30-60 minutes if continued symptoms. Max 8 sprays/24 hours. 1 each 3     venlafaxine (EFFEXOR XR) 75 MG 24 hr capsule Take 1 capsule (75 mg) by mouth every morning. 90 capsule 3       No Known Allergies    Family History   Problem Relation Age of Onset     Deep Vein Thrombosis (DVT) Mother      Breast Cancer Maternal Grandmother      Diabetes Type 2  Paternal Grandfather      Prostate Cancer Paternal Grandfather      Deep Vein Thrombosis (DVT) Brother      Cerebrovascular Disease No family hx of      Myocardial Infarction No family hx of      Coronary Artery Disease Early Onset No family hx of      Colon Cancer No family hx of      Ovarian Cancer No family hx of      Glaucoma No family hx of        Additional medical/Social/Surgical histories reviewed in [facility] and updated as appropriate.       PHYSICAL EXAM  There were no vitals taken for this visit.    General- AOX4, NAD  Musculoskeletal -  Right wrist  Inspection: normal joint alignment, no obvious deformity, no swelling  Palpation: No bony or soft tissue tenderness over dorsal wrist compartments.  Tenderness at radiocarpal region. Tenderness adjacent FCR. No tenderness at ECU or ECR.  No tenderness at the anatomical snuffbox,  radial or ulnar styloid.   ROM:  Approximately 90  of wrist flexion, 70  wrist extension, 30 ulnar deviation and 15  radial deviation without pain.  Normal supination and pronation.   Strength: 5/5  strength, 5/5 flexion, extension, pronation, supination, adduction, abduction  Special tests:  (-) Tinel's  (-) Finkelstein  (-) Phalen  (-) May click test  (-) ulnar impaction  (+) Chair liftoff test  (-) Fovea sign  (-) TFCC grind test  Neuro  - no sensory or motor deficit, grossly normal coordination, normal muscle tone  Musculoskeletal - left hip  - stance: normal gait without limp, no obvious leg length discrepancy   - inspection: no swelling or effusion, normal bone and joint alignment, no obvious deformity  - palpation: no lateral or anterior hip tenderness  - ROM: pain with flexion and internal rotation, normal extension, external rotation  - strength: 5/5 in all planes  - special tests:  (-) FABIEN  (+) FADIR  no pain with axial femoral load  Neuro  - no sensory or motor deficit, grossly normal coordination, normal muscle tone    IMAGING :   MR 10/2/24 left hip  Impression:     1. Focal at least moderate grade tear in the gluteus medius  trochanteric attachment.     2. Ischiofemoral space distance is in range of the lower normal limit;  although no substantial atrophy or edema of the quadratus femoris  muscle.     3. No substantial left hip degenerative change. No evidence of labral  tear on this nonarthrographic study.     I have personally reviewed the examination and initial interpretation  and I agree with the findings.     ALEA SY MD (Joe)        ASSESSMENT & PLAN  Ms. Robertson is a 35 year old year old female who presents to clinic today for follow up regarding chronic right wrist pain and left hip pain.    Persistent left hip pain despite US guided diagnostic/therapeutic hip IA injection, extensive PT, chiro, dry needling, shockwave therapy, activity modification and relative rest. Pain  continues to reside anteriorly about hip.  MR with gluteus medius tear and no IA pathology.    Concern right wrist pain consistent with dorsal wrist impingement and/or FCR tendon injury. Less likely early DJD.    Diagnosis:   Chronic pain of left hip  Gluteus medius tear of left hip  Chronic pain right wrist    -Referral to a sports hip specialist to review case and provide recommendations  -Consideration for PRP gluteus tear discussed however no evidence to suggest source of ongoing pain  -Right wrist MRI w/o con and bracing options reviewed  -Follow up to discuss wrist MRI after completed    It was a pleasure seeing Miracle today.    Brian Ivan DO, CAQSM    Primary Care Sports Medicine  Department of Orthopedic Surgery  [facility]    Again, thank you for allowing me to participate in the care of your patient.        Sincerely,        Brian Ivan DO    Electronically signed

## 2025-06-19 ENCOUNTER — PATIENT OUTREACH (OUTPATIENT)
Dept: CARE COORDINATION | Facility: CLINIC | Age: 35
End: 2025-06-19
Payer: COMMERCIAL

## 2025-06-23 ENCOUNTER — TELEPHONE (OUTPATIENT)
Dept: ORTHOPEDICS | Facility: CLINIC | Age: 35
End: 2025-06-23
Payer: COMMERCIAL

## 2025-06-23 ENCOUNTER — PATIENT OUTREACH (OUTPATIENT)
Dept: CARE COORDINATION | Facility: CLINIC | Age: 35
End: 2025-06-23
Payer: COMMERCIAL

## 2025-06-23 NOTE — TELEPHONE ENCOUNTER
Other: Question about providers being referred to for Hip.     Dr Ivan recently referred Miracle to surgical sub specialist for Left Anterior Hip. On that referral he listed  Dr. Dio Swann and Dr Josiane Ornelas.     These providers are not anyone Ortho Scheduling can schedule with. They appear to be working with [facility].   Miracle does not believe her insurance will allow her to be seen outside of [facility], but will check. In the meantime, are there any other options or next steps that Dr Ivan can recommend?  Miracle can be reached at  number listed        Could we send this information to you in [employer] or would you prefer to receive a phone call?:   Patient would prefer a phone call   Okay to leave a detailed message?: Yes at Home number on file [phone] (home)

## 2025-06-24 NOTE — TELEPHONE ENCOUNTER
After discussing with Dr. Ivan, I attempted to return the patient's phone call to discus referral to hip surgeon. No answer, left message stating she can also try making an appointment with Dr. Jesus Jean at the [facility] [place] [place] location if she wishes. Leticia Grayson ATC on 6/24/2025 at 11:02 AM

## 2025-07-02 ENCOUNTER — HOSPITAL ENCOUNTER (OUTPATIENT)
Dept: MRI IMAGING | Facility: CLINIC | Age: 35
Discharge: HOME | End: 2025-07-02
Attending: FAMILY MEDICINE
Payer: COMMERCIAL

## 2025-07-02 DIAGNOSIS — G89.29 CHRONIC PAIN OF RIGHT WRIST: ICD-10-CM

## 2025-07-02 DIAGNOSIS — M25.531 CHRONIC PAIN OF RIGHT WRIST: ICD-10-CM

## 2025-07-02 PROCEDURE — 73221 MRI JOINT UPR EXTREM W/O DYE: CPT | Mod: RT

## 2025-07-28 NOTE — TELEPHONE ENCOUNTER
DIAGNOSIS:   M25.552, G89.29 (ICD-10-CM) - Chronic hip pain, left      APPOINTMENT DATE: 9/11/2025   NOTES STATUS DETAILS   OFFICE NOTE from referring provider Internal Brian Ivan DO (SPTS)  6/18/2025, 11/5/2024 + more   OFFICE NOTE from other specialist Internal Latoya Hancock, PT  2/12/2025 (L Hip)  BRIT Schneider (SPTS)  11/14/2024 (L Foot)  Peg Downey MD (Rheum)  5/7/2024 (L LBP)  Kimberley Buckner, PT  7/6/2023 (LBP/Hips)  Brian Ivan DO (SPTS)  4/14/2023 (Lumbar/Hip/Groin)  Margo Mane MD (IM)  4/11/2023   MEDICATION LIST Internal    LABS     INJECTIONS DONE IN RADIOLOGY PACS L Hip: 11/5/2024   MRI PACS L Hip: 8/7/2025, 10/2/2024  Lumbar: 12/12/2024  SI Joint: 7/12/2024   XRAYS (IMAGES & REPORTS) PACS L Hip: 8/7/2025  L Foot: 11/14/2024  Chest: 2/2/2024, 12/20/2019, 5/20/2017  L Pelvis/Hip: 4/14/2023  Lumbar: 4/14/2024

## 2025-08-04 ENCOUNTER — TELEPHONE (OUTPATIENT)
Dept: MEDSURG UNIT | Facility: CLINIC | Age: 35
End: 2025-08-04
Payer: COMMERCIAL

## 2025-08-07 ENCOUNTER — MYC MEDICAL ADVICE (OUTPATIENT)
Dept: OPHTHALMOLOGY | Facility: CLINIC | Age: 35
End: 2025-08-07
Payer: COMMERCIAL

## 2025-08-07 ENCOUNTER — HOSPITAL ENCOUNTER (OUTPATIENT)
Facility: CLINIC | Age: 35
Discharge: HOME | End: 2025-08-08
Admitting: STUDENT IN AN ORGANIZED HEALTH CARE EDUCATION/TRAINING PROGRAM
Payer: COMMERCIAL

## 2025-08-07 ASSESSMENT — ACTIVITIES OF DAILY LIVING (ADL)
ADLS_ACUITY_SCORE: 41

## 2025-08-08 ASSESSMENT — ACTIVITIES OF DAILY LIVING (ADL)
ADLS_ACUITY_SCORE: 41

## 2025-09-11 ENCOUNTER — PRE VISIT (OUTPATIENT)
Dept: ORTHOPEDICS | Facility: CLINIC | Age: 35
End: 2025-09-11

## (undated) RX ORDER — TRIAMCINOLONE ACETONIDE 40 MG/ML
INJECTION, SUSPENSION INTRA-ARTICULAR; INTRAMUSCULAR
Status: DISPENSED
Start: 2024-11-05

## (undated) RX ORDER — BUPIVACAINE HYDROCHLORIDE 2.5 MG/ML
INJECTION, SOLUTION EPIDURAL; INFILTRATION; INTRACAUDAL
Status: DISPENSED
Start: 2024-11-05

## (undated) RX ORDER — BUPIVACAINE HYDROCHLORIDE 5 MG/ML
INJECTION, SOLUTION EPIDURAL; INTRACAUDAL
Status: DISPENSED
Start: 2024-11-05

## (undated) RX ORDER — LIDOCAINE HYDROCHLORIDE 10 MG/ML
INJECTION, SOLUTION EPIDURAL; INFILTRATION; INTRACAUDAL; PERINEURAL
Status: DISPENSED
Start: 2024-11-05